# Patient Record
Sex: MALE | Race: WHITE | NOT HISPANIC OR LATINO | Employment: OTHER | URBAN - NONMETROPOLITAN AREA
[De-identification: names, ages, dates, MRNs, and addresses within clinical notes are randomized per-mention and may not be internally consistent; named-entity substitution may affect disease eponyms.]

---

## 2017-07-10 ENCOUNTER — APPOINTMENT (OUTPATIENT)
Dept: CT IMAGING | Facility: HOSPITAL | Age: 77
End: 2017-07-10

## 2017-07-10 ENCOUNTER — HOSPITAL ENCOUNTER (EMERGENCY)
Facility: HOSPITAL | Age: 77
Discharge: HOME | End: 2017-07-11
Attending: EMERGENCY MEDICINE | Admitting: EMERGENCY MEDICINE

## 2017-07-10 DIAGNOSIS — S20.20XA MULTIPLE CONTUSIONS OF TRUNK, INITIAL ENCOUNTER: ICD-10-CM

## 2017-07-10 DIAGNOSIS — T07.XXXA ABRASIONS OF MULTIPLE SITES: ICD-10-CM

## 2017-07-10 DIAGNOSIS — S30.1XXA ABDOMINAL WALL HEMATOMA, INITIAL ENCOUNTER: Primary | ICD-10-CM

## 2017-07-10 LAB
ALBUMIN SERPL-MCNC: 4.3 G/DL (ref 3.4–4.8)
ALBUMIN/GLOB SERPL: 1.3 G/DL (ref 1.5–2.5)
ALP SERPL-CCNC: 93 U/L (ref 40–129)
ALT SERPL W P-5'-P-CCNC: 23 U/L (ref 10–44)
ANION GAP SERPL CALCULATED.3IONS-SCNC: 1.9 MMOL/L (ref 3.6–11.2)
APTT PPP: 29.3 SECONDS (ref 23.8–36.1)
AST SERPL-CCNC: 21 U/L (ref 10–34)
BASOPHILS # BLD AUTO: 0.03 10*3/MM3 (ref 0–0.3)
BASOPHILS NFR BLD AUTO: 0.2 % (ref 0–2)
BILIRUB SERPL-MCNC: 0.3 MG/DL (ref 0.2–1.8)
BILIRUB UR QL STRIP: NEGATIVE
BUN BLD-MCNC: 13 MG/DL (ref 7–21)
BUN/CREAT SERPL: 11.9 (ref 7–25)
CALCIUM SPEC-SCNC: 9.4 MG/DL (ref 7.7–10)
CHLORIDE SERPL-SCNC: 108 MMOL/L (ref 99–112)
CLARITY UR: CLEAR
CO2 SERPL-SCNC: 31.1 MMOL/L (ref 24.3–31.9)
COLOR UR: YELLOW
CREAT BLD-MCNC: 1.09 MG/DL (ref 0.43–1.29)
DEPRECATED RDW RBC AUTO: 45.8 FL (ref 37–54)
EOSINOPHIL # BLD AUTO: 0.22 10*3/MM3 (ref 0–0.7)
EOSINOPHIL NFR BLD AUTO: 1.4 % (ref 0–7)
ERYTHROCYTE [DISTWIDTH] IN BLOOD BY AUTOMATED COUNT: 14.2 % (ref 11.5–14.5)
GFR SERPL CREATININE-BSD FRML MDRD: 65 ML/MIN/1.73
GLOBULIN UR ELPH-MCNC: 3.3 GM/DL
GLUCOSE BLD-MCNC: 152 MG/DL (ref 70–110)
GLUCOSE UR STRIP-MCNC: NEGATIVE MG/DL
HCT VFR BLD AUTO: 45.7 % (ref 42–52)
HGB BLD-MCNC: 15.7 G/DL (ref 14–18)
HGB UR QL STRIP.AUTO: NEGATIVE
IMM GRANULOCYTES # BLD: 0.04 10*3/MM3 (ref 0–0.03)
IMM GRANULOCYTES NFR BLD: 0.3 % (ref 0–0.5)
INR PPP: 0.98 (ref 0.9–1.1)
KETONES UR QL STRIP: NEGATIVE
LEUKOCYTE ESTERASE UR QL STRIP.AUTO: NEGATIVE
LYMPHOCYTES # BLD AUTO: 2.41 10*3/MM3 (ref 1–3)
LYMPHOCYTES NFR BLD AUTO: 15.6 % (ref 16–46)
MCH RBC QN AUTO: 31.2 PG (ref 27–33)
MCHC RBC AUTO-ENTMCNC: 34.4 G/DL (ref 33–37)
MCV RBC AUTO: 90.7 FL (ref 80–94)
MONOCYTES # BLD AUTO: 0.86 10*3/MM3 (ref 0.1–0.9)
MONOCYTES NFR BLD AUTO: 5.6 % (ref 0–12)
NEUTROPHILS # BLD AUTO: 11.92 10*3/MM3 (ref 1.4–6.5)
NEUTROPHILS NFR BLD AUTO: 76.9 % (ref 40–75)
NITRITE UR QL STRIP: NEGATIVE
OSMOLALITY SERPL CALC.SUM OF ELEC: 284.3 MOSM/KG (ref 273–305)
PH UR STRIP.AUTO: 7 [PH] (ref 5–8)
PLATELET # BLD AUTO: 239 10*3/MM3 (ref 130–400)
PMV BLD AUTO: 10.4 FL (ref 6–10)
POTASSIUM BLD-SCNC: 3.7 MMOL/L (ref 3.5–5.3)
PROT SERPL-MCNC: 7.6 G/DL (ref 6–8)
PROT UR QL STRIP: NEGATIVE
PROTHROMBIN TIME: 13.1 SECONDS (ref 11–15.4)
RBC # BLD AUTO: 5.04 10*6/MM3 (ref 4.7–6.1)
SODIUM BLD-SCNC: 141 MMOL/L (ref 135–153)
SP GR UR STRIP: 1.01 (ref 1–1.03)
UROBILINOGEN UR QL STRIP: NORMAL
WBC NRBC COR # BLD: 15.48 10*3/MM3 (ref 4.5–12.5)

## 2017-07-10 PROCEDURE — 99283 EMERGENCY DEPT VISIT LOW MDM: CPT

## 2017-07-10 PROCEDURE — 71275 CT ANGIOGRAPHY CHEST: CPT | Performed by: RADIOLOGY

## 2017-07-10 PROCEDURE — 72131 CT LUMBAR SPINE W/O DYE: CPT | Performed by: RADIOLOGY

## 2017-07-10 PROCEDURE — 85025 COMPLETE CBC W/AUTO DIFF WBC: CPT | Performed by: EMERGENCY MEDICINE

## 2017-07-10 PROCEDURE — 81003 URINALYSIS AUTO W/O SCOPE: CPT | Performed by: EMERGENCY MEDICINE

## 2017-07-10 PROCEDURE — 85730 THROMBOPLASTIN TIME PARTIAL: CPT | Performed by: EMERGENCY MEDICINE

## 2017-07-10 PROCEDURE — 85610 PROTHROMBIN TIME: CPT | Performed by: EMERGENCY MEDICINE

## 2017-07-10 PROCEDURE — 74177 CT ABD & PELVIS W/CONTRAST: CPT

## 2017-07-10 PROCEDURE — 72131 CT LUMBAR SPINE W/O DYE: CPT

## 2017-07-10 PROCEDURE — 71275 CT ANGIOGRAPHY CHEST: CPT

## 2017-07-10 PROCEDURE — 0 IOPAMIDOL PER 1 ML: Performed by: EMERGENCY MEDICINE

## 2017-07-10 PROCEDURE — 74177 CT ABD & PELVIS W/CONTRAST: CPT | Performed by: RADIOLOGY

## 2017-07-10 PROCEDURE — 80053 COMPREHEN METABOLIC PANEL: CPT | Performed by: EMERGENCY MEDICINE

## 2017-07-10 RX ORDER — ETODOLAC 600 MG/1
600 TABLET, EXTENDED RELEASE ORAL DAILY
Qty: 14 TABLET | Refills: 0 | Status: SHIPPED | OUTPATIENT
Start: 2017-07-10 | End: 2018-08-07

## 2017-07-10 RX ORDER — LEVOTHYROXINE SODIUM 0.12 MG/1
100 TABLET ORAL DAILY
COMMUNITY
End: 2019-12-11 | Stop reason: DRUGHIGH

## 2017-07-10 RX ORDER — ASPIRIN 81 MG/1
81 TABLET ORAL DAILY
Status: ON HOLD | COMMUNITY
End: 2021-08-25

## 2017-07-10 RX ORDER — RANITIDINE 150 MG/1
300 TABLET ORAL DAILY
COMMUNITY
End: 2018-08-07

## 2017-07-10 RX ORDER — BUDESONIDE AND FORMOTEROL FUMARATE DIHYDRATE 160; 4.5 UG/1; UG/1
2 AEROSOL RESPIRATORY (INHALATION)
COMMUNITY

## 2017-07-10 RX ORDER — SODIUM CHLORIDE 0.9 % (FLUSH) 0.9 %
10 SYRINGE (ML) INJECTION AS NEEDED
Status: DISCONTINUED | OUTPATIENT
Start: 2017-07-10 | End: 2017-07-11 | Stop reason: HOSPADM

## 2017-07-10 RX ORDER — VALSARTAN AND HYDROCHLOROTHIAZIDE 160; 12.5 MG/1; MG/1
1 TABLET, FILM COATED ORAL DAILY
COMMUNITY
End: 2018-08-07

## 2017-07-10 RX ORDER — TIZANIDINE HYDROCHLORIDE 4 MG/1
4 CAPSULE, GELATIN COATED ORAL 3 TIMES DAILY PRN
Qty: 15 CAPSULE | Refills: 0 | Status: SHIPPED | OUTPATIENT
Start: 2017-07-10 | End: 2018-08-07

## 2017-07-10 RX ORDER — SODIUM CHLORIDE 9 MG/ML
125 INJECTION, SOLUTION INTRAVENOUS CONTINUOUS
Status: DISCONTINUED | OUTPATIENT
Start: 2017-07-10 | End: 2017-07-11 | Stop reason: HOSPADM

## 2017-07-10 RX ORDER — FEXOFENADINE HCL 180 MG/1
180 TABLET ORAL DAILY PRN
Status: ON HOLD | COMMUNITY
End: 2018-08-07

## 2017-07-10 RX ORDER — AMLODIPINE BESYLATE 5 MG/1
10 TABLET ORAL DAILY
Status: ON HOLD | COMMUNITY
End: 2018-08-07

## 2017-07-10 RX ORDER — IBUPROFEN 200 MG
TABLET ORAL 3 TIMES DAILY
Qty: 28 G | Refills: 0 | Status: SHIPPED | OUTPATIENT
Start: 2017-07-10 | End: 2018-08-07

## 2017-07-10 RX ADMIN — IOPAMIDOL 100 ML: 755 INJECTION, SOLUTION INTRAVENOUS at 23:05

## 2017-07-10 RX ADMIN — SODIUM CHLORIDE 125 ML/HR: 9 INJECTION, SOLUTION INTRAVENOUS at 22:03

## 2017-07-11 VITALS
DIASTOLIC BLOOD PRESSURE: 95 MMHG | SYSTOLIC BLOOD PRESSURE: 170 MMHG | HEIGHT: 69 IN | TEMPERATURE: 98 F | WEIGHT: 225 LBS | RESPIRATION RATE: 18 BRPM | HEART RATE: 65 BPM | BODY MASS INDEX: 33.33 KG/M2 | OXYGEN SATURATION: 96 %
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
## 2017-12-04 ENCOUNTER — APPOINTMENT (OUTPATIENT)
Dept: GENERAL RADIOLOGY | Facility: HOSPITAL | Age: 77
End: 2017-12-04

## 2017-12-04 ENCOUNTER — OFFICE VISIT (OUTPATIENT)
Dept: CARDIOLOGY | Facility: HOSPITAL | Age: 77
End: 2017-12-04

## 2017-12-04 ENCOUNTER — HOSPITAL ENCOUNTER (OUTPATIENT)
Dept: CARDIOLOGY | Facility: HOSPITAL | Age: 77
Discharge: HOME | End: 2017-12-04
Admitting: NURSE PRACTITIONER

## 2017-12-04 ENCOUNTER — HOSPITAL ENCOUNTER (EMERGENCY)
Facility: HOSPITAL | Age: 77
Discharge: HOME | End: 2017-12-04
Attending: EMERGENCY MEDICINE | Admitting: EMERGENCY MEDICINE

## 2017-12-04 VITALS
HEIGHT: 69 IN | RESPIRATION RATE: 20 BRPM | HEART RATE: 73 BPM | OXYGEN SATURATION: 95 % | SYSTOLIC BLOOD PRESSURE: 146 MMHG | BODY MASS INDEX: 33.41 KG/M2 | WEIGHT: 225.6 LBS | TEMPERATURE: 98.8 F | DIASTOLIC BLOOD PRESSURE: 74 MMHG

## 2017-12-04 VITALS
RESPIRATION RATE: 16 BRPM | SYSTOLIC BLOOD PRESSURE: 125 MMHG | DIASTOLIC BLOOD PRESSURE: 72 MMHG | OXYGEN SATURATION: 96 % | TEMPERATURE: 98.7 F | BODY MASS INDEX: 33.92 KG/M2 | HEART RATE: 61 BPM | HEIGHT: 69 IN | WEIGHT: 229 LBS

## 2017-12-04 DIAGNOSIS — I10 HYPERTENSION, UNSPECIFIED TYPE: ICD-10-CM

## 2017-12-04 DIAGNOSIS — R00.2 PALPITATION: ICD-10-CM

## 2017-12-04 DIAGNOSIS — R00.2 PALPITATION: Primary | ICD-10-CM

## 2017-12-04 DIAGNOSIS — R00.2 INTERMITTENT PALPITATIONS: Primary | ICD-10-CM

## 2017-12-04 LAB
ALBUMIN SERPL-MCNC: 4.3 G/DL (ref 3.2–4.8)
ALBUMIN/GLOB SERPL: 1.4 G/DL (ref 1.5–2.5)
ALP SERPL-CCNC: 84 U/L (ref 25–100)
ALT SERPL W P-5'-P-CCNC: 26 U/L (ref 7–40)
ANION GAP SERPL CALCULATED.3IONS-SCNC: 7 MMOL/L (ref 3–11)
AST SERPL-CCNC: 20 U/L (ref 0–33)
BASOPHILS # BLD AUTO: 0.03 10*3/MM3 (ref 0–0.2)
BASOPHILS NFR BLD AUTO: 0.2 % (ref 0–1)
BILIRUB SERPL-MCNC: 0.4 MG/DL (ref 0.3–1.2)
BILIRUB UR QL STRIP: NEGATIVE
BUN BLD-MCNC: 16 MG/DL (ref 9–23)
BUN/CREAT SERPL: 13.3 (ref 7–25)
CALCIUM SPEC-SCNC: 9.2 MG/DL (ref 8.7–10.4)
CHLORIDE SERPL-SCNC: 108 MMOL/L (ref 99–109)
CLARITY UR: CLEAR
CO2 SERPL-SCNC: 25 MMOL/L (ref 20–31)
COLOR UR: YELLOW
CREAT BLD-MCNC: 1.2 MG/DL (ref 0.6–1.3)
DEPRECATED RDW RBC AUTO: 47.3 FL (ref 37–54)
EOSINOPHIL # BLD AUTO: 0.14 10*3/MM3 (ref 0–0.3)
EOSINOPHIL NFR BLD AUTO: 1.1 % (ref 0–3)
ERYTHROCYTE [DISTWIDTH] IN BLOOD BY AUTOMATED COUNT: 14.1 % (ref 11.3–14.5)
GFR SERPL CREATININE-BSD FRML MDRD: 58 ML/MIN/1.73
GLOBULIN UR ELPH-MCNC: 3 GM/DL
GLUCOSE BLD-MCNC: 105 MG/DL (ref 70–100)
GLUCOSE UR STRIP-MCNC: NEGATIVE MG/DL
HCT VFR BLD AUTO: 45.8 % (ref 38.9–50.9)
HGB BLD-MCNC: 15.6 G/DL (ref 13.1–17.5)
HGB UR QL STRIP.AUTO: NEGATIVE
HOLD SPECIMEN: NORMAL
HOLD SPECIMEN: NORMAL
IMM GRANULOCYTES # BLD: 0.04 10*3/MM3 (ref 0–0.03)
IMM GRANULOCYTES NFR BLD: 0.3 % (ref 0–0.6)
INR PPP: 0.99
KETONES UR QL STRIP: NEGATIVE
LEUKOCYTE ESTERASE UR QL STRIP.AUTO: NEGATIVE
LYMPHOCYTES # BLD AUTO: 2.44 10*3/MM3 (ref 0.6–4.8)
LYMPHOCYTES NFR BLD AUTO: 19.5 % (ref 24–44)
MAGNESIUM SERPL-MCNC: 2.4 MG/DL (ref 1.3–2.7)
MCH RBC QN AUTO: 31.3 PG (ref 27–31)
MCHC RBC AUTO-ENTMCNC: 34.1 G/DL (ref 32–36)
MCV RBC AUTO: 91.8 FL (ref 80–99)
MONOCYTES # BLD AUTO: 0.57 10*3/MM3 (ref 0–1)
MONOCYTES NFR BLD AUTO: 4.6 % (ref 0–12)
NEUTROPHILS # BLD AUTO: 9.29 10*3/MM3 (ref 1.5–8.3)
NEUTROPHILS NFR BLD AUTO: 74.3 % (ref 41–71)
NITRITE UR QL STRIP: NEGATIVE
PH UR STRIP.AUTO: 7 [PH] (ref 5–8)
PLATELET # BLD AUTO: 219 10*3/MM3 (ref 150–450)
PMV BLD AUTO: 10.3 FL (ref 6–12)
POTASSIUM BLD-SCNC: 3.8 MMOL/L (ref 3.5–5.5)
PROT SERPL-MCNC: 7.3 G/DL (ref 5.7–8.2)
PROT UR QL STRIP: ABNORMAL
PROTHROMBIN TIME: 10.8 SECONDS (ref 9.6–11.5)
RBC # BLD AUTO: 4.99 10*6/MM3 (ref 4.2–5.76)
SODIUM BLD-SCNC: 140 MMOL/L (ref 132–146)
SP GR UR STRIP: 1.01 (ref 1–1.03)
T4 SERPL-MCNC: 7.8 MCG/DL (ref 4.7–11.4)
TROPONIN I SERPL-MCNC: <0.006 NG/ML
TSH SERPL DL<=0.05 MIU/L-ACNC: 2.51 MIU/ML (ref 0.35–5.35)
UROBILINOGEN UR QL STRIP: ABNORMAL
WBC NRBC COR # BLD: 12.51 10*3/MM3 (ref 3.5–10.8)
WHOLE BLOOD HOLD SPECIMEN: NORMAL
WHOLE BLOOD HOLD SPECIMEN: NORMAL

## 2017-12-04 PROCEDURE — 0296T HC EXT ECG > 48HR TO 21 DAY RCRD W/CONECT INTL RCRD: CPT

## 2017-12-04 PROCEDURE — 84436 ASSAY OF TOTAL THYROXINE: CPT | Performed by: EMERGENCY MEDICINE

## 2017-12-04 PROCEDURE — 83735 ASSAY OF MAGNESIUM: CPT | Performed by: EMERGENCY MEDICINE

## 2017-12-04 PROCEDURE — 84443 ASSAY THYROID STIM HORMONE: CPT | Performed by: EMERGENCY MEDICINE

## 2017-12-04 PROCEDURE — 85025 COMPLETE CBC W/AUTO DIFF WBC: CPT | Performed by: EMERGENCY MEDICINE

## 2017-12-04 PROCEDURE — 99213 OFFICE O/P EST LOW 20 MIN: CPT | Performed by: NURSE PRACTITIONER

## 2017-12-04 PROCEDURE — 99283 EMERGENCY DEPT VISIT LOW MDM: CPT

## 2017-12-04 PROCEDURE — 80053 COMPREHEN METABOLIC PANEL: CPT | Performed by: EMERGENCY MEDICINE

## 2017-12-04 PROCEDURE — 81003 URINALYSIS AUTO W/O SCOPE: CPT | Performed by: EMERGENCY MEDICINE

## 2017-12-04 PROCEDURE — 85610 PROTHROMBIN TIME: CPT | Performed by: EMERGENCY MEDICINE

## 2017-12-04 PROCEDURE — 84484 ASSAY OF TROPONIN QUANT: CPT | Performed by: EMERGENCY MEDICINE

## 2017-12-04 PROCEDURE — 71020 HC CHEST PA AND LATERAL: CPT

## 2017-12-04 PROCEDURE — 93005 ELECTROCARDIOGRAM TRACING: CPT

## 2017-12-04 RX ORDER — CHLORAL HYDRATE 500 MG
1000 CAPSULE ORAL
Status: ON HOLD | COMMUNITY
End: 2022-11-24

## 2017-12-04 RX ORDER — SODIUM CHLORIDE 0.9 % (FLUSH) 0.9 %
10 SYRINGE (ML) INJECTION AS NEEDED
Status: DISCONTINUED | OUTPATIENT
Start: 2017-12-04 | End: 2017-12-04 | Stop reason: HOSPADM

## 2017-12-04 RX ORDER — ALBUTEROL SULFATE 2.5 MG/3ML
2.5 SOLUTION RESPIRATORY (INHALATION) EVERY 4 HOURS PRN
COMMUNITY
End: 2021-09-04 | Stop reason: HOSPADM

## 2017-12-04 RX ORDER — UBIDECARENONE 30 MG
550 CAPSULE ORAL
COMMUNITY
End: 2018-08-07

## 2017-12-04 NOTE — PROGRESS NOTES
"[facility]      Encounter Date:12/04/2017     Edward Rodas  [address] [address]  [phone]    1940    LORENZO Wade    Edward Rodas is a 77 y.o. male.      Subjective:     Chief Complaint:  Palpitations (12/4/17 [facility]: Palpitations)       HPI     Patient seen [facility] emergency department earlier today for palpitations.    Patient is a 77 y.o. male presenting with palpitations.   History provided by:  Patient  Palpitations   Palpitations quality:  Irregular  Onset quality:  Sudden  Duration:  10 minutes  Timing:  Intermittent  Progression:  Waxing and waning  Chronicity:  New  Context: bronchodilators, noted at rest   Relieved by:  None tried  Exacerbated by: ventolin inhaler.  Ineffective treatments:  None tried  Associated symptoms: cough (mild non-productive)    Associated symptoms: no back pain, no chest pain, no chest pressure, no dizziness, no malaise/fatigue, no nausea, no near-syncope, no shortness of breath and no vomiting    Associated symptoms comment:  Patient reports that he attended 2 funerals in the past 2 weeks and states that smelling pollen from the flowers have triggered an asthma flare.  Also reports that his seasonal allergies have been \"acting up\".  Has been using his bronchodilator more than he typically does and has noticed that he has been experiencing intermittent palpitations for approximately 2 weeks.  Denies any chest pain, shortness of breath.    Risk factors: hx of thyroid disease    Risk factors: no diabetes mellitus, no heart disease and no hx of atrial fibrillation      Hx of HTN.  Reports well controlled at home.  Keeps a home BP log.  Currently on norvasc, diovan/HCT.      Problem   Palpitations   Hypertension       Past Surgical History:   Procedure Laterality Date   • TONSILLECTOMY         Allergies   Allergen Reactions   • Shellfish-Derived Products Hives and Itching   • Statins Other (See Comments) "     Aches and confusion         Current Outpatient Prescriptions:   •  albuterol (PROVENTIL) (2.5 MG/3ML) 0.083% nebulizer solution, Take 2.5 mg by nebulization Every 4 (Four) Hours As Needed for Wheezing., Disp: , Rfl:   •  amLODIPine (NORVASC) 5 MG tablet, Take 10 mg by mouth Daily., Disp: , Rfl:   •  aspirin 81 MG EC tablet, Take 81 mg by mouth. Close to 3 times a week, Disp: , Rfl:   •  budesonide-formoterol (SYMBICORT) 160-4.5 MCG/ACT inhaler, Inhale 2 puffs 2 (Two) Times a Day., Disp: , Rfl:   •  etodolac XL (LODINE XL) 600 MG 24 hr tablet, Take 1 tablet by mouth Daily., Disp: 14 tablet, Rfl: 0  •  fexofenadine (ALLEGRA) 180 MG tablet, Take 180 mg by mouth Daily., Disp: , Rfl:   •  levothyroxine (SYNTHROID, LEVOTHROID) 125 MCG tablet, Take 125 mcg by mouth Daily., Disp: , Rfl:   •  neomycin-bacitracin-polymyxin (NEOSPORIN) 5-400-5000 ointment, Apply  topically 3 (Three) Times a Day., Disp: 28 g, Rfl: 0  •  Omega-3 Fatty Acids (FISH OIL) 1000 MG capsule capsule, Take 3,000 mg by mouth Daily With Breakfast., Disp: , Rfl:   •  Potassium Gluconate 550 MG tablet, Take 550 mg by mouth., Disp: , Rfl:   •  raNITIdine (ZANTAC) 150 MG tablet, Take 300 mg by mouth Daily., Disp: , Rfl:   •  TiZANidine (ZANAFLEX) 4 MG capsule, Take 1 capsule by mouth 3 (Three) Times a Day As Needed for Muscle Spasms., Disp: 15 capsule, Rfl: 0  •  valsartan-hydrochlorothiazide (DIOVAN-HCT) 160-12.5 MG per tablet, Take 1 tablet by mouth Daily., Disp: , Rfl:   No current facility-administered medications for this visit.     The following portions of the patient's history were reviewed and updated as appropriate: allergies, current medications, past family history, past medical history, past social history, past surgical history and problem list.    Review of Systems   Constitution: Positive for malaise/fatigue. Negative for chills, decreased appetite, diaphoresis, fever, weakness, night sweats, weight gain and weight loss.   HENT: Negative  "for congestion and nosebleeds.    Eyes: Negative for blurred vision, visual disturbance and visual halos.   Cardiovascular: Positive for irregular heartbeat and palpitations. Negative for chest pain, claudication, cyanosis, dyspnea on exertion, leg swelling, near-syncope, orthopnea, paroxysmal nocturnal dyspnea and syncope.   Respiratory: Positive for shortness of breath (asthma). Negative for cough, hemoptysis, sleep disturbances due to breathing, snoring, sputum production and wheezing.    Endocrine: Positive for polyuria. Negative for cold intolerance, heat intolerance, polydipsia and polyphagia.   Hematologic/Lymphatic: Does not bruise/bleed easily.   Skin: Negative for dry skin, itching and rash.   Musculoskeletal: Negative for falls, joint pain, joint swelling, muscle weakness and myalgias.   Gastrointestinal: Positive for constipation. Negative for bloating, abdominal pain, diarrhea, dysphagia, heartburn, melena, nausea and vomiting.   Genitourinary: Negative for dysuria, flank pain, hematuria and nocturia.   Neurological: Negative for difficulty with concentration, excessive daytime sleepiness, dizziness, headaches and loss of balance.   Psychiatric/Behavioral: Negative for altered mental status and depression. The patient is not nervous/anxious.    Allergic/Immunologic: Positive for environmental allergies.       Objective:     Vitals:    12/04/17 1421 12/04/17 1427 12/04/17 1428   BP: 153/82 145/76 146/74   BP Location: Right arm Left arm Left arm   Patient Position: Sitting Sitting Standing   Pulse: 71 71 73   Resp: 20     Temp: 98.8 °F (37.1 °C)     TempSrc: Temporal Artery      SpO2: 95%     Weight: 225 lb 9.6 oz (102 kg)     Height: 69\" (175.3 cm)           Physical Exam   Constitutional: He is oriented to person, place, and time. He appears well-developed and well-nourished. No distress.   HENT:   Head: Normocephalic and atraumatic.   Mouth/Throat: Oropharynx is clear and moist.   Eyes: Conjunctivae " are normal. Pupils are equal, round, and reactive to light. No scleral icterus.   Neck: No hepatojugular reflux and no JVD present. Carotid bruit is not present. No tracheal deviation present. No thyromegaly present.   Cardiovascular: Normal rate, regular rhythm, normal heart sounds and intact distal pulses.  Exam reveals no friction rub.    No murmur heard.  Pulmonary/Chest: Effort normal and breath sounds normal.   Abdominal: Soft. Bowel sounds are normal. He exhibits no distension. There is no tenderness.   Musculoskeletal: He exhibits no edema.   Lymphadenopathy:     He has no cervical adenopathy.   Neurological: He is alert and oriented to person, place, and time.   Skin: Skin is warm, dry and intact. No rash noted. No cyanosis or erythema. No pallor.   Psychiatric: He has a normal mood and affect. His behavior is normal. Thought content normal.   Vitals reviewed.      Lab and Diagnostic Review:  Admission on 12/04/2017, Discharged on 12/04/2017   Component Date Value Ref Range Status   • Glucose 12/04/2017 105* 70 - 100 mg/dL Final   • BUN 12/04/2017 16  9 - 23 mg/dL Final   • Creatinine 12/04/2017 1.20  0.60 - 1.30 mg/dL Final   • Sodium 12/04/2017 140  132 - 146 mmol/L Final   • Potassium 12/04/2017 3.8  3.5 - 5.5 mmol/L Final   • Chloride 12/04/2017 108  99 - 109 mmol/L Final   • CO2 12/04/2017 25.0  20.0 - 31.0 mmol/L Final   • Calcium 12/04/2017 9.2  8.7 - 10.4 mg/dL Final   • Total Protein 12/04/2017 7.3  5.7 - 8.2 g/dL Final   • Albumin 12/04/2017 4.30  3.20 - 4.80 g/dL Final   • ALT (SGPT) 12/04/2017 26  7 - 40 U/L Final   • AST (SGOT) 12/04/2017 20  0 - 33 U/L Final   • Alkaline Phosphatase 12/04/2017 84  25 - 100 U/L Final   • Total Bilirubin 12/04/2017 0.4  0.3 - 1.2 mg/dL Final   • eGFR Non African Amer 12/04/2017 58* >60 mL/min/1.73 Final   • Globulin 12/04/2017 3.0  gm/dL Final   • A/G Ratio 12/04/2017 1.4* 1.5 - 2.5 g/dL Final   • BUN/Creatinine Ratio 12/04/2017 13.3  7.0 - 25.0 Final   • Anion  Gap 12/04/2017 7.0  3.0 - 11.0 mmol/L Final   • Magnesium 12/04/2017 2.4  1.3 - 2.7 mg/dL Final   • Protime 12/04/2017 10.8  9.6 - 11.5 Seconds Final   • INR 12/04/2017 0.99   Final   • TSH 12/04/2017 2.509  0.350 - 5.350 mIU/mL Final   • T4, Total 12/04/2017 7.80  4.70 - 11.40 mcg/dL Final   • Extra Tube 12/04/2017 hold for add-on   Final    Auto resulted   • Extra Tube 12/04/2017 Hold for add-ons.   Final    Auto resulted.   • Extra Tube 12/04/2017 hold for add-on   Final    Auto resulted   • Extra Tube 12/04/2017 Hold for add-ons.   Final    Auto resulted.   • WBC 12/04/2017 12.51* 3.50 - 10.80 10*3/mm3 Final   • RBC 12/04/2017 4.99  4.20 - 5.76 10*6/mm3 Final   • Hemoglobin 12/04/2017 15.6  13.1 - 17.5 g/dL Final   • Hematocrit 12/04/2017 45.8  38.9 - 50.9 % Final   • MCV 12/04/2017 91.8  80.0 - 99.0 fL Final   • MCH 12/04/2017 31.3* 27.0 - 31.0 pg Final   • MCHC 12/04/2017 34.1  32.0 - 36.0 g/dL Final   • RDW 12/04/2017 14.1  11.3 - 14.5 % Final   • RDW-SD 12/04/2017 47.3  37.0 - 54.0 fl Final   • MPV 12/04/2017 10.3  6.0 - 12.0 fL Final   • Platelets 12/04/2017 219  150 - 450 10*3/mm3 Final   • Neutrophil % 12/04/2017 74.3* 41.0 - 71.0 % Final   • Lymphocyte % 12/04/2017 19.5* 24.0 - 44.0 % Final   • Monocyte % 12/04/2017 4.6  0.0 - 12.0 % Final   • Eosinophil % 12/04/2017 1.1  0.0 - 3.0 % Final   • Basophil % 12/04/2017 0.2  0.0 - 1.0 % Final   • Immature Grans % 12/04/2017 0.3  0.0 - 0.6 % Final   • Neutrophils, Absolute 12/04/2017 9.29* 1.50 - 8.30 10*3/mm3 Final   • Lymphocytes, Absolute 12/04/2017 2.44  0.60 - 4.80 10*3/mm3 Final   • Monocytes, Absolute 12/04/2017 0.57  0.00 - 1.00 10*3/mm3 Final   • Eosinophils, Absolute 12/04/2017 0.14  0.00 - 0.30 10*3/mm3 Final   • Basophils, Absolute 12/04/2017 0.03  0.00 - 0.20 10*3/mm3 Final   • Immature Grans, Absolute 12/04/2017 0.04* 0.00 - 0.03 10*3/mm3 Final   • Color, UA 12/04/2017 Yellow  Yellow, Straw Final   • Appearance, UA 12/04/2017 Clear  Clear Final    • pH, UA 12/04/2017 7.0  5.0 - 8.0 Final   • Specific Gravity, UA 12/04/2017 1.009  1.001 - 1.030 Final   • Glucose, UA 12/04/2017 Negative  Negative Final   • Ketones, UA 12/04/2017 Negative  Negative Final   • Bilirubin, UA 12/04/2017 Negative  Negative Final   • Blood, UA 12/04/2017 Negative  Negative Final   • Protein, UA 12/04/2017 Trace* Negative Final   • Leuk Esterase, UA 12/04/2017 Negative  Negative Final   • Nitrite, UA 12/04/2017 Negative  Negative Final   • Urobilinogen, UA 12/04/2017 0.2 E.U./dL  0.2 - 1.0 E.U./dL Final   • Troponin I 12/04/2017 <0.006  <=0.039 ng/mL Final       Assessment and Plan:         1. Palpitation    - Holter Monitor - 72 Hour Up To 21 Days; Zio 14 day monitor    2. Hypertension, unspecified type  Keep bP and HR log    F/u 6 weeks or sooner if needed.      *Please note that portions of this note were completed with a voice recognition program. Efforts were made to edit the dictations, but occasionally words are mistranscribed.

## 2018-01-02 ENCOUNTER — DOCUMENTATION (OUTPATIENT)
Dept: CARDIOLOGY | Facility: HOSPITAL | Age: 78
End: 2018-01-02

## 2018-01-02 ENCOUNTER — OFFICE VISIT (OUTPATIENT)
Dept: CARDIOLOGY | Facility: CLINIC | Age: 78
End: 2018-01-02

## 2018-01-02 DIAGNOSIS — R00.2 PALPITATIONS: ICD-10-CM

## 2018-01-02 PROCEDURE — 0298T PR EXT ECG > 48HR TO 21 DAY REVIEW AND INTERPRETATN: CPT | Performed by: INTERNAL MEDICINE

## 2018-01-02 NOTE — PROGRESS NOTES
Called pt to discuss Zio monitor results (12/04/17-12/18/17):  Average heart rate 66 bpm.  Primarily sinus rhythm.  Short bursts of nonsustained SVT (asymptomatic).  One run of VT lasting 22 beats (asymptomatic).  Rare PACs and PVCs. Pt activated s/s associated with SR.    Dr. Plunkett after reading cardiac heart monitor results recommended stress testing due to VT.  Discuss results and recommendations with patient to evaluate for underlying cardiovascular disease.  Patient declined having stress test at this time.  Patient has scheduled follow-up.  Encourage patient to keep follow-up as scheduled.  Please call if symptoms worsen.  Patient instructed to go to the emergency room with any chest pain.  Patient currently denies chest pain, pressure, palpitations have improved.  Patient does have dyspnea associated with asthma.  Discussed with patient, that dyspnea could also be r/t to underlying CAD as well.  Pt  states understanding, but does not want to schedule testing at this time.

## 2018-02-07 ENCOUNTER — HOSPITAL ENCOUNTER (EMERGENCY)
Facility: HOSPITAL | Age: 78
Discharge: LEFT BEFORE DISCHARGE | End: 2018-02-07

## 2018-02-07 VITALS
BODY MASS INDEX: 33.47 KG/M2 | TEMPERATURE: 98 F | SYSTOLIC BLOOD PRESSURE: 142 MMHG | RESPIRATION RATE: 18 BRPM | OXYGEN SATURATION: 96 % | HEART RATE: 95 BPM | DIASTOLIC BLOOD PRESSURE: 84 MMHG | HEIGHT: 69 IN | WEIGHT: 226 LBS

## 2018-02-07 PROCEDURE — 99211 OFF/OP EST MAY X REQ PHY/QHP: CPT

## 2018-08-07 ENCOUNTER — APPOINTMENT (OUTPATIENT)
Dept: GENERAL RADIOLOGY | Facility: HOSPITAL | Age: 78
End: 2018-08-07

## 2018-08-07 ENCOUNTER — HOSPITAL ENCOUNTER (OUTPATIENT)
Facility: HOSPITAL | Age: 78
Setting detail: OBSERVATION
Discharge: HOME | End: 2018-08-08
Attending: EMERGENCY MEDICINE | Admitting: EMERGENCY MEDICINE

## 2018-08-07 DIAGNOSIS — R55 NEAR SYNCOPE: Primary | ICD-10-CM

## 2018-08-07 DIAGNOSIS — Z86.79 HISTORY OF VENTRICULAR TACHYCARDIA: ICD-10-CM

## 2018-08-07 DIAGNOSIS — I47.29 NSVT (NONSUSTAINED VENTRICULAR TACHYCARDIA) (HCC): ICD-10-CM

## 2018-08-07 LAB
ALBUMIN SERPL-MCNC: 4.12 G/DL (ref 3.2–4.8)
ALBUMIN/GLOB SERPL: 1.3 G/DL (ref 1.5–2.5)
ALP SERPL-CCNC: 97 U/L (ref 25–100)
ALT SERPL W P-5'-P-CCNC: 23 U/L (ref 7–40)
ANION GAP SERPL CALCULATED.3IONS-SCNC: 5 MMOL/L (ref 3–11)
AST SERPL-CCNC: 16 U/L (ref 0–33)
BASOPHILS # BLD AUTO: 0.01 10*3/MM3 (ref 0–0.2)
BASOPHILS NFR BLD AUTO: 0.1 % (ref 0–1)
BILIRUB SERPL-MCNC: 0.5 MG/DL (ref 0.3–1.2)
BILIRUB UR QL STRIP: NEGATIVE
BUN BLD-MCNC: 17 MG/DL (ref 9–23)
BUN/CREAT SERPL: 14.4 (ref 7–25)
CALCIUM SPEC-SCNC: 9.5 MG/DL (ref 8.7–10.4)
CHLORIDE SERPL-SCNC: 106 MMOL/L (ref 99–109)
CLARITY UR: CLEAR
CO2 SERPL-SCNC: 28 MMOL/L (ref 20–31)
COLOR UR: YELLOW
CREAT BLD-MCNC: 1.18 MG/DL (ref 0.6–1.3)
DEPRECATED RDW RBC AUTO: 46.7 FL (ref 37–54)
EOSINOPHIL # BLD AUTO: 0.11 10*3/MM3 (ref 0–0.3)
EOSINOPHIL NFR BLD AUTO: 0.7 % (ref 0–3)
ERYTHROCYTE [DISTWIDTH] IN BLOOD BY AUTOMATED COUNT: 14.1 % (ref 11.3–14.5)
GFR SERPL CREATININE-BSD FRML MDRD: 59 ML/MIN/1.73
GLOBULIN UR ELPH-MCNC: 3.3 GM/DL
GLUCOSE BLD-MCNC: 157 MG/DL (ref 70–100)
GLUCOSE BLDC GLUCOMTR-MCNC: 98 MG/DL (ref 70–130)
GLUCOSE UR STRIP-MCNC: NEGATIVE MG/DL
HCT VFR BLD AUTO: 45.8 % (ref 38.9–50.9)
HGB BLD-MCNC: 15.4 G/DL (ref 13.1–17.5)
HGB UR QL STRIP.AUTO: NEGATIVE
HOLD SPECIMEN: NORMAL
HOLD SPECIMEN: NORMAL
IMM GRANULOCYTES # BLD: 0.05 10*3/MM3 (ref 0–0.03)
IMM GRANULOCYTES NFR BLD: 0.3 % (ref 0–0.6)
KETONES UR QL STRIP: NEGATIVE
LEUKOCYTE ESTERASE UR QL STRIP.AUTO: NEGATIVE
LYMPHOCYTES # BLD AUTO: 2.44 10*3/MM3 (ref 0.6–4.8)
LYMPHOCYTES NFR BLD AUTO: 15.7 % (ref 24–44)
MAGNESIUM SERPL-MCNC: 2.3 MG/DL (ref 1.3–2.7)
MCH RBC QN AUTO: 30.9 PG (ref 27–31)
MCHC RBC AUTO-ENTMCNC: 33.6 G/DL (ref 32–36)
MCV RBC AUTO: 91.8 FL (ref 80–99)
MONOCYTES # BLD AUTO: 0.8 10*3/MM3 (ref 0–1)
MONOCYTES NFR BLD AUTO: 5.2 % (ref 0–12)
NEUTROPHILS # BLD AUTO: 12.14 10*3/MM3 (ref 1.5–8.3)
NEUTROPHILS NFR BLD AUTO: 78.3 % (ref 41–71)
NITRITE UR QL STRIP: NEGATIVE
PH UR STRIP.AUTO: 7 [PH] (ref 5–8)
PLATELET # BLD AUTO: 217 10*3/MM3 (ref 150–450)
PMV BLD AUTO: 10.5 FL (ref 6–12)
POTASSIUM BLD-SCNC: 3.6 MMOL/L (ref 3.5–5.5)
PROT SERPL-MCNC: 7.4 G/DL (ref 5.7–8.2)
PROT UR QL STRIP: NEGATIVE
RBC # BLD AUTO: 4.99 10*6/MM3 (ref 4.2–5.76)
SODIUM BLD-SCNC: 139 MMOL/L (ref 132–146)
SP GR UR STRIP: 1.01 (ref 1–1.03)
TROPONIN I SERPL-MCNC: 0 NG/ML (ref 0–0.07)
UROBILINOGEN UR QL STRIP: NORMAL
WBC NRBC COR # BLD: 15.5 10*3/MM3 (ref 3.5–10.8)
WHOLE BLOOD HOLD SPECIMEN: NORMAL
WHOLE BLOOD HOLD SPECIMEN: NORMAL

## 2018-08-07 PROCEDURE — 93005 ELECTROCARDIOGRAM TRACING: CPT

## 2018-08-07 PROCEDURE — 94640 AIRWAY INHALATION TREATMENT: CPT

## 2018-08-07 PROCEDURE — 85025 COMPLETE CBC W/AUTO DIFF WBC: CPT

## 2018-08-07 PROCEDURE — 93005 ELECTROCARDIOGRAM TRACING: CPT | Performed by: EMERGENCY MEDICINE

## 2018-08-07 PROCEDURE — 99219 PR INITIAL OBSERVATION CARE/DAY 50 MINUTES: CPT | Performed by: INTERNAL MEDICINE

## 2018-08-07 PROCEDURE — 80053 COMPREHEN METABOLIC PANEL: CPT | Performed by: EMERGENCY MEDICINE

## 2018-08-07 PROCEDURE — 81003 URINALYSIS AUTO W/O SCOPE: CPT | Performed by: EMERGENCY MEDICINE

## 2018-08-07 PROCEDURE — 82962 GLUCOSE BLOOD TEST: CPT

## 2018-08-07 PROCEDURE — 83735 ASSAY OF MAGNESIUM: CPT | Performed by: EMERGENCY MEDICINE

## 2018-08-07 PROCEDURE — G0378 HOSPITAL OBSERVATION PER HR: HCPCS

## 2018-08-07 PROCEDURE — 84484 ASSAY OF TROPONIN QUANT: CPT

## 2018-08-07 PROCEDURE — 71045 X-RAY EXAM CHEST 1 VIEW: CPT

## 2018-08-07 PROCEDURE — 99285 EMERGENCY DEPT VISIT HI MDM: CPT

## 2018-08-07 RX ORDER — BUDESONIDE AND FORMOTEROL FUMARATE DIHYDRATE 160; 4.5 UG/1; UG/1
2 AEROSOL RESPIRATORY (INHALATION)
Status: DISCONTINUED | OUTPATIENT
Start: 2018-08-07 | End: 2018-08-08 | Stop reason: HOSPADM

## 2018-08-07 RX ORDER — AMLODIPINE BESYLATE 10 MG/1
10 TABLET ORAL DAILY
Status: DISCONTINUED | OUTPATIENT
Start: 2018-08-07 | End: 2018-08-08 | Stop reason: HOSPADM

## 2018-08-07 RX ORDER — LEVOTHYROXINE SODIUM 0.12 MG/1
125 TABLET ORAL DAILY
Status: DISCONTINUED | OUTPATIENT
Start: 2018-08-08 | End: 2018-08-08 | Stop reason: HOSPADM

## 2018-08-07 RX ORDER — LOSARTAN POTASSIUM AND HYDROCHLOROTHIAZIDE 25; 100 MG/1; MG/1
1 TABLET ORAL DAILY
COMMUNITY
End: 2018-11-01 | Stop reason: ALTCHOICE

## 2018-08-07 RX ORDER — AMLODIPINE BESYLATE 10 MG/1
10 TABLET ORAL DAILY
Status: DISCONTINUED | OUTPATIENT
Start: 2018-08-08 | End: 2018-08-07

## 2018-08-07 RX ORDER — SODIUM CHLORIDE 0.9 % (FLUSH) 0.9 %
1-10 SYRINGE (ML) INJECTION AS NEEDED
Status: DISCONTINUED | OUTPATIENT
Start: 2018-08-07 | End: 2018-08-08 | Stop reason: HOSPADM

## 2018-08-07 RX ORDER — AMLODIPINE BESYLATE 10 MG/1
10 TABLET ORAL DAILY
COMMUNITY
End: 2019-06-18 | Stop reason: SINTOL

## 2018-08-07 RX ORDER — SODIUM CHLORIDE 0.9 % (FLUSH) 0.9 %
10 SYRINGE (ML) INJECTION AS NEEDED
Status: DISCONTINUED | OUTPATIENT
Start: 2018-08-07 | End: 2018-08-08 | Stop reason: HOSPADM

## 2018-08-07 RX ORDER — ASPIRIN 81 MG/1
81 TABLET ORAL DAILY
Status: DISCONTINUED | OUTPATIENT
Start: 2018-08-08 | End: 2018-08-08 | Stop reason: HOSPADM

## 2018-08-07 RX ADMIN — BUDESONIDE AND FORMOTEROL FUMARATE DIHYDRATE 2 PUFF: 160; 4.5 AEROSOL RESPIRATORY (INHALATION) at 20:18

## 2018-08-07 RX ADMIN — AMLODIPINE BESYLATE 10 MG: 10 TABLET ORAL at 20:48

## 2018-08-07 NOTE — CONSULTS
[facility]  CARDIOLOGY CONSULTATION NOTE    Edward Rodas  : 1940  MRN:[id number]    Date of Consultation: 18    PCP: Ángela Bonilla, LORENZO    IDENTIFICATION: An 78 y.o. male resident of [place], [place]    Chief Complaint   Patient presents with   • Near-syncope     PROBLEM LIST:   1. Near-syncope  2. Palpitations  A. ZIO monitor 2017: SR as predominant rhythm, 1 run of NSVT (12 beats), 15 runs of SVT with longest lasting 10 beats  B. Stress test recommended for NSVT, patient refused  3. Hypothyroidism  4. Hypertension    ALLERGIES:   Allergies   Allergen Reactions   • Diprivan [Propofol] Angioedema   • Shellfish-Derived Products Hives and Itching   • Statins Other (See Comments)     Aches and confusion     HPI: Mr. Rodas is a pleasant 77 y/o WM with history of HTN and hypothyroidism and no prior cardiac history who is seen in consultation for 2 episodes of near-syncope. He reports the first episode occurred yesterday afternoon while he was standing in the kitchen and he had near-syncope. He attributes this episode to low blood sugar as he reports he ate some chocolate syrup and drank some milk and felt better. He reports feeling bad all day yesterday. He had another episode today while sitting at the kitchen table. This one occurred approximately 30 minutes after eating breakfast and lasted about 3 minutes. He denies any associated symptoms such as chest discomfort, palpitations, dyspnea. He denies gabriel syncope. He was evaluated last December in our [facility] for palpitations. At that time he wore a ZIO monitor which demonstrated some SVT as well as NSVT and stress test was recommended, however patient refused. He has not had recurrent tachypalpitations since that time. He denies angina, dyspnea, or heart failure symptoms. No history of MI, CVA, DVT/PE. No alcohol, tobacco or drug use. He also reports a productive cough that began yesterday, but  "denies fever, chills. He drinks 6 cups of coffee daily, however states he drinks \"plenty of water\" as well.       ROS: All systems have been reviewed and are negative with the exception of those mentioned in the HPI and problem list above.    Surgical History:   Past Surgical History:   Procedure Laterality Date   • TONSILLECTOMY       Social History:   Social History     Social History   • Marital status:      Spouse name: N/A   • Number of children: N/A   • Years of education: N/A     Occupational History   • Not on file.     Social History Main Topics   • Smoking status: Never Smoker   • Smokeless tobacco: Never Used   • Alcohol use No   • Drug use: No   • Sexual activity: Defer     Other Topics Concern   • Not on file     Social History Narrative    Caffeine use: 3-4 cups coffee daily.    Patient lives at home with his wife.          Family History: History reviewed. No pertinent family history.    Objective     /88   Pulse 67   Temp 98.4 °F (36.9 °C) (Oral)   Resp 16   Ht 175.3 cm (69\")   Wt 103 kg (227 lb)   SpO2 97%   BMI 33.52 kg/m²   No intake or output data in the 24 hours ending 08/07/18 1749    PHYSICAL EXAM:  CONSTITUTIONAL: Well nourished, cooperative, in no acute distress  HEENT: Normocephalic, atraumatic, PERRLA, no JVD, no carotid bruit  CARDIOVASCULAR:  Regular rhythm and normal rate, no murmur, gallop, rub. Peripheral pulses are present and equal bilaterally  RESPIRATORY: Clear to auscultation, normal respiratory effort, no wheezing, rales or ronchi  GI: Soft, nontender, normal bowel sounds  MUSCULOSKELETAL: No gross deformities, no edema  SKIN: Warm, dry. No bleeding, bruising or rash  NEUROLOGICAL: No focal deficits  PSYCHIATRIC: Normal mood and affect. Behavior is normal     Labs/Diagnostic Data    Results from last 7 days  Lab Units 08/07/18  1414   SODIUM mmol/L 139   POTASSIUM mmol/L 3.6   CHLORIDE mmol/L 106   CO2 mmol/L 28.0   BUN mg/dL 17   CREATININE mg/dL 1.18 "   GLUCOSE mg/dL 157*   CALCIUM mg/dL 9.5       Results from last 7 days  Lab Units 08/07/18  1414   WBC 10*3/mm3 15.50*   HEMOGLOBIN g/dL 15.4   HEMATOCRIT % 45.8   PLATELETS 10*3/mm3 217       Results from last 7 days  Lab Units 08/07/18  1414   MAGNESIUM mg/dL 2.3     Troponin: 0.00    I personally reviewed the patient's EKG/Telemetry data    Radiology Data:   CXR 8/7/2018:  FINDINGS: The cardiac silhouette is normal. There are chronic pulmonary  changes. There is no acute pulmonary process, mass or effusion.         IMPRESSION:  Chronic change; no acute disease.    Assessment and Plan:     1. Near-syncope  - will admit for observation given history of NSVT   - obtain orthostatic BP's  - obtain echocardiogram and nuclear stress test.    2. Hypothyroidism  - check TSH in AM    3. Productive cough and leukocytosis  - CXR interpreted as chronic changes  - obtain PA and lateral  CXR in AM    I, Leonardo Burrows MD, personally performed the services described as documented by the above named individual. I have made any necessary edits and it is both accurate and complete 8/7/2018  7:19 PM          Scribed for Leonardo Burrows MD by Annemarie Miguel PA-C. 8/7/2018  5:49 PM      Thank you for allowing me to participate in the care of Edward Rodas. Feel free to contact me directly with any further questions or concerns.

## 2018-08-07 NOTE — ED PROVIDER NOTES
Subjective   Edward Rodas is a 78 y.o.male who presents to the ED with complaints of a near syncopal episodes.The patient says that yesterday at supper he went to fix a drink and he had a near syncopal episode where he about loss consciousness. He denies getting sweaty at this time. He attributed this to low blood sugar and he drank some milk and put some chocolate syrup on his tongue and he got better in a few minutes. This morning he got up and fixed breakfast and ate friend, eggs, toast, coffee, and jelly. Thirty minutes later he felt like he was going to pass out, he says he also nearly loss consciousness again and he had to sit down to recover. His episode felt similar to his episodes he attributes to low blood sugar, but he says that he is more concerned because of the frequency these episodes have happened. He has had a cough that produces yellow sputum that began yesterday. He denies having any fevers, chest pain, shortness of breath, or diaphoresis. He also denies having any difficulty urinating and he says he drinks plenty of fluids. The patient says he knows he has low blood sugar, but this has not been confirmed in any way other than doctors telling him this. The patient did have his fluid pill increased about a month and a half ago and he just changed his blood pressure medication to Valsartan and Losartan. He has no history of surgeries. He did have a stress test sometime between 2000 and 2002. He wore a holter monitor for two weeks in December of 2017. They found that he had episodes of Ventricular Tachycardia and he was supposed to follow up, but he did not. There are no other acute complaints at this time.               History provided by:  Patient  Syncope   Episode history:  Multiple  Most recent episode:  Today  Timing:  Constant  Progression:  Resolved  Chronicity:  Recurrent  Witnessed: yes    Relieved by:  Eating  Worsened by:  Nothing  Associated symptoms: no chest pain, no diaphoresis, no  fever and no shortness of breath        Review of Systems   Constitutional: Negative for diaphoresis and fever.   Respiratory: Positive for cough (producing yellow sputum). Negative for shortness of breath.    Cardiovascular: Positive for syncope. Negative for chest pain.   Neurological:        Near-syncope     All other systems reviewed and are negative.      Past Medical History:   Diagnosis Date   • Disease of thyroid gland    • Hypertension        Allergies   Allergen Reactions   • Diprivan [Propofol] Angioedema   • Shellfish-Derived Products Hives and Itching   • Statins Other (See Comments)     Aches and confusion       Past Surgical History:   Procedure Laterality Date   • TONSILLECTOMY         History reviewed. No pertinent family history.    Social History     Social History   • Marital status:      Social History Main Topics   • Smoking status: Never Smoker   • Smokeless tobacco: Never Used   • Alcohol use No   • Drug use: No   • Sexual activity: Defer     Other Topics Concern   • Not on file     Social History Narrative    Caffeine use: 3-4 cups coffee daily.    Patient lives at home with his wife.              Objective   Physical Exam   Constitutional: He is oriented to person, place, and time. He appears well-developed and well-nourished. No distress.   HENT:   Head: Normocephalic and atraumatic.   Nose: Nose normal.   Eyes: Conjunctivae are normal. No scleral icterus.   Neck: Normal range of motion. Neck supple.   Cardiovascular: Normal rate, regular rhythm and normal heart sounds.    No murmur heard.  Pulmonary/Chest: Effort normal and breath sounds normal. No respiratory distress.   Abdominal: Soft. Bowel sounds are normal. There is no tenderness.   Musculoskeletal: Normal range of motion. He exhibits no edema.   Neurological: He is alert and oriented to person, place, and time.   Skin: Skin is warm and dry.   Psychiatric: He has a normal mood and affect. His behavior is normal.   Nursing  note and vitals reviewed.      Procedures         ED Course  ED Course as of Aug 07 2303   Tue Aug 07, 2018   1452 WBC: (!) 15.50 [TJ]   1813 Discussed the plan care of the patient and his son.  To document that the Holter monitor and December did show a run of V. tach and was asymptomatic is post to follow-up but did not.  Discussed patient has a son feel more comfortable being seen by cardiology here and they will determine disposition.  [MARIO]   1813 Cardiology came to emergency department as can admit the patient overnight for observation.  [MARIO]      ED Course User Index  [MARIO] Weston Colby PA  [TJ] Herman Ozuna     Recent Results (from the past 24 hour(s))   Comprehensive Metabolic Panel    Collection Time: 08/07/18  2:14 PM   Result Value Ref Range    Glucose 157 (H) 70 - 100 mg/dL    BUN 17 9 - 23 mg/dL    Creatinine 1.18 0.60 - 1.30 mg/dL    Sodium 139 132 - 146 mmol/L    Potassium 3.6 3.5 - 5.5 mmol/L    Chloride 106 99 - 109 mmol/L    CO2 28.0 20.0 - 31.0 mmol/L    Calcium 9.5 8.7 - 10.4 mg/dL    Total Protein 7.4 5.7 - 8.2 g/dL    Albumin 4.12 3.20 - 4.80 g/dL    ALT (SGPT) 23 7 - 40 U/L    AST (SGOT) 16 0 - 33 U/L    Alkaline Phosphatase 97 25 - 100 U/L    Total Bilirubin 0.5 0.3 - 1.2 mg/dL    eGFR Non African Amer 59 (L) >60 mL/min/1.73    Globulin 3.3 gm/dL    A/G Ratio 1.3 (L) 1.5 - 2.5 g/dL    BUN/Creatinine Ratio 14.4 7.0 - 25.0    Anion Gap 5.0 3.0 - 11.0 mmol/L   Magnesium    Collection Time: 08/07/18  2:14 PM   Result Value Ref Range    Magnesium 2.3 1.3 - 2.7 mg/dL   Light Blue Top    Collection Time: 08/07/18  2:14 PM   Result Value Ref Range    Extra Tube hold for add-on    Green Top (Gel)    Collection Time: 08/07/18  2:14 PM   Result Value Ref Range    Extra Tube Hold for add-ons.    Lavender Top    Collection Time: 08/07/18  2:14 PM   Result Value Ref Range    Extra Tube hold for add-on    Gold Top - SST    Collection Time: 08/07/18  2:14 PM   Result Value Ref Range    Extra Tube  Hold for add-ons.    CBC Auto Differential    Collection Time: 08/07/18  2:14 PM   Result Value Ref Range    WBC 15.50 (H) 3.50 - 10.80 10*3/mm3    RBC 4.99 4.20 - 5.76 10*6/mm3    Hemoglobin 15.4 13.1 - 17.5 g/dL    Hematocrit 45.8 38.9 - 50.9 %    MCV 91.8 80.0 - 99.0 fL    MCH 30.9 27.0 - 31.0 pg    MCHC 33.6 32.0 - 36.0 g/dL    RDW 14.1 11.3 - 14.5 %    RDW-SD 46.7 37.0 - 54.0 fl    MPV 10.5 6.0 - 12.0 fL    Platelets 217 150 - 450 10*3/mm3    Neutrophil % 78.3 (H) 41.0 - 71.0 %    Lymphocyte % 15.7 (L) 24.0 - 44.0 %    Monocyte % 5.2 0.0 - 12.0 %    Eosinophil % 0.7 0.0 - 3.0 %    Basophil % 0.1 0.0 - 1.0 %    Immature Grans % 0.3 0.0 - 0.6 %    Neutrophils, Absolute 12.14 (H) 1.50 - 8.30 10*3/mm3    Lymphocytes, Absolute 2.44 0.60 - 4.80 10*3/mm3    Monocytes, Absolute 0.80 0.00 - 1.00 10*3/mm3    Eosinophils, Absolute 0.11 0.00 - 0.30 10*3/mm3    Basophils, Absolute 0.01 0.00 - 0.20 10*3/mm3    Immature Grans, Absolute 0.05 (H) 0.00 - 0.03 10*3/mm3   POC Troponin, Rapid    Collection Time: 08/07/18  2:19 PM   Result Value Ref Range    Troponin I 0.00 0.00 - 0.07 ng/mL   Urinalysis With Microscopic If Indicated (No Culture) - Urine, Clean Catch    Collection Time: 08/07/18  3:09 PM   Result Value Ref Range    Color, UA Yellow Yellow, Straw    Appearance, UA Clear Clear    pH, UA 7.0 5.0 - 8.0    Specific Gravity, UA 1.010 1.001 - 1.030    Glucose, UA Negative Negative    Ketones, UA Negative Negative    Bilirubin, UA Negative Negative    Blood, UA Negative Negative    Protein, UA Negative Negative    Leuk Esterase, UA Negative Negative    Nitrite, UA Negative Negative    Urobilinogen, UA 0.2 E.U./dL 0.2 - 1.0 E.U./dL   POC Glucose Once    Collection Time: 08/07/18  4:36 PM   Result Value Ref Range    Glucose 98 70 - 130 mg/dL     Note: In addition to lab results from this visit, the labs listed above may include labs taken at another facility or during a different encounter within the last 24 hours. Please  correlate lab times with ED admission and discharge times for further clarification of the services performed during this visit.    XR Chest 1 View   Final Result   Chronic change; no acute disease.       D:  08/07/2018   E:  08/07/2018       This report was finalized on 8/7/2018 3:43 PM by Dr. Petey Restrepo MD.          XR Chest PA & Lateral    (Results Pending)     Vitals:    08/07/18 1844 08/07/18 2018 08/07/18 2048 08/07/18 2106   BP: 156/87  140/87    BP Location: Right arm  Right arm    Patient Position: Sitting  Lying    Pulse: 68 65 64    Resp:  16     Temp:       TempSrc:       SpO2: 96% 97%     Weight:    102 kg (225 lb 6.4 oz)   Height:         Medications   sodium chloride 0.9 % flush 10 mL (not administered)   aspirin EC tablet 81 mg (not administered)   levothyroxine (SYNTHROID, LEVOTHROID) tablet 125 mcg (not administered)   losartan (COZAAR) 100 mg, hydrochlorothiazide (HYDRODIURIL) 25 mg for HYZAAR 100-25 (not administered)   sodium chloride 0.9 % flush 1-10 mL (not administered)   budesonide-formoterol (SYMBICORT) 160-4.5 MCG/ACT inhaler 2 puff ( Inhalation Canceled Entry 8/7/18 2130)   amLODIPine (NORVASC) tablet 10 mg (10 mg Oral Given 8/7/18 2048)     ECG/EMG Results (last 24 hours)     Procedure Component Value Units Date/Time    ECG 12 Lead [[id number]] Collected:  08/07/18 1407     Updated:  08/07/18 1518    Narrative:       Test Reason : Weak/Dizzy/AMS protocol  Blood Pressure : **/** mmHG  Vent. Rate : 080 BPM     Atrial Rate : 080 BPM     P-R Int : 134 ms          QRS Dur : 092 ms      QT Int : 366 ms       P-R-T Axes : 069 011 079 degrees     QTc Int : 422 ms    Normal sinus rhythm  Normal ECG  When compared with ECG of 04-DEC-2017 08:41,  No significant change was found  Confirmed by ELYSSA MOYA MD ([id number]) on 8/7/2018 3:18:26 PM    Referred By:  EDMD           Confirmed By:ELYSSA MOYA MD                          [facility]  Number of Diagnoses or Management Options  History of ventricular  tachycardia: new and requires workup  Near syncope: new and requires workup     Amount and/or Complexity of Data Reviewed  Clinical lab tests: reviewed and ordered  Tests in the radiology section of CPT®: reviewed and ordered  Tests in the medicine section of CPT®: ordered and reviewed  Decide to obtain previous medical records or to obtain history from someone other than the patient: yes  Discuss the patient with other providers: yes    Patient Progress  Patient progress: stable      Final diagnoses:   Near syncope   History of ventricular tachycardia       Documentation assistance provided by carlo Ozuna.  Information recorded by the carlo was done at my direction and has been verified and validated by me.     Herman Ozuna  08/07/18 1556       Herman Ozuna  08/07/18 1557       Weston Colby PA  08/07/18 3054

## 2018-08-08 ENCOUNTER — APPOINTMENT (OUTPATIENT)
Dept: GENERAL RADIOLOGY | Facility: HOSPITAL | Age: 78
End: 2018-08-08

## 2018-08-08 ENCOUNTER — APPOINTMENT (OUTPATIENT)
Dept: CARDIOLOGY | Facility: HOSPITAL | Age: 78
End: 2018-08-08
Attending: INTERNAL MEDICINE

## 2018-08-08 VITALS
HEIGHT: 69 IN | WEIGHT: 225.4 LBS | HEART RATE: 70 BPM | OXYGEN SATURATION: 96 % | BODY MASS INDEX: 33.38 KG/M2 | DIASTOLIC BLOOD PRESSURE: 85 MMHG | RESPIRATION RATE: 16 BRPM | SYSTOLIC BLOOD PRESSURE: 127 MMHG | TEMPERATURE: 98.5 F

## 2018-08-08 DIAGNOSIS — I47.1 SUPRAVENTRICULAR TACHYCARDIA (HCC): Primary | ICD-10-CM

## 2018-08-08 DIAGNOSIS — R94.31 ABNORMAL ELECTROCARDIOGRAM: ICD-10-CM

## 2018-08-08 LAB
ANION GAP SERPL CALCULATED.3IONS-SCNC: 8 MMOL/L (ref 3–11)
ARTICHOKE IGE QN: 156 MG/DL (ref 0–130)
BH CV ECHO MEAS - AO ROOT AREA (BSA CORRECTED): 1.4
BH CV ECHO MEAS - AO ROOT AREA: 7.5 CM^2
BH CV ECHO MEAS - AO ROOT DIAM: 3.1 CM
BH CV ECHO MEAS - BSA(HAYCOCK): 2.3 M^2
BH CV ECHO MEAS - BSA: 2.2 M^2
BH CV ECHO MEAS - BZI_BMI: 33.2 KILOGRAMS/M^2
BH CV ECHO MEAS - BZI_METRIC_HEIGHT: 175.3 CM
BH CV ECHO MEAS - BZI_METRIC_WEIGHT: 102.1 KG
BH CV ECHO MEAS - EDV(CUBED): 105.4 ML
BH CV ECHO MEAS - EDV(TEICH): 103.6 ML
BH CV ECHO MEAS - EF(CUBED): 66.1 %
BH CV ECHO MEAS - EF(TEICH): 57.6 %
BH CV ECHO MEAS - ESV(CUBED): 35.7 ML
BH CV ECHO MEAS - ESV(TEICH): 43.9 ML
BH CV ECHO MEAS - FS: 30.3 %
BH CV ECHO MEAS - IVS/LVPW: 0.97
BH CV ECHO MEAS - IVSD: 1.2 CM
BH CV ECHO MEAS - LA DIMENSION: 3.2 CM
BH CV ECHO MEAS - LA/AO: 1
BH CV ECHO MEAS - LV MASS(C)D: 219 GRAMS
BH CV ECHO MEAS - LV MASS(C)DI: 100.9 GRAMS/M^2
BH CV ECHO MEAS - LVIDD: 4.7 CM
BH CV ECHO MEAS - LVIDS: 3.3 CM
BH CV ECHO MEAS - LVPWD: 1.2 CM
BH CV ECHO MEAS - MV A MAX VEL: 116.5 CM/SEC
BH CV ECHO MEAS - MV DEC SLOPE: 373.8 CM/SEC^2
BH CV ECHO MEAS - MV DEC TIME: 0.24 SEC
BH CV ECHO MEAS - MV E MAX VEL: 92.8 CM/SEC
BH CV ECHO MEAS - MV E/A: 0.8
BH CV ECHO MEAS - MV P1/2T MAX VEL: 99.5 CM/SEC
BH CV ECHO MEAS - MV P1/2T: 78 MSEC
BH CV ECHO MEAS - MVA P1/2T LCG: 2.2 CM^2
BH CV ECHO MEAS - MVA(P1/2T): 2.8 CM^2
BH CV ECHO MEAS - RAP SYSTOLE: 3 MMHG
BH CV ECHO MEAS - RVSP: 19 MMHG
BH CV ECHO MEAS - SI(CUBED): 32.1 ML/M^2
BH CV ECHO MEAS - SI(TEICH): 27.5 ML/M^2
BH CV ECHO MEAS - SV(CUBED): 69.7 ML
BH CV ECHO MEAS - SV(TEICH): 59.6 ML
BH CV ECHO MEAS - TAPSE (>1.6): 2.9 CM2
BH CV ECHO MEAS - TR MAX VEL: 201.5 CM/SEC
BH CV XLRA - RV BASE: 2.6 CM
BH CV XLRA - RV LENGTH: 6.2 CM
BH CV XLRA - RV MID: 2.1 CM
BH CV XLRA - TDI S': 8.47 CM/SEC
BUN BLD-MCNC: 16 MG/DL (ref 9–23)
BUN/CREAT SERPL: 13.8 (ref 7–25)
CALCIUM SPEC-SCNC: 9 MG/DL (ref 8.7–10.4)
CHLORIDE SERPL-SCNC: 105 MMOL/L (ref 99–109)
CHOLEST SERPL-MCNC: 186 MG/DL (ref 0–200)
CO2 SERPL-SCNC: 27 MMOL/L (ref 20–31)
CREAT BLD-MCNC: 1.16 MG/DL (ref 0.6–1.3)
DEPRECATED RDW RBC AUTO: 46.9 FL (ref 37–54)
ERYTHROCYTE [DISTWIDTH] IN BLOOD BY AUTOMATED COUNT: 14.1 % (ref 11.3–14.5)
GFR SERPL CREATININE-BSD FRML MDRD: 61 ML/MIN/1.73
GLUCOSE BLD-MCNC: 104 MG/DL (ref 70–100)
GLUCOSE BLDC GLUCOMTR-MCNC: 102 MG/DL (ref 70–130)
GLUCOSE BLDC GLUCOMTR-MCNC: 112 MG/DL (ref 70–130)
GLUCOSE BLDC GLUCOMTR-MCNC: 152 MG/DL (ref 70–130)
GLUCOSE BLDC GLUCOMTR-MCNC: 171 MG/DL (ref 70–130)
HBA1C MFR BLD: 5.9 % (ref 4.8–5.6)
HCT VFR BLD AUTO: 43.6 % (ref 38.9–50.9)
HDLC SERPL-MCNC: 34 MG/DL (ref 40–60)
HGB BLD-MCNC: 14.5 G/DL (ref 13.1–17.5)
LEFT ATRIUM VOLUME INDEX: 26.3 ML/M2
LEFT ATRIUM VOLUME: 57 CM3
MAGNESIUM SERPL-MCNC: 2.2 MG/DL (ref 1.3–2.7)
MCH RBC QN AUTO: 30.5 PG (ref 27–31)
MCHC RBC AUTO-ENTMCNC: 33.3 G/DL (ref 32–36)
MCV RBC AUTO: 91.8 FL (ref 80–99)
PLATELET # BLD AUTO: 211 10*3/MM3 (ref 150–450)
PMV BLD AUTO: 10.6 FL (ref 6–12)
POTASSIUM BLD-SCNC: 3.5 MMOL/L (ref 3.5–5.5)
RBC # BLD AUTO: 4.75 10*6/MM3 (ref 4.2–5.76)
SODIUM BLD-SCNC: 140 MMOL/L (ref 132–146)
TRIGL SERPL-MCNC: 112 MG/DL (ref 0–150)
TSH SERPL DL<=0.05 MIU/L-ACNC: 2.76 MIU/ML (ref 0.35–5.35)
WBC NRBC COR # BLD: 15.74 10*3/MM3 (ref 3.5–10.8)

## 2018-08-08 PROCEDURE — 82962 GLUCOSE BLOOD TEST: CPT

## 2018-08-08 PROCEDURE — 84443 ASSAY THYROID STIM HORMONE: CPT | Performed by: PHYSICIAN ASSISTANT

## 2018-08-08 PROCEDURE — 71046 X-RAY EXAM CHEST 2 VIEWS: CPT

## 2018-08-08 PROCEDURE — 83036 HEMOGLOBIN GLYCOSYLATED A1C: CPT | Performed by: PHYSICIAN ASSISTANT

## 2018-08-08 PROCEDURE — 94640 AIRWAY INHALATION TREATMENT: CPT

## 2018-08-08 PROCEDURE — 80061 LIPID PANEL: CPT | Performed by: PHYSICIAN ASSISTANT

## 2018-08-08 PROCEDURE — G0378 HOSPITAL OBSERVATION PER HR: HCPCS

## 2018-08-08 PROCEDURE — 85027 COMPLETE CBC AUTOMATED: CPT | Performed by: PHYSICIAN ASSISTANT

## 2018-08-08 PROCEDURE — 94799 UNLISTED PULMONARY SVC/PX: CPT

## 2018-08-08 PROCEDURE — 93306 TTE W/DOPPLER COMPLETE: CPT | Performed by: INTERNAL MEDICINE

## 2018-08-08 PROCEDURE — 83735 ASSAY OF MAGNESIUM: CPT | Performed by: PHYSICIAN ASSISTANT

## 2018-08-08 PROCEDURE — 93306 TTE W/DOPPLER COMPLETE: CPT

## 2018-08-08 PROCEDURE — 80048 BASIC METABOLIC PNL TOTAL CA: CPT | Performed by: PHYSICIAN ASSISTANT

## 2018-08-08 RX ADMIN — BUDESONIDE AND FORMOTEROL FUMARATE DIHYDRATE 2 PUFF: 160; 4.5 AEROSOL RESPIRATORY (INHALATION) at 11:07

## 2018-08-08 RX ADMIN — HYDROCHLOROTHIAZIDE: 25 TABLET ORAL at 10:49

## 2018-08-08 RX ADMIN — ASPIRIN 81 MG: 81 TABLET, COATED ORAL at 10:49

## 2018-08-08 RX ADMIN — LEVOTHYROXINE SODIUM 125 MCG: 125 TABLET ORAL at 10:50

## 2018-08-08 NOTE — PROGRESS NOTES
"  [facility]  PROGRESS NOTE    Date of Admission: 8/7/2018  Length of Stay: 0  Primary Care Physician: Ángela Bonilla, APRN    Chief Complaint: f/u near-syncope, history of NSVT  Problem List:   1. Near-syncope  2. Palpitations  A. ZIO monitor December 2017: SR as predominant rhythm, 1 run of NSVT (12 beats), 15 runs of SVT with longest lasting 10 beats    i.Stress test recommended for NSVT, patient refused  3. Hypothyroidism  4. Hypertension  5. Asthma        Subjective      Patient reports significant productive cough overnight, denies fevers, chills, sweats. No further episodes of near-syncope. Refused stress test this AM as he thought he could not lay flat for 30 minutes secondary to his cough       Objective   Vitals: BP (P) 147/82 (BP Location: Right arm, Patient Position: Standing)   Pulse 71   Temp 98.7 °F (37.1 °C) (Oral)   Resp 16   Ht 175.3 cm (69\")   Wt 102 kg (225 lb 6.4 oz)   SpO2 93%   BMI 33.29 kg/m²     Physical Exam:  GENERAL: Alert, cooperative, in no acute distress.   HEENT: Normocephalic, no jugular venous distention  HEART: No discrete PMI is noted. Regular rhythm, normal rate, and no murmurs, gallops, or rubs.   LUNGS: Clear to auscultation bilaterally. No wheezing, rales or ronchi.  ABDOMEN: Soft, bowel sounds present, non-tender   NEUROLOGIC: No focal abnormalities involving strength or sensation are noted.   EXTREMITIES: No clubbing, cyanosis, or edema noted.     Results:    Results from last 7 days  Lab Units 08/08/18  0542 08/07/18  1414   WBC 10*3/mm3 15.74* 15.50*   HEMOGLOBIN g/dL 14.5 15.4   HEMATOCRIT % 43.6 45.8   PLATELETS 10*3/mm3 211 217       Results from last 7 days  Lab Units 08/08/18  0542 08/07/18  1414   SODIUM mmol/L 140 139   POTASSIUM mmol/L 3.5 3.6   CHLORIDE mmol/L 105 106   CO2 mmol/L 27.0 28.0   BUN mg/dL 16 17   CREATININE mg/dL 1.16 1.18   GLUCOSE mg/dL 104* 157*      Lab Results   Component Value Date    CHOL 186 " "08/08/2018    TRIG 112 08/08/2018    HDL 34 (L) 08/08/2018     (H) 08/08/2018    AST 16 08/07/2018    ALT 23 08/07/2018       Results from last 7 days  Lab Units 08/08/18  0542   HEMOGLOBIN A1C % 5.90*       Results from last 7 days  Lab Units 08/08/18  0542   CHOLESTEROL mg/dL 186   TRIGLYCERIDES mg/dL 112   HDL CHOL mg/dL 34*   LDL CHOL mg/dL 156*       Results from last 7 days  Lab Units 08/08/18  0542   TSH mIU/mL 2.760     Intake/Output Summary (Last 24 hours) at 08/08/18 1034  Last data filed at 08/08/18 0709   Gross per 24 hour   Intake              480 ml   Output             1300 ml   Net             -820 ml     I personally reviewed the patient's EKG/Telemetry data    Current Medications:    amLODIPine 10 mg Oral Daily   aspirin 81 mg Oral Daily   budesonide-formoterol 2 puff Inhalation BID - RT   levothyroxine 125 mcg Oral Daily   losartan-HCTZ (HYZAAR) 100-25 combo dose  Oral Daily          Assessment and Plan:     1. Near-syncope with history of NSVT  - orthostatic BP's negative  - echo pending  - patient refused stress MPS this AM      2. Hypothyroidism  - TSH within normal limits      3. Productive cough and leukocytosis  - obtain PA and lateral  CXR  - PA and lateral CXR reviewed and no acute process - instructed patient to follow up with PCP in this regard    4. Hyperlipidemia  -   - history of statin intolerance, patient refuses a statin at this time   - follow up with PCP in this regard    Addendum: Echo reviewed by Dr. Burrows and essentially normal, with normal LVSF. We suspect his near-syncopal episodes are neurocardiogenic in nature. We have recommended stress test to rule out an ischemic etiology however patient refuses at this time. We have provided him instructions in staying well hydrated, cutting back on caffeine, and \"getting his head down\" to prevent syncope. We have also recommended a tilt table test as OP. He wishes to follow up with Dr. Burrows and will provide an " appointment for 1 month post discharge.     I, Leonardo Burrows MD, personally performed the services described as documented by the above named individual. I have made any necessary edits and it is both accurate and complete 8/17/2018  3:16 PM    He is stable, asymptomatic and ready for discharge to home on above medical regimen. He will follow up with Dr. Burrows in about 1 month.       Scribed for Leonardo Burrows MD by Annemarie Miguel PA-C.

## 2018-08-08 NOTE — PLAN OF CARE
Problem: Fall Risk (Adult)  Goal: Identify Related Risk Factors and Signs and Symptoms  Outcome: Ongoing (interventions implemented as appropriate)    Goal: Absence of Fall  Outcome: Ongoing (interventions implemented as appropriate)      Problem: Patient Care Overview  Goal: Plan of Care Review  Outcome: Ongoing (interventions implemented as appropriate)   08/08/18 0401   Coping/Psychosocial   Plan of Care Reviewed With patient   Plan of Care Review   Progress no change   OTHER   Outcome Summary Patient denies diziness/weakness this shift. VSS. Up with stand-by assist. Voiding adequately. NPO since midnight for stress test today.

## 2018-08-08 NOTE — PROGRESS NOTES
Discharge Planning Assessment  [facility]     Patient Name: Edward Rodas  MRN: [id number]  Today's Date: 8/8/2018    Admit Date: 8/7/2018          Discharge Needs Assessment     Row Name 08/08/18 1133       Living Environment    Lives With spouse    Name(s) of Who Lives With Patient Eleonora Rodas    Current Living Arrangements home/apartment/condo    Primary Care Provided by self    Provides Primary Care For no one    Family Caregiver if Needed spouse    Family Caregiver Names Eleonora Burrows    Quality of Family Relationships helpful;involved    Able to Return to Prior Arrangements yes       Resource/Environmental Concerns    Resource/Environmental Concerns none       Transition Planning    Patient/Family Anticipates Transition to home    Patient/Family Anticipated Services at Transition none    Transportation Anticipated family or friend will provide       Discharge Needs Assessment    Readmission Within the Last 30 Days no previous admission in last 30 days    Concerns to be Addressed financial/insurance    Equipment Currently Used at Home none    Anticipated Changes Related to Illness none    Equipment Needed After Discharge none            Discharge Plan     Row Name 08/08/18 1134       Plan    Plan home    Patient/Family in Agreement with Plan yes    Plan Comments Pt lives in [facility] with his wife. He is independent with all ADLs, denies use of any DME/HH. Pt is followed by his PCP and reports he does not have medication coverage and pays out of pocket. He reports his plan for discharge is to return home. CM to follow.        Destination     No service coordination in this encounter.      Durable Medical Equipment     No service coordination in this encounter.      Dialysis/Infusion     No service coordination in this encounter.      Home Medical Care     No service coordination in this encounter.      Social Care     No service coordination in this encounter.                Demographic Summary     Row  Name 08/08/18 1132       General Information    Admission Type observation    Referral Source physician    Reason for Consult discharge planning    Preferred Language English    General Information Comments PCP Ángela Bonilla       Contact Information    Permission Granted to Share Info With ;family/designee    Contact Information Comments Eleonora Rodas [phone]            Functional Status     Row Name 08/08/18 1133       Functional Status, IADL    Medications independent    Meal Preparation independent    Housekeeping independent    Laundry independent    Shopping independent       Mental Status    General Appearance WDL WDL       Mental Status Summary    Recent Changes in Mental Status/Cognitive Functioning no changes            Psychosocial    No documentation.           Abuse/Neglect    No documentation.           Legal    No documentation.           Substance Abuse    No documentation.           Patient Forms    No documentation.         Ángela Mae RN

## 2018-08-09 NOTE — PROGRESS NOTES
Case Management Discharge Note    Final Note: home; no needs    Destination     No service has been selected for the patient.      Durable Medical Equipment     No service has been selected for the patient.      Dialysis/Infusion     No service has been selected for the patient.      Home Medical Care     No service has been selected for the patient.      Social Care     No service has been selected for the patient.             Final Discharge Disposition Code: 01 - home or self-care

## 2018-08-20 LAB — GLUCOSE BLDC GLUCOMTR-MCNC: 112 MG/DL (ref 70–130)

## 2018-08-28 ENCOUNTER — APPOINTMENT (OUTPATIENT)
Dept: CARDIOLOGY | Facility: HOSPITAL | Age: 78
End: 2018-08-28
Attending: INTERNAL MEDICINE

## 2018-08-28 ENCOUNTER — APPOINTMENT (OUTPATIENT)
Dept: CARDIOLOGY | Facility: HOSPITAL | Age: 78
End: 2018-08-28

## 2018-11-01 ENCOUNTER — OFFICE VISIT (OUTPATIENT)
Dept: CARDIOLOGY | Facility: CLINIC | Age: 78
End: 2018-11-01

## 2018-11-01 VITALS
RESPIRATION RATE: 16 BRPM | SYSTOLIC BLOOD PRESSURE: 148 MMHG | HEIGHT: 69 IN | HEART RATE: 82 BPM | WEIGHT: 226 LBS | BODY MASS INDEX: 33.47 KG/M2 | DIASTOLIC BLOOD PRESSURE: 85 MMHG

## 2018-11-01 DIAGNOSIS — I10 ESSENTIAL HYPERTENSION: ICD-10-CM

## 2018-11-01 DIAGNOSIS — R55 NEAR SYNCOPE: Primary | ICD-10-CM

## 2018-11-01 DIAGNOSIS — I47.1 NONSUSTAINED SUPRAVENTRICULAR TACHYCARDIA (HCC): ICD-10-CM

## 2018-11-01 DIAGNOSIS — I47.29 NONSUSTAINED VENTRICULAR TACHYCARDIA (HCC): ICD-10-CM

## 2018-11-01 PROBLEM — I47.10 NONSUSTAINED SUPRAVENTRICULAR TACHYCARDIA: Status: ACTIVE | Noted: 2018-11-01

## 2018-11-01 PROCEDURE — 93000 ELECTROCARDIOGRAM COMPLETE: CPT | Performed by: INTERNAL MEDICINE

## 2018-11-01 PROCEDURE — 99214 OFFICE O/P EST MOD 30 MIN: CPT | Performed by: INTERNAL MEDICINE

## 2018-11-01 RX ORDER — RANITIDINE 150 MG/1
150 TABLET ORAL AS NEEDED
COMMUNITY
End: 2019-12-11

## 2018-11-01 RX ORDER — VALSARTAN AND HYDROCHLOROTHIAZIDE 160; 12.5 MG/1; MG/1
1 TABLET, FILM COATED ORAL DAILY
COMMUNITY
End: 2021-03-17 | Stop reason: SDUPTHER

## 2018-11-01 RX ORDER — HYDROXYZINE HYDROCHLORIDE 10 MG/1
10 TABLET, FILM COATED ORAL AS NEEDED
Status: ON HOLD | COMMUNITY
End: 2021-08-25

## 2018-11-01 NOTE — PROGRESS NOTES
Ángela Bonilla, APRN  Edward Rodas  1940 11/01/2018    Patient Active Problem List   Diagnosis   • Essential hypertension   • Palpitations   • Near syncope   • Nonsustained supraventricular tachycardia (CMS/HCC)   • Nonsustained ventricular tachycardia (CMS/HCC)       Ángela Santana, APRN:    Subjective     Edward Rodas is a 78 y.o. male with the problems as listed above, presents    Chief complaint: Recent history of near syncopal episodes and documented episodes of nonsustained supraventricular ventricular tachycardia on the event monitor    History of Present Illness: Mr. Rodas is a pleasant 78-year-old  male with recent episodes of near syncope in August of 2018.  His first episode occurred while he was standing in the kitchen when he suddenly got dizzy and thought his sugar was low and he and drank some chocolate syrup and milk and felt better.  Then he had another episode next day again while he was sitting at the kitchen table.  This occurred apparently after a few minutes after eating the breakfast that he felt really dizzy but not actually passed out.  They like grayish dark lines across his eyes which lasted 2-3 minutes and resolve spontaneously.  He had no associated symptoms of palpitations, chest discomfort or shortness of breath.  He was subsequently evaluated at Dr. Burrows office in [place] and had event monitor which apparently revealed short runs of SVT and nonsustained ventricular tachycardia.  He had an echo Doppler study that apparently revealed no significant structural heart disease with only mild aortic incompetence noted.  Patient was recommended to have a nuclear stress test which he has not had done so far.      On further questioning he denies any complaints of chest pains or shortness of breath.  He had another episode reportedly of dizziness couple of days ago when he was busy all day celebrating a birthday party and then came home and then had an episode where  he noted to see a gray line across his lower part of his eyes with some dizziness which was transient and lasted for a few seconds and then resolved spontaneously.  He did not pass out.  He did not have any associated palpitations this time either.      Allergies   Allergen Reactions   • Diprivan [Propofol] Angioedema   • Shellfish-Derived Products Hives and Itching   • Statins Other (See Comments)     Aches and confusion   :      Current Outpatient Prescriptions:   •  albuterol (PROVENTIL) (2.5 MG/3ML) 0.083% nebulizer solution, Take 2.5 mg by nebulization Every 4 (Four) Hours As Needed for Wheezing., Disp: , Rfl:   •  amLODIPine (NORVASC) 10 MG tablet, Take 10 mg by mouth Daily., Disp: , Rfl:   •  aspirin 81 MG EC tablet, Take 81 mg by mouth 3 (Three) Times a Week., Disp: , Rfl:   •  budesonide-formoterol (SYMBICORT) 160-4.5 MCG/ACT inhaler, Inhale 2 puffs 2 (Two) Times a Day., Disp: , Rfl:   •  hydrOXYzine (ATARAX) 10 MG tablet, Take 10 mg by mouth 3 (Three) Times a Day As Needed for Itching., Disp: , Rfl:   •  levothyroxine (SYNTHROID, LEVOTHROID) 125 MCG tablet, Take 125 mcg by mouth Daily., Disp: , Rfl:   •  Multiple Vitamin (MULTI VITAMIN DAILY PO), Take  by mouth., Disp: , Rfl:   •  Omega-3 Fatty Acids (FISH OIL) 1000 MG capsule capsule, Take 3,000 mg by mouth Daily With Breakfast., Disp: , Rfl:   •  raNITIdine (ZANTAC) 150 MG tablet, Take 150 mg by mouth As Needed for Heartburn., Disp: , Rfl:   •  valsartan-hydrochlorothiazide (DIOVAN-HCT) 160-12.5 MG per tablet, Take 1 tablet by mouth Daily., Disp: , Rfl:       The following portions of the patient's history were reviewed and updated as appropriate: allergies, current medications, past family history, past medical history, past social history, past surgical history and problem list.    Social History   Substance Use Topics   • Smoking status: Former Smoker     Packs/day: 1.00     Types: Cigarettes     Quit date: 1958   • Smokeless tobacco: Never Used   •  "Alcohol use No       Review of Systems   Constitution: Positive for malaise/fatigue. Negative for chills and fever.   HENT: Negative for nosebleeds and sore throat.    Cardiovascular: Positive for near-syncope. Negative for chest pain and leg swelling.   Respiratory: Positive for shortness of breath, sputum production and wheezing. Negative for cough and hemoptysis.    Gastrointestinal: Negative for abdominal pain, hematemesis, hematochezia, melena, nausea and vomiting.   Genitourinary: Negative for dysuria and hematuria.   Neurological: Positive for dizziness. Negative for focal weakness and headaches.       Objective   Vitals:    11/01/18 0942 11/01/18 0950   BP: 155/81 148/85   BP Location: Right arm Left arm   Pulse: 82 82   Resp: 16    Weight: 103 kg (226 lb)    Height: 175.3 cm (69\")      Body mass index is 33.37 kg/m².        Physical Exam   Constitutional: He is oriented to person, place, and time. He appears well-developed and well-nourished.   HENT:   Mouth/Throat: Oropharynx is clear and moist.   Eyes: Pupils are equal, round, and reactive to light. EOM are normal.   Neck: Neck supple. No JVD present. No tracheal deviation present. No thyromegaly present.   Cardiovascular: Normal rate, regular rhythm, S1 normal and S2 normal.  Exam reveals no gallop, no S3, no S4 and no friction rub.    No murmur heard.  Pulmonary/Chest: Effort normal and breath sounds normal.   Abdominal: Soft. Bowel sounds are normal. He exhibits no mass. There is no tenderness.   Musculoskeletal: Normal range of motion. He exhibits no edema.   Lymphadenopathy:     He has no cervical adenopathy.   Neurological: He is alert and oriented to person, place, and time.   Skin: Skin is warm and dry. No rash noted.   Psychiatric: He has a normal mood and affect.       Lab Results   Component Value Date     08/08/2018    K 3.5 08/08/2018     08/08/2018    CO2 27.0 08/08/2018    BUN 16 08/08/2018    CREATININE 1.16 08/08/2018    " GLUCOSE 104 (H) 08/08/2018    CALCIUM 9.0 08/08/2018    AST 16 08/07/2018    ALT 23 08/07/2018    ALKPHOS 97 08/07/2018     No results found for: CKTOTAL  Lab Results   Component Value Date    WBC 15.74 (H) 08/08/2018    HGB 14.5 08/08/2018    HCT 43.6 08/08/2018     08/08/2018     Lab Results   Component Value Date    INR 0.99 12/04/2017    INR 0.98 07/10/2017     Lab Results   Component Value Date    MG 2.2 08/08/2018     Lab Results   Component Value Date    TSH 2.760 08/08/2018    TRIG 112 08/08/2018    HDL 34 (L) 08/08/2018     (H) 08/08/2018          ECG 12 Lead  Date/Time: 11/1/2018 10:02 AM  Performed by: JAE WALTERS  Authorized by: JAE WALTERS   Rhythm: sinus rhythm  BPM: 74  Conduction: conduction normal  ST Segments: ST segments normal  Clinical impression: normal ECG              Assessment/Plan    Diagnosis Plan   1. Near syncope     2. Nonsustained supraventricular tachycardia.    3. Nonsustained ventricular tachycardia  Stress Test With Myocardial Perfusion.   4. Essential hypertension         Recommendations:  1.  I've discussed with him about the option of adding a beta blocker such as metoprolol to hopefully help with his tachyarrhythmias but patient is totally reluctant to take any new medications and hence will not start him on any beta blocker for now.  2. We will evaluate further for any occult myocardial ischemia with a stress sestamibi study.  3. Follow up in 4 weeks.    Return in about 4 weeks (around 11/29/2018).    As always, Ángela,  I appreciate very much the opportunity to participate in the cardiovascular care of your patients. Please do not hesitate to call me with any questions with regards to Edward Rodas evaluation and management.     With Best Regards,        Jae Walters MD, [facility]    Dragon disclaimer:  Much of this encounter note is an electronic transcription/translation of spoken language to printed text. The electronic translation of spoken language  may permit erroneous, or at times, nonsensical words or phrases to be inadvertently transcribed; Although I have reviewed the note for such errors, some may still exist.

## 2018-11-09 ENCOUNTER — APPOINTMENT (OUTPATIENT)
Dept: CARDIOLOGY | Facility: HOSPITAL | Age: 78
End: 2018-11-09
Attending: INTERNAL MEDICINE

## 2018-11-09 ENCOUNTER — APPOINTMENT (OUTPATIENT)
Dept: CARDIOLOGY | Facility: HOSPITAL | Age: 78
End: 2018-11-09

## 2018-11-09 ENCOUNTER — HOSPITAL ENCOUNTER (OUTPATIENT)
Dept: CARDIOLOGY | Facility: HOSPITAL | Age: 78
End: 2018-11-09
Attending: INTERNAL MEDICINE

## 2018-11-29 ENCOUNTER — HOSPITAL ENCOUNTER (OUTPATIENT)
Dept: CARDIOLOGY | Facility: HOSPITAL | Age: 78
End: 2018-11-29
Attending: INTERNAL MEDICINE

## 2018-11-29 ENCOUNTER — HOSPITAL ENCOUNTER (OUTPATIENT)
Dept: NUCLEAR MEDICINE | Facility: HOSPITAL | Age: 78
Discharge: HOME | End: 2018-11-29
Attending: INTERNAL MEDICINE

## 2018-11-29 ENCOUNTER — HOSPITAL ENCOUNTER (OUTPATIENT)
Dept: NUCLEAR MEDICINE | Facility: HOSPITAL | Age: 78
End: 2018-11-29
Attending: INTERNAL MEDICINE

## 2018-11-29 DIAGNOSIS — R06.02 SOB (SHORTNESS OF BREATH): ICD-10-CM

## 2018-11-29 DIAGNOSIS — R06.02 SOB (SHORTNESS OF BREATH): Primary | ICD-10-CM

## 2018-11-29 DIAGNOSIS — I47.29 NONSUSTAINED VENTRICULAR TACHYCARDIA (HCC): ICD-10-CM

## 2018-11-29 DIAGNOSIS — I47.1 NONSUSTAINED SUPRAVENTRICULAR TACHYCARDIA (HCC): ICD-10-CM

## 2019-01-07 ENCOUNTER — HOSPITAL ENCOUNTER (OUTPATIENT)
Dept: NUCLEAR MEDICINE | Facility: HOSPITAL | Age: 79
Discharge: HOME | End: 2019-01-07
Attending: INTERNAL MEDICINE

## 2019-01-07 ENCOUNTER — HOSPITAL ENCOUNTER (OUTPATIENT)
Dept: CARDIOLOGY | Facility: HOSPITAL | Age: 79
End: 2019-01-07
Attending: INTERNAL MEDICINE

## 2019-01-07 ENCOUNTER — HOSPITAL ENCOUNTER (OUTPATIENT)
Dept: CARDIOLOGY | Facility: HOSPITAL | Age: 79
Discharge: HOME | End: 2019-01-07
Attending: INTERNAL MEDICINE

## 2019-01-07 ENCOUNTER — HOSPITAL ENCOUNTER (OUTPATIENT)
Dept: NUCLEAR MEDICINE | Facility: HOSPITAL | Age: 79
End: 2019-01-07
Attending: INTERNAL MEDICINE

## 2019-01-07 DIAGNOSIS — R06.02 SHORTNESS OF BREATH: ICD-10-CM

## 2019-01-07 PROCEDURE — 0 TECHNETIUM SESTAMIBI: Performed by: INTERNAL MEDICINE

## 2019-01-07 PROCEDURE — 93018 CV STRESS TEST I&R ONLY: CPT | Performed by: INTERNAL MEDICINE

## 2019-01-07 PROCEDURE — A9500 TC99M SESTAMIBI: HCPCS | Performed by: INTERNAL MEDICINE

## 2019-01-07 PROCEDURE — 78452 HT MUSCLE IMAGE SPECT MULT: CPT | Performed by: INTERNAL MEDICINE

## 2019-01-07 PROCEDURE — 93017 CV STRESS TEST TRACING ONLY: CPT

## 2019-01-07 PROCEDURE — 25010000002 REGADENOSON 0.4 MG/5ML SOLUTION: Performed by: INTERNAL MEDICINE

## 2019-01-07 PROCEDURE — 78452 HT MUSCLE IMAGE SPECT MULT: CPT

## 2019-01-07 RX ADMIN — REGADENOSON 0.4 MG: 0.08 INJECTION, SOLUTION INTRAVENOUS at 09:34

## 2019-01-07 RX ADMIN — TECHNETIUM TC 99M SESTAMIBI 1 DOSE: 1 INJECTION INTRAVENOUS at 09:34

## 2019-01-07 RX ADMIN — TECHNETIUM TC 99M SESTAMIBI 1 DOSE: 1 INJECTION INTRAVENOUS at 07:45

## 2019-01-09 LAB
BH CV NUCLEAR PRIOR STUDY: 3
BH CV STRESS BP STAGE 1: NORMAL
BH CV STRESS BP STAGE 2: NORMAL
BH CV STRESS COMMENTS STAGE 1: NORMAL
BH CV STRESS COMMENTS STAGE 2: NORMAL
BH CV STRESS DOSE REGADENOSON STAGE 1: 0.4
BH CV STRESS DURATION MIN STAGE 1: 0
BH CV STRESS DURATION MIN STAGE 2: 4
BH CV STRESS DURATION SEC STAGE 1: 10
BH CV STRESS DURATION SEC STAGE 2: 0
BH CV STRESS HR STAGE 1: 96
BH CV STRESS HR STAGE 2: 85
BH CV STRESS PROTOCOL 1: NORMAL
BH CV STRESS RECOVERY BP: NORMAL MMHG
BH CV STRESS RECOVERY HR: 85 BPM
BH CV STRESS STAGE 1: 1
BH CV STRESS STAGE 2: 2
LV EF NUC BP: 58 %
MAXIMAL PREDICTED HEART RATE: 140 BPM
PERCENT MAX PREDICTED HR: 68.57 %
STRESS BASELINE BP: NORMAL MMHG
STRESS BASELINE HR: 72 BPM
STRESS PERCENT HR: 81 %
STRESS POST PEAK BP: NORMAL MMHG
STRESS POST PEAK HR: 96 BPM
STRESS TARGET HR: 119 BPM

## 2019-01-11 ENCOUNTER — TELEPHONE (OUTPATIENT)
Dept: CARDIOLOGY | Facility: CLINIC | Age: 79
End: 2019-01-11

## 2019-01-11 NOTE — TELEPHONE ENCOUNTER
Called pt and he stated that he has had these symptoms before and that they feel the same as before. I advised him to keep his apt on 1/24/19 and to go to the ER if they get worse.  He stated that as long as he his up moving around he heart feels fine but when he sits down that when he feels his palpations more.           Notes recorded by Virginia Angelo MA on 1/10/2019 at 8:54 AM EST  Called pt and he stated that he does plan on being here for his apt on 24th.  ------    Notes recorded by Suleiman Man PA-C on 1/10/2019 at 8:27 AM EST  Will discuss in detail at follow on 1/24 please make sure patient is aware and is still panning on making that apointment.

## 2019-01-11 NOTE — TELEPHONE ENCOUNTER
Patient had a stress test on Monday. He is having some new issues that he would like to talk to somebody about. He says his heart feels like is skipping beats. Can someone call him back asap?     Thanks!

## 2019-01-14 ENCOUNTER — TELEPHONE (OUTPATIENT)
Dept: CARDIOLOGY | Facility: CLINIC | Age: 79
End: 2019-01-14

## 2019-01-14 NOTE — TELEPHONE ENCOUNTER
Called pt and he stated that when he is up moving around his HR is fine but after he has been sitting for 10-15 minutes he can feel his heart beat for like 3 beats and then skip a beat. This started on Thursday (1/10/19). He stated that in December he had these symptoms start right before Santana and they stopped after Santana and just started back on Thursday. He also stated that December made a year since the last episode of these symptoms. He has an apt on 1/24/19 with Louann. Does he need to come in sooner?

## 2019-01-15 NOTE — TELEPHONE ENCOUNTER
Called pt and he stated that he would like to come in sooner. I advised him that the soonest would be 1/22/19 with Louann at 1:20 pm. He stated that he would take the 22nd apt. I CA his 24th apt and moved him to the 22nd. He stated that he would be here.

## 2019-01-15 NOTE — TELEPHONE ENCOUNTER
I guess if we can get him in sooner that would be good, otherwise will address at apointment on 1/24

## 2019-01-22 ENCOUNTER — OFFICE VISIT (OUTPATIENT)
Dept: CARDIOLOGY | Facility: CLINIC | Age: 79
End: 2019-01-22

## 2019-01-22 VITALS
RESPIRATION RATE: 16 BRPM | BODY MASS INDEX: 34.07 KG/M2 | DIASTOLIC BLOOD PRESSURE: 71 MMHG | HEIGHT: 69 IN | SYSTOLIC BLOOD PRESSURE: 136 MMHG | WEIGHT: 230 LBS | HEART RATE: 86 BPM

## 2019-01-22 DIAGNOSIS — R00.2 PALPITATIONS: ICD-10-CM

## 2019-01-22 DIAGNOSIS — R94.39 ABNORMAL NUCLEAR STRESS TEST: ICD-10-CM

## 2019-01-22 DIAGNOSIS — I10 ESSENTIAL HYPERTENSION: Primary | ICD-10-CM

## 2019-01-22 DIAGNOSIS — I47.1 NONSUSTAINED SUPRAVENTRICULAR TACHYCARDIA (HCC): ICD-10-CM

## 2019-01-22 PROCEDURE — 99214 OFFICE O/P EST MOD 30 MIN: CPT | Performed by: NURSE PRACTITIONER

## 2019-01-22 PROCEDURE — 93000 ELECTROCARDIOGRAM COMPLETE: CPT | Performed by: NURSE PRACTITIONER

## 2019-01-22 RX ORDER — UBIDECARENONE 100 MG
100 CAPSULE ORAL DAILY
COMMUNITY
End: 2019-12-11

## 2019-01-22 NOTE — PROGRESS NOTES
Ángela Bonilla, APRN  Edward Rodas  1940 01/22/2019    Patient Active Problem List   Diagnosis   • Essential hypertension   • Palpitations   • Near syncope   • Nonsustained supraventricular tachycardia (CMS/HCC)   • Nonsustained ventricular tachycardia (CMS/HCC)   • Abnormal nuclear stress test       Ángela Santana, APRN:    Subjective     Chief Complaint   Patient presents with   • Follow-up     stress findings   • Palpitations     skipping   • Shortness of Breath     mod exertion   • Edema     feet   • Med Management     list provided           History of Present Illness:    Edward Rodas is a 78 y.o. male with a history of hypertension, documented episodes of nonsustained supraventricular tachycardia and nonsustained ventricular tachycardia in the past.  He initially presented with dizziness and near syncopal episodes.  He been evaluated previously by Dr. Burrows in [place].  His event monitor revealed short runs of SVT and nonsustained ventricular tachycardia.  An echocardiogram revealed normal EF with no evidence of significant valvular abnormality.  At that time potassium and magnesium were normal as well.  He recently underwent further evaluation with Lexiscan sestamibi study.  This revealed a small size, mild degree of ischemia in the inferior wall.  The patient denies any chest pains. He does have some dyspnea with moderate exertion. He has occasional edema in his feet which resolves in the morning hours. Recently he describes having an irregular heart beat. He states he does not feel any palpitations during this time. However, he checks his pulse and feels an irregular beat. He has had no further near syncopal episodes. He has had some intermittent dizziness. He denies syncope.    He has no dye allergy    Allergies   Allergen Reactions   • Diprivan [Propofol] Angioedema   • Shellfish-Derived Products Hives and Itching   • Statins Other (See Comments)     Aches and confusion   :      Current  Outpatient Medications:   •  albuterol (PROVENTIL) (2.5 MG/3ML) 0.083% nebulizer solution, Take 2.5 mg by nebulization Every 4 (Four) Hours As Needed for Wheezing., Disp: , Rfl:   •  amLODIPine (NORVASC) 10 MG tablet, Take 10 mg by mouth Daily., Disp: , Rfl:   •  aspirin 81 MG EC tablet, Take 81 mg by mouth 3 (Three) Times a Week., Disp: , Rfl:   •  budesonide-formoterol (SYMBICORT) 160-4.5 MCG/ACT inhaler, Inhale 2 puffs 2 (Two) Times a Day., Disp: , Rfl:   •  coenzyme Q10 100 MG capsule, Take 100 mg by mouth Daily., Disp: , Rfl:   •  hydrOXYzine (ATARAX) 10 MG tablet, Take 10 mg by mouth 3 (Three) Times a Day As Needed for Itching., Disp: , Rfl:   •  levothyroxine (SYNTHROID, LEVOTHROID) 125 MCG tablet, Take 125 mcg by mouth Daily., Disp: , Rfl:   •  MAGNESIUM CITRATE PO, Take 250 mg by mouth Daily., Disp: , Rfl:   •  Multiple Vitamin (MULTI VITAMIN DAILY PO), Take  by mouth., Disp: , Rfl:   •  Omega-3 Fatty Acids (FISH OIL) 1000 MG capsule capsule, Take 3,000 mg by mouth Daily With Breakfast., Disp: , Rfl:   •  raNITIdine (ZANTAC) 150 MG tablet, Take 150 mg by mouth As Needed for Heartburn., Disp: , Rfl:   •  valsartan-hydrochlorothiazide (DIOVAN-HCT) 160-12.5 MG per tablet, Take 1 tablet by mouth Daily., Disp: , Rfl:   •  metoprolol tartrate (LOPRESSOR) 25 MG tablet, Take 1 tablet by mouth 2 (Two) Times a Day., Disp: 60 tablet, Rfl: 11      The following portions of the patient's history were reviewed and updated as appropriate: allergies, current medications, past family history, past medical history, past social history, past surgical history and problem list.    Social History     Tobacco Use   • Smoking status: Former Smoker     Packs/day: 1.00     Types: Cigarettes     Last attempt to quit: 1958     Years since quittin.0   • Smokeless tobacco: Never Used   Substance Use Topics   • Alcohol use: No   • Drug use: No       Review of Systems   Constitution: Negative for weakness and malaise/fatigue.  "  Cardiovascular: Positive for dyspnea on exertion, near-syncope and palpitations. Negative for chest pain and syncope.   Respiratory: Negative for cough, shortness of breath and wheezing.    Neurological: Negative for dizziness and light-headedness.       Objective   Vitals:    01/22/19 1340   BP: 136/71   Pulse: 86   Resp: 16   Weight: 104 kg (230 lb)   Height: 175.3 cm (69\")     Body mass index is 33.97 kg/m².        Physical Exam   Constitutional: He is oriented to person, place, and time. He appears well-developed and well-nourished.   HENT:   Head: Normocephalic and atraumatic.   Cardiovascular: Normal rate, regular rhythm and normal heart sounds. Exam reveals no S3 and no S4.   No murmur heard.  Pulmonary/Chest: Effort normal and breath sounds normal. He has no wheezes. He has no rales.   Abdominal: Soft. Bowel sounds are normal.   Musculoskeletal: He exhibits no edema.   Neurological: He is alert and oriented to person, place, and time.   Skin: Skin is warm and dry.   Psychiatric: He has a normal mood and affect. His behavior is normal.       Lab Results   Component Value Date     08/08/2018    K 3.5 08/08/2018     08/08/2018    CO2 27.0 08/08/2018    BUN 16 08/08/2018    CREATININE 1.16 08/08/2018    GLUCOSE 104 (H) 08/08/2018    CALCIUM 9.0 08/08/2018    AST 16 08/07/2018    ALT 23 08/07/2018    ALKPHOS 97 08/07/2018     No results found for: CKTOTAL  Lab Results   Component Value Date    WBC 15.74 (H) 08/08/2018    HGB 14.5 08/08/2018    HCT 43.6 08/08/2018     08/08/2018     Lab Results   Component Value Date    INR 0.99 12/04/2017    INR 0.98 07/10/2017     Lab Results   Component Value Date    MG 2.2 08/08/2018     Lab Results   Component Value Date    TSH 2.760 08/08/2018    TRIG 112 08/08/2018    HDL 34 (L) 08/08/2018     (H) 08/08/2018              ECG 12 Lead  Date/Time: 1/22/2019 3:12 PM  Performed by: Louann Pardo APRN  Authorized by: Louann Pardo APRN "   Comparison: compared with previous ECG   Similar to previous ECG  Rhythm: sinus rhythm  BPM: 72                Assessment/Plan    Diagnosis Plan   1. Essential hypertension  metoprolol tartrate (LOPRESSOR) 25 MG tablet   2. Palpitations  ECG 12 Lead    metoprolol tartrate (LOPRESSOR) 25 MG tablet   3. Nonsustained supraventricular tachycardia (CMS/HCC)  ECG 12 Lead    metoprolol tartrate (LOPRESSOR) 25 MG tablet   4. Abnormal nuclear stress test  metoprolol tartrate (LOPRESSOR) 25 MG tablet                Recommendations:    1. I have discussed the stress test results at length with the patient.    2. For his palpitations and history of ventricular tachycardia, will start metoprolol tartrate 25 mg PO BID.    3.  I have discussed with Dr. Payan. Since the patient has documented ventricular tachycardia with recent abnormal stress test, he recommends cardiac catheterization to rule out any underlying coronary artery disease. I have discussed with the patient about the procedure of cardiac catheterization, potential risks, benefits and alternative methods of management.  Patient expressed understanding of the same and is wanting to proceed. However, he wants to discuss this with his son prior to scheduling the procedure. I have asked him to contact us in 1-2 days so we can schedule this. He voices understanding.          Return in about 4 weeks (around 2/19/2019) for Recheck.    As always, I appreciate very much the opportunity to participate in the cardiovascular care of your patients.      With Best Regards,    LORENZO Moses

## 2019-01-25 ENCOUNTER — TELEPHONE (OUTPATIENT)
Dept: CARDIOLOGY | Facility: CLINIC | Age: 79
End: 2019-01-25

## 2019-02-26 ENCOUNTER — OFFICE VISIT (OUTPATIENT)
Dept: CARDIOLOGY | Facility: CLINIC | Age: 79
End: 2019-02-26

## 2019-02-26 VITALS
SYSTOLIC BLOOD PRESSURE: 146 MMHG | WEIGHT: 231 LBS | HEIGHT: 69 IN | DIASTOLIC BLOOD PRESSURE: 73 MMHG | HEART RATE: 74 BPM | BODY MASS INDEX: 34.21 KG/M2 | RESPIRATION RATE: 18 BRPM | OXYGEN SATURATION: 98 %

## 2019-02-26 DIAGNOSIS — R94.39 ABNORMAL NUCLEAR STRESS TEST: ICD-10-CM

## 2019-02-26 DIAGNOSIS — I10 ESSENTIAL HYPERTENSION: Primary | ICD-10-CM

## 2019-02-26 DIAGNOSIS — I47.29 NONSUSTAINED VENTRICULAR TACHYCARDIA (HCC): ICD-10-CM

## 2019-02-26 DIAGNOSIS — R55 NEAR SYNCOPE: ICD-10-CM

## 2019-02-26 DIAGNOSIS — I47.1 NONSUSTAINED SUPRAVENTRICULAR TACHYCARDIA (HCC): ICD-10-CM

## 2019-02-26 PROCEDURE — 99213 OFFICE O/P EST LOW 20 MIN: CPT | Performed by: NURSE PRACTITIONER

## 2019-02-26 NOTE — PROGRESS NOTES
Ángela Bonilla, APRN  Edward Rodas  1940 02/26/2019    Patient Active Problem List   Diagnosis   • Essential hypertension   • Palpitations   • Near syncope   • Nonsustained supraventricular tachycardia (CMS/HCC)   • Nonsustained ventricular tachycardia (CMS/HCC)   • Abnormal nuclear stress test       Ángela Santana, APRN:    Subjective     Chief Complaint   Patient presents with   • Essential hypertension       History of Present Illness:    Edward Rodas is a 79 y.o. male with a history of hypertension, documented episodes of nonsustained supraventricular tachycardia, and nonsustained ventricular tachycardia in the past.  Previously, electrolytes were normal.  An echocardiogram revealed a normal EF with no valvular abnormality.  The patient underwent further evaluation with Lexiscan sestamibi study.  This revealed a small size, mild degree of ischemia in the inferior wall.  Cardiac catheterization was recommended to rule out underlying ischemia as the cause of ventricular arrhythmias.  However the patient refused.  Metoprolol was initiated.  He presents today for routine cardiology follow-up.  Reports he has had intermittent palpitations.  These became severe 3-4 days ago.  However over the last few days he has had no more palpitations.  Reports has been feeling well.  Denies chest pain, shortness of breath, dizziness, lightheadedness, near syncope, and syncope.          Allergies   Allergen Reactions   • Diprivan [Propofol] Angioedema   • Shellfish-Derived Products Hives and Itching   • Statins Other (See Comments)     Aches and confusion   :      Current Outpatient Medications:   •  albuterol (PROVENTIL) (2.5 MG/3ML) 0.083% nebulizer solution, Take 2.5 mg by nebulization Every 4 (Four) Hours As Needed for Wheezing., Disp: , Rfl:   •  amLODIPine (NORVASC) 10 MG tablet, Take 10 mg by mouth Daily., Disp: , Rfl:   •  aspirin 81 MG EC tablet, Take 81 mg by mouth 3 (Three) Times a Week., Disp: , Rfl:    •  budesonide-formoterol (SYMBICORT) 160-4.5 MCG/ACT inhaler, Inhale 2 puffs 2 (Two) Times a Day., Disp: , Rfl:   •  coenzyme Q10 100 MG capsule, Take 100 mg by mouth Daily., Disp: , Rfl:   •  hydrOXYzine (ATARAX) 10 MG tablet, Take 10 mg by mouth 3 (Three) Times a Day As Needed for Itching., Disp: , Rfl:   •  levothyroxine (SYNTHROID, LEVOTHROID) 125 MCG tablet, Take 125 mcg by mouth Daily., Disp: , Rfl:   •  MAGNESIUM CITRATE PO, Take 250 mg by mouth Daily., Disp: , Rfl:   •  metoprolol tartrate (LOPRESSOR) 25 MG tablet, Take 1 tablet by mouth 2 (Two) Times a Day., Disp: 60 tablet, Rfl: 11  •  Multiple Vitamin (MULTI VITAMIN DAILY PO), Take  by mouth., Disp: , Rfl:   •  Omega-3 Fatty Acids (FISH OIL) 1000 MG capsule capsule, Take 3,000 mg by mouth Daily With Breakfast., Disp: , Rfl:   •  raNITIdine (ZANTAC) 150 MG tablet, Take 150 mg by mouth As Needed for Heartburn., Disp: , Rfl:   •  valsartan-hydrochlorothiazide (DIOVAN-HCT) 160-12.5 MG per tablet, Take 1 tablet by mouth Daily., Disp: , Rfl:       The following portions of the patient's history were reviewed and updated as appropriate: allergies, current medications, past family history, past medical history, past social history, past surgical history and problem list.    Social History     Tobacco Use   • Smoking status: Former Smoker     Packs/day: 1.00     Types: Cigarettes     Last attempt to quit: 1958     Years since quittin.1   • Smokeless tobacco: Never Used   Substance Use Topics   • Alcohol use: No   • Drug use: No       Review of Systems   Constitution: Negative for weakness and malaise/fatigue.   Cardiovascular: Positive for palpitations. Negative for chest pain, dyspnea on exertion, near-syncope and syncope.   Respiratory: Negative for cough, shortness of breath and wheezing.    Neurological: Negative for dizziness and light-headedness.       Objective   Vitals:    19 1453   BP: 146/73   BP Location: Right arm   Patient Position:  "Sitting   Cuff Size: Adult   Pulse: 74   Resp: 18   SpO2: 98%   Weight: 105 kg (231 lb)   Height: 175.3 cm (69\")     Body mass index is 34.11 kg/m².        Physical Exam   Constitutional: He is oriented to person, place, and time. He appears well-developed and well-nourished.   HENT:   Head: Normocephalic and atraumatic.   Cardiovascular: Normal rate, regular rhythm and normal heart sounds. Exam reveals no S3 and no S4.   No murmur heard.  Pulmonary/Chest: Effort normal and breath sounds normal. He has no wheezes. He has no rales.   Abdominal: Soft. Bowel sounds are normal.   Musculoskeletal: He exhibits no edema.   Neurological: He is alert and oriented to person, place, and time.   Skin: Skin is warm and dry.   Psychiatric: He has a normal mood and affect. His behavior is normal.       Lab Results   Component Value Date     08/08/2018    K 3.5 08/08/2018     08/08/2018    CO2 27.0 08/08/2018    BUN 16 08/08/2018    CREATININE 1.16 08/08/2018    GLUCOSE 104 (H) 08/08/2018    CALCIUM 9.0 08/08/2018    AST 16 08/07/2018    ALT 23 08/07/2018    ALKPHOS 97 08/07/2018       Lab Results   Component Value Date    WBC 15.74 (H) 08/08/2018    HGB 14.5 08/08/2018    HCT 43.6 08/08/2018     08/08/2018       Lab Results   Component Value Date    MG 2.2 08/08/2018     Lab Results   Component Value Date    TSH 2.760 08/08/2018    TRIG 112 08/08/2018    HDL 34 (L) 08/08/2018     (H) 08/08/2018       Procedures      Assessment/Plan    Diagnosis Plan   1. Essential hypertension     2. Near syncope     3. Nonsustained supraventricular tachycardia (CMS/HCC)     4. Nonsustained ventricular tachycardia (CMS/HCC)     5. Abnormal nuclear stress test                  Recommendations:    1. Since he has had persistent palpitations, I have recommended increasing metoprolol dosage, but he has declined.    2. Since he does not want to proceed with cardiac catheterization, I have recommended CT coronary angiogram to " rule out any obstructive coronary artery disease which could be responsible for the ventricular arrhythmias since his nuclear stress test was abnormal. He has declined.    3. I have explained the ventricular arrhythmias and risks associated with lack of treatment and evaluation. However, he states at this time he wants to think about this and contact us if he wants to proceed.    4. Follow up in 2 months and PRN.        Return in about 2 months (around 4/26/2019) for Recheck.  POC discussed with Dr. Payan    As always, I appreciate very much the opportunity to participate in the cardiovascular care of your patients.      With Best Regards,    LORENZO Moses

## 2019-03-06 ENCOUNTER — TELEPHONE (OUTPATIENT)
Dept: CARDIOLOGY | Facility: CLINIC | Age: 79
End: 2019-03-06

## 2019-03-06 NOTE — TELEPHONE ENCOUNTER
Ok, we will proceed. Please see if he has ever had a CT with IV contrast. If so, did he tolerate this well? I am asking about this because he has a shellfish allergy. Thanks.

## 2019-03-07 DIAGNOSIS — I42.9 CARDIOMYOPATHY, UNSPECIFIED TYPE (HCC): ICD-10-CM

## 2019-03-07 DIAGNOSIS — R94.39 ABNORMAL NUCLEAR STRESS TEST: Primary | ICD-10-CM

## 2019-03-07 NOTE — TELEPHONE ENCOUNTER
Spoke with patient he stated he had a CT with IV contrast in June or July of 2017 with no issue stated he tolerated it very well.     Patient wanted to know if that CT was abnormal if he would then have to proceed with a cath I spoke with Suleiman since I had the patient on the phone and he was in the office he stated that yes that was the - part of a CT. I did advise that patient of this. He is still wanting to proceed with the CT scan.

## 2019-03-19 ENCOUNTER — HOSPITAL ENCOUNTER (OUTPATIENT)
Dept: CT IMAGING | Facility: HOSPITAL | Age: 79
Discharge: HOME | End: 2019-03-19
Admitting: RADIOLOGY

## 2019-03-19 VITALS
HEART RATE: 78 BPM | TEMPERATURE: 98.7 F | SYSTOLIC BLOOD PRESSURE: 125 MMHG | RESPIRATION RATE: 16 BRPM | HEIGHT: 69 IN | DIASTOLIC BLOOD PRESSURE: 70 MMHG | OXYGEN SATURATION: 98 % | BODY MASS INDEX: 34.36 KG/M2 | WEIGHT: 232 LBS

## 2019-03-19 DIAGNOSIS — R94.39 ABNORMAL NUCLEAR STRESS TEST: ICD-10-CM

## 2019-03-19 DIAGNOSIS — I42.9 CARDIOMYOPATHY, UNSPECIFIED TYPE (HCC): ICD-10-CM

## 2019-03-19 LAB — CREAT BLDA-MCNC: 1.2 MG/DL (ref 0.6–1.3)

## 2019-03-19 PROCEDURE — 82565 ASSAY OF CREATININE: CPT

## 2019-03-19 PROCEDURE — 63710000001 METOPROLOL TARTRATE 25 MG TABLET: Performed by: RADIOLOGY

## 2019-03-19 PROCEDURE — A9270 NON-COVERED ITEM OR SERVICE: HCPCS | Performed by: RADIOLOGY

## 2019-03-19 RX ORDER — METOPROLOL TARTRATE 5 MG/5ML
5 INJECTION INTRAVENOUS
Status: DISCONTINUED | OUTPATIENT
Start: 2019-03-19 | End: 2019-03-21 | Stop reason: HOSPADM

## 2019-03-19 RX ORDER — LIDOCAINE HYDROCHLORIDE 10 MG/ML
5 INJECTION, SOLUTION EPIDURAL; INFILTRATION; INTRACAUDAL; PERINEURAL AS NEEDED
Status: DISCONTINUED | OUTPATIENT
Start: 2019-03-19 | End: 2019-03-21 | Stop reason: HOSPADM

## 2019-03-19 RX ORDER — METOPROLOL TARTRATE 100 MG/1
100 TABLET ORAL ONCE AS NEEDED
Status: DISCONTINUED | OUTPATIENT
Start: 2019-03-19 | End: 2019-03-21 | Stop reason: HOSPADM

## 2019-03-19 RX ORDER — SODIUM CHLORIDE 0.9 % (FLUSH) 0.9 %
3-10 SYRINGE (ML) INJECTION AS NEEDED
Status: DISCONTINUED | OUTPATIENT
Start: 2019-03-19 | End: 2019-03-21 | Stop reason: HOSPADM

## 2019-03-19 RX ORDER — SODIUM CHLORIDE 0.9 % (FLUSH) 0.9 %
3 SYRINGE (ML) INJECTION EVERY 12 HOURS SCHEDULED
Status: DISCONTINUED | OUTPATIENT
Start: 2019-03-19 | End: 2019-03-21 | Stop reason: HOSPADM

## 2019-03-19 RX ORDER — METOPROLOL TARTRATE 50 MG/1
50 TABLET, FILM COATED ORAL ONCE AS NEEDED
Status: DISCONTINUED | OUTPATIENT
Start: 2019-03-19 | End: 2019-03-21 | Stop reason: HOSPADM

## 2019-03-19 RX ORDER — NITROGLYCERIN 0.4 MG/1
0.4 TABLET SUBLINGUAL
Status: DISCONTINUED | OUTPATIENT
Start: 2019-03-19 | End: 2019-03-21 | Stop reason: HOSPADM

## 2019-03-19 RX ADMIN — METOPROLOL TARTRATE 5 MG: 5 INJECTION, SOLUTION INTRAVENOUS at 10:34

## 2019-03-19 RX ADMIN — METOPROLOL TARTRATE 5 MG: 5 INJECTION, SOLUTION INTRAVENOUS at 10:27

## 2019-03-19 RX ADMIN — METOPROLOL TARTRATE 5 MG: 5 INJECTION, SOLUTION INTRAVENOUS at 10:41

## 2019-03-19 RX ADMIN — METOPROLOL TARTRATE 25 MG: 25 TABLET, FILM COATED ORAL at 08:57

## 2019-03-19 NOTE — NURSING NOTE
Lights dimmed in pt room and pt given warm blankets. Will continue to monitor patient while awaiting heart rate to change to acceptable level

## 2019-03-19 NOTE — NURSING NOTE
Pt heart rate ranging by second from 53-79. Pt just received third dose Lopressor and Dr Stanton notified of heart rate irregularity of pt and administration of IV Lopressor

## 2019-03-19 NOTE — NURSING NOTE
"Pt states arms hurting and states he has to \"be pulled out of here for a minute\" and to sit up. Pt given cold rag for head and a emesis basin.   "

## 2019-03-19 NOTE — NURSING NOTE
Dr Stanton states unable to perform exam due to vast irregularity of heart rate. Pt informed and taken to radiology nurses station for post medication monitoring. Pt ordered meal tray, orange juice and Coke per request. Will continue to monitor patient from bedside

## 2019-03-19 NOTE — NURSING NOTE
Pt continues to be monitored in nurses station at this time. Pt denies any needs and reports he is comfortable

## 2019-04-22 ENCOUNTER — OFFICE VISIT (OUTPATIENT)
Dept: CARDIOLOGY | Facility: CLINIC | Age: 79
End: 2019-04-22

## 2019-04-22 VITALS
DIASTOLIC BLOOD PRESSURE: 74 MMHG | SYSTOLIC BLOOD PRESSURE: 133 MMHG | HEIGHT: 69 IN | WEIGHT: 230 LBS | BODY MASS INDEX: 34.07 KG/M2 | OXYGEN SATURATION: 98 % | HEART RATE: 70 BPM

## 2019-04-22 DIAGNOSIS — R94.39 ABNORMAL NUCLEAR STRESS TEST: ICD-10-CM

## 2019-04-22 DIAGNOSIS — I10 ESSENTIAL HYPERTENSION: Primary | ICD-10-CM

## 2019-04-22 DIAGNOSIS — I47.1 NONSUSTAINED SUPRAVENTRICULAR TACHYCARDIA (HCC): ICD-10-CM

## 2019-04-22 DIAGNOSIS — R00.2 PALPITATIONS: ICD-10-CM

## 2019-04-22 PROCEDURE — 99213 OFFICE O/P EST LOW 20 MIN: CPT | Performed by: NURSE PRACTITIONER

## 2019-04-22 NOTE — PROGRESS NOTES
Ángela Bonilla, APRN  Edward Rodas  1940 04/22/2019    Patient Active Problem List   Diagnosis   • Essential hypertension   • Palpitations   • Near syncope   • Nonsustained supraventricular tachycardia (CMS/HCC)   • Nonsustained ventricular tachycardia (CMS/HCC)   • Abnormal nuclear stress test       DeaÁngela Petersen, APRN:    Subjective     Chief Complaint   Patient presents with   • Palpitations     Follow up   • Med Management           History of Present Illness:    Edward Rodas is a 79 y.o. male with past medical history significant for hypertension, documented episodes of nonsustained supraventricular tachycardia, and nonsustained ventricular tachycardia in the past.  Previously, electrolytes were normal, echo revealed normal EF with no valvular abnormality.  A Lexiscan stress test revealed a small size, mild degree of ischemia in the inferior wall.  Patient was to be evaluated further with a CT coronary angiogram.  However, on the day of testing apparently his heart rate was variable from the 50s-70s and the test could not be completed.  He presents today for routine cardiology follow-up.  He reports over the last 4-6 weeks he has been doing extremely well.  He denies any chest pains or shortness of breath.  He has been very physically active and is feeling better.  He does still occasionally have palpitations.  He has noticed his heart rate is variable at home from the 40s-70s.  He denies any near syncopal or syncopal episodes.        Allergies   Allergen Reactions   • Diprivan [Propofol] Angioedema   • Shellfish-Derived Products Hives and Itching   • Statins Other (See Comments)     Aches and confusion   • Sulfa Antibiotics Unknown (See Comments)     States lowers blood sugar   :      Current Outpatient Medications:   •  albuterol (PROVENTIL) (2.5 MG/3ML) 0.083% nebulizer solution, Take 2.5 mg by nebulization Every 4 (Four) Hours As Needed for Wheezing., Disp: , Rfl:   •  amLODIPine (NORVASC)  10 MG tablet, Take 10 mg by mouth Daily., Disp: , Rfl:   •  aspirin 81 MG EC tablet, Take 81 mg by mouth 3 (Three) Times a Week., Disp: , Rfl:   •  budesonide-formoterol (SYMBICORT) 160-4.5 MCG/ACT inhaler, Inhale 2 puffs 2 (Two) Times a Day., Disp: , Rfl:   •  coenzyme Q10 100 MG capsule, Take 100 mg by mouth Daily., Disp: , Rfl:   •  hydrOXYzine (ATARAX) 10 MG tablet, Take 10 mg by mouth 2 (Two) Times a Day., Disp: , Rfl:   •  levothyroxine (SYNTHROID, LEVOTHROID) 125 MCG tablet, Take 125 mcg by mouth Daily., Disp: , Rfl:   •  MAGNESIUM CITRATE PO, Take 250 mg by mouth Daily., Disp: , Rfl:   •  metoprolol tartrate (LOPRESSOR) 25 MG tablet, Take 1 tablet by mouth 2 (Two) Times a Day., Disp: 60 tablet, Rfl: 11  •  Omega-3 Fatty Acids (FISH OIL) 1000 MG capsule capsule, Take 2,400 mg by mouth Daily With Breakfast., Disp: , Rfl:   •  valsartan-hydrochlorothiazide (DIOVAN-HCT) 160-12.5 MG per tablet, Take 1 tablet by mouth Daily., Disp: , Rfl:   •  Multiple Vitamin (MULTI VITAMIN DAILY PO), Take  by mouth., Disp: , Rfl:   •  raNITIdine (ZANTAC) 150 MG tablet, Take 150 mg by mouth As Needed for Heartburn., Disp: , Rfl:       The following portions of the patient's history were reviewed and updated as appropriate: allergies, current medications, past family history, past medical history, past social history, past surgical history and problem list.    Social History     Tobacco Use   • Smoking status: Former Smoker     Packs/day: 1.00     Types: Cigarettes     Last attempt to quit: 1958     Years since quittin.3   • Smokeless tobacco: Never Used   Substance Use Topics   • Alcohol use: No   • Drug use: No       Review of Systems   Constitution: Negative for weakness and malaise/fatigue.   Cardiovascular: Negative for chest pain, dyspnea on exertion, irregular heartbeat, leg swelling, near-syncope, orthopnea, palpitations, paroxysmal nocturnal dyspnea and syncope.   Respiratory: Negative for cough, shortness of breath  "and wheezing.    Neurological: Negative for dizziness and light-headedness.       Objective   Vitals:    04/22/19 1413   BP: 133/74   BP Location: Right arm   Patient Position: Sitting   Cuff Size: Adult   Pulse: 70   SpO2: 98%   Weight: 104 kg (230 lb)   Height: 175.3 cm (69\")     Body mass index is 33.97 kg/m².        Physical Exam   Constitutional: He is oriented to person, place, and time. He appears well-developed and well-nourished.   HENT:   Head: Normocephalic and atraumatic.   Cardiovascular: Normal rate, regular rhythm and normal heart sounds. Exam reveals no S3 and no S4.   No murmur heard.  Pulmonary/Chest: Effort normal and breath sounds normal. He has no wheezes. He has no rales.   Abdominal: Soft. Bowel sounds are normal.   Musculoskeletal: He exhibits no edema.   Neurological: He is alert and oriented to person, place, and time.   Skin: Skin is warm and dry.   Psychiatric: He has a normal mood and affect. His behavior is normal.             Procedures      Assessment/Plan    Diagnosis Plan   1. Essential hypertension     2. Palpitations     3. Nonsustained supraventricular tachycardia (CMS/HCC)     4. Abnormal nuclear stress test                  Recommendations:    1.  He has again declined cardiac catheterization to further evaluate his abnormal stress test and ventricular tachycardia.    2.  I have recommended a Holter monitor to evaluate his heart rate considering recent variability.  However, he has declined.    3. Continue metoprolol    4. Follow up with Dr. Payan in 2 months and PRN.      Return in about 2 months (around 6/22/2019) for Recheck.    As always, I appreciate very much the opportunity to participate in the cardiovascular care of your patients.      With Best Regards,    LORENZO Moses            "

## 2019-06-18 ENCOUNTER — OFFICE VISIT (OUTPATIENT)
Dept: CARDIOLOGY | Facility: CLINIC | Age: 79
End: 2019-06-18

## 2019-06-18 VITALS
DIASTOLIC BLOOD PRESSURE: 74 MMHG | HEART RATE: 65 BPM | BODY MASS INDEX: 34.66 KG/M2 | SYSTOLIC BLOOD PRESSURE: 145 MMHG | WEIGHT: 234 LBS | HEIGHT: 69 IN | RESPIRATION RATE: 16 BRPM

## 2019-06-18 DIAGNOSIS — R00.2 PALPITATIONS: ICD-10-CM

## 2019-06-18 DIAGNOSIS — I47.29 NONSUSTAINED VENTRICULAR TACHYCARDIA (HCC): Primary | ICD-10-CM

## 2019-06-18 DIAGNOSIS — R94.39 ABNORMAL NUCLEAR STRESS TEST: ICD-10-CM

## 2019-06-18 DIAGNOSIS — I47.1 NONSUSTAINED SUPRAVENTRICULAR TACHYCARDIA (HCC): ICD-10-CM

## 2019-06-18 PROCEDURE — 99214 OFFICE O/P EST MOD 30 MIN: CPT | Performed by: INTERNAL MEDICINE

## 2019-06-18 RX ORDER — CARVEDILOL 12.5 MG/1
12.5 TABLET ORAL 2 TIMES DAILY
Qty: 60 TABLET | Refills: 5 | Status: SHIPPED | OUTPATIENT
Start: 2019-06-18 | End: 2019-07-10 | Stop reason: ALTCHOICE

## 2019-06-19 ENCOUNTER — TELEPHONE (OUTPATIENT)
Dept: CARDIOLOGY | Facility: CLINIC | Age: 79
End: 2019-06-19

## 2019-06-20 ENCOUNTER — HOSPITAL ENCOUNTER (OUTPATIENT)
Dept: RESPIRATORY THERAPY | Facility: HOSPITAL | Age: 79
Discharge: HOME | End: 2019-06-20
Admitting: INTERNAL MEDICINE

## 2019-06-20 DIAGNOSIS — R00.2 PALPITATIONS: ICD-10-CM

## 2019-06-20 PROCEDURE — 93226 XTRNL ECG REC<48 HR SCAN A/R: CPT

## 2019-06-20 PROCEDURE — 93225 XTRNL ECG REC<48 HRS REC: CPT

## 2019-06-25 PROCEDURE — 93227 XTRNL ECG REC<48 HR R&I: CPT | Performed by: INTERNAL MEDICINE

## 2019-07-01 ENCOUNTER — TELEPHONE (OUTPATIENT)
Dept: CARDIOLOGY | Facility: CLINIC | Age: 79
End: 2019-07-01

## 2019-07-01 NOTE — TELEPHONE ENCOUNTER
Can we confirm what medications he is taking? It appears he was here last month and there are some changes.

## 2019-07-01 NOTE — TELEPHONE ENCOUNTER
Pt called and said his swelling is a lot better ,but he is worried about his bp 160/87 154/61 141/85

## 2019-07-01 NOTE — TELEPHONE ENCOUNTER
Patient is taking medication that are listed in chart only new thing is Ativan 0.5. After speaking with him he explained that he taking his BP at least 10-15 times a day which gives his very bad anxiety.   I spoke with Louann she advised me to have him take his BP BId and call us back in a week with the reading. Patient expressed verbal undestanding and was agreeable to do so I also expressed to him no do to this when he was upset.

## 2019-07-08 ENCOUNTER — TELEPHONE (OUTPATIENT)
Dept: CARDIOLOGY | Facility: CLINIC | Age: 79
End: 2019-07-08

## 2019-07-10 ENCOUNTER — OFFICE VISIT (OUTPATIENT)
Dept: CARDIOLOGY | Facility: CLINIC | Age: 79
End: 2019-07-10

## 2019-07-10 VITALS
HEART RATE: 61 BPM | BODY MASS INDEX: 34.21 KG/M2 | SYSTOLIC BLOOD PRESSURE: 164 MMHG | OXYGEN SATURATION: 98 % | DIASTOLIC BLOOD PRESSURE: 78 MMHG | HEIGHT: 69 IN | WEIGHT: 231 LBS

## 2019-07-10 DIAGNOSIS — I47.29 NONSUSTAINED VENTRICULAR TACHYCARDIA (HCC): ICD-10-CM

## 2019-07-10 DIAGNOSIS — I47.1 NONSUSTAINED SUPRAVENTRICULAR TACHYCARDIA (HCC): Primary | ICD-10-CM

## 2019-07-10 DIAGNOSIS — I10 ESSENTIAL HYPERTENSION: ICD-10-CM

## 2019-07-10 PROCEDURE — 99213 OFFICE O/P EST LOW 20 MIN: CPT | Performed by: INTERNAL MEDICINE

## 2019-07-10 PROCEDURE — 93000 ELECTROCARDIOGRAM COMPLETE: CPT | Performed by: INTERNAL MEDICINE

## 2019-07-10 RX ORDER — METOPROLOL SUCCINATE 50 MG/1
50 TABLET, EXTENDED RELEASE ORAL DAILY
Qty: 30 TABLET | Refills: 11 | Status: SHIPPED | OUTPATIENT
Start: 2019-07-10 | End: 2019-09-10

## 2019-07-10 RX ORDER — HYDROCHLOROTHIAZIDE 12.5 MG/1
12.5 TABLET ORAL AS NEEDED
COMMUNITY
End: 2019-12-11

## 2019-07-10 NOTE — PROGRESS NOTES
Ángela Bonilla, APRN  Edward Rodas  1940  07/10/2019    Patient Active Problem List   Diagnosis   • Essential hypertension   • Palpitations   • Near syncope   • Nonsustained supraventricular tachycardia (CMS/HCC)   • Nonsustained ventricular tachycardia (CMS/HCC)   • Abnormal nuclear stress test       Ángela Santana, APRN:    Subjective     Edward Rodas is a 79 y.o. male with the problems as listed above, presents    Chief Complaint   Patient presents with   • Hypertension   • Nonsustained ventricular tachycardia       History of Present Illness: Mr. Rodas is a pleasant 79-year-old  male with history of nonsustained ventricle tachycardia and hypertension, is here for regular cardiology follow-up.  He states that his blood pressure has been running high at home around 160 on the top.  He also states that he is some increased stress lately and has been following with his PCP and is being treated with Atarax.  He is currently on carvedilol 12.5 mg p.o. twice daily and valsartan HCTZ 160/12.5 mg p.o. daily for his hypertension.  He was previously on amlodipine 10 mg daily which caused swelling of his legs and had to be discontinued with improvement in his leg swelling.  He says he has noticed some increasing shortness of breath since he has started taking carvedilol.      Allergies   Allergen Reactions   • Diprivan [Propofol] Angioedema   • Shellfish-Derived Products Hives and Itching   • Statins Other (See Comments)     Aches and confusion   • Sulfa Antibiotics Unknown (See Comments)     States lowers blood sugar   :      Current Outpatient Medications:   •  albuterol (PROVENTIL) (2.5 MG/3ML) 0.083% nebulizer solution, Take 2.5 mg by nebulization Every 4 (Four) Hours As Needed for Wheezing., Disp: , Rfl:   •  aspirin 81 MG EC tablet, Take 81 mg by mouth 3 (Three) Times a Week., Disp: , Rfl:   •  budesonide-formoterol (SYMBICORT) 160-4.5 MCG/ACT inhaler, Inhale 2 puffs 2 (Two) Times a Day., Disp:  , Rfl:   •  coenzyme Q10 100 MG capsule, Take 100 mg by mouth Daily., Disp: , Rfl:   •  hydrochlorothiazide (HYDRODIURIL) 12.5 MG tablet, Take 12.5 mg by mouth Every Other Day., Disp: , Rfl:   •  levothyroxine (SYNTHROID, LEVOTHROID) 125 MCG tablet, Take 125 mcg by mouth Daily., Disp: , Rfl:   •  MAGNESIUM CITRATE PO, Take 250 mg by mouth Daily., Disp: , Rfl:   •  Omega-3 Fatty Acids (FISH OIL) 1000 MG capsule capsule, Take 2,400 mg by mouth Daily With Breakfast., Disp: , Rfl:   •  valsartan-hydrochlorothiazide (DIOVAN-HCT) 160-12.5 MG per tablet, Take 1 tablet by mouth Daily., Disp: , Rfl:   •  hydrOXYzine (ATARAX) 10 MG tablet, Take 10 mg by mouth 2 (Two) Times a Day., Disp: , Rfl:   •  metoprolol succinate XL (TOPROL-XL) 50 MG 24 hr tablet, Take 1 tablet by mouth Daily., Disp: 30 tablet, Rfl: 11  •  Multiple Vitamin (MULTI VITAMIN DAILY PO), Take  by mouth., Disp: , Rfl:   •  raNITIdine (ZANTAC) 150 MG tablet, Take 150 mg by mouth As Needed for Heartburn., Disp: , Rfl:       The following portions of the patient's history were reviewed and updated as appropriate: allergies, current medications, past family history, past medical history, past social history, past surgical history and problem list.    Social History     Tobacco Use   • Smoking status: Former Smoker     Packs/day: 1.00     Types: Cigarettes     Last attempt to quit: 1958     Years since quittin.5   • Smokeless tobacco: Never Used   Substance Use Topics   • Alcohol use: No   • Drug use: No       Review of Systems   Constitution: Negative for chills and fever.   HENT: Negative for nosebleeds and sore throat.    Cardiovascular: Positive for leg swelling (Improved.). Negative for chest pain, near-syncope, palpitations and syncope.   Respiratory: Positive for shortness of breath. Negative for cough, hemoptysis and wheezing.    Gastrointestinal: Negative for abdominal pain, hematemesis, hematochezia, melena, nausea and vomiting.   Genitourinary:  "Negative for dysuria and hematuria.   Neurological: Positive for dizziness. Negative for headaches.       Objective   Vitals:    07/10/19 1355   BP: 164/78   BP Location: Right arm   Pulse: 61   SpO2: 98%   Weight: 105 kg (231 lb)   Height: 175.3 cm (69.02\")     Body mass index is 34.1 kg/m².      Physical Exam   Constitutional: He is oriented to person, place, and time. He appears well-developed and well-nourished.   HENT:   Mouth/Throat: Oropharynx is clear and moist.   Eyes: EOM are normal. Pupils are equal, round, and reactive to light.   Neck: Neck supple. No JVD present. No tracheal deviation present. No thyromegaly present.   Cardiovascular: Normal rate, regular rhythm, S1 normal and S2 normal. Exam reveals no gallop and no friction rub.   No murmur heard.  Pulmonary/Chest: Effort normal and breath sounds normal.   Abdominal: Soft. Bowel sounds are normal. He exhibits no mass. There is no tenderness.   Musculoskeletal: Normal range of motion. He exhibits no edema.   Lymphadenopathy:     He has no cervical adenopathy.   Neurological: He is alert and oriented to person, place, and time.   Skin: Skin is warm and dry. No rash noted.   Psychiatric: He has a normal mood and affect.       Lab Results   Component Value Date     08/08/2018    K 3.5 08/08/2018     08/08/2018    CO2 27.0 08/08/2018    BUN 16 08/08/2018    CREATININE 1.20 03/19/2019    GLUCOSE 104 (H) 08/08/2018    CALCIUM 9.0 08/08/2018    AST 16 08/07/2018    ALT 23 08/07/2018    ALKPHOS 97 08/07/2018     No results found for: CKTOTAL  Lab Results   Component Value Date    WBC 15.74 (H) 08/08/2018    HGB 14.5 08/08/2018    HCT 43.6 08/08/2018     08/08/2018     Lab Results   Component Value Date    INR 0.99 12/04/2017    INR 0.98 07/10/2017     Lab Results   Component Value Date    MG 2.2 08/08/2018     Lab Results   Component Value Date    TSH 2.760 08/08/2018    TRIG 112 08/08/2018    HDL 34 (L) 08/08/2018     (H) 08/08/2018 "          ECG 12 Lead  Date/Time: 7/10/2019 2:00 PM  Performed by: Jae Payan MD  Authorized by: Jae Payan MD   Comparison: compared with previous ECG from 1/22/2019  Similar to previous ECG  Rhythm: sinus rhythm  BPM: 60  Conduction: conduction normal  ST Segments: ST segments normal  T Waves: T waves normal                Assessment/Plan :   Diagnosis Plan   1. Nonsustained supraventricular tachycardia.    2. Nonsustained ventricular tachycardia.    3. Essential hypertension          Recommendations:  1. Since he is having some shortness of breath with carvedilol, will change to Toprol-XL 50 mg daily.  2. Add back amlodipine at a lower dose of 5 mg daily.  3. Continue with valsartan HCTZ.    Return in about 5 months (around 12/10/2019).    As always, Nágela   I appreciate very much the opportunity to participate in the cardiovascular care of your patients. Please do not hesitate to call me with any questions with regards to Edward Rodas evaluation and management.         With Best Regards,        Jae Payan MD, [facility]    Dragon disclaimer:  Much of this encounter note is an electronic transcription/translation of spoken language to printed text. The electronic translation of spoken language may permit erroneous, or at times, nonsensical words or phrases to be inadvertently transcribed; Although I have reviewed the note for such errors, some may still exist.

## 2019-07-20 ENCOUNTER — HOSPITAL ENCOUNTER (EMERGENCY)
Facility: HOSPITAL | Age: 79
Discharge: HOME | End: 2019-07-20
Attending: EMERGENCY MEDICINE | Admitting: EMERGENCY MEDICINE

## 2019-07-20 VITALS
OXYGEN SATURATION: 94 % | SYSTOLIC BLOOD PRESSURE: 130 MMHG | RESPIRATION RATE: 20 BRPM | DIASTOLIC BLOOD PRESSURE: 90 MMHG | WEIGHT: 231 LBS | HEIGHT: 69 IN | HEART RATE: 64 BPM | BODY MASS INDEX: 34.21 KG/M2 | TEMPERATURE: 97.7 F

## 2019-07-20 DIAGNOSIS — R09.89 LABILE BLOOD PRESSURE: ICD-10-CM

## 2019-07-20 DIAGNOSIS — F41.9 ANXIETY: Primary | ICD-10-CM

## 2019-07-20 LAB
APTT PPP: 28.4 SECONDS (ref 23.8–36.1)
BASOPHILS # BLD AUTO: 0.02 10*3/MM3 (ref 0–0.2)
BASOPHILS NFR BLD AUTO: 0.2 % (ref 0–1.5)
BILIRUB UR QL STRIP: NEGATIVE
CLARITY UR: CLEAR
COLOR UR: YELLOW
DEPRECATED RDW RBC AUTO: 48 FL (ref 37–54)
EOSINOPHIL # BLD AUTO: 0.28 10*3/MM3 (ref 0–0.4)
EOSINOPHIL NFR BLD AUTO: 2.1 % (ref 0.3–6.2)
ERYTHROCYTE [DISTWIDTH] IN BLOOD BY AUTOMATED COUNT: 14.7 % (ref 12.3–15.4)
GLUCOSE UR STRIP-MCNC: NEGATIVE MG/DL
HCT VFR BLD AUTO: 48.4 % (ref 37.5–51)
HGB BLD-MCNC: 16.2 G/DL (ref 13–17.7)
HGB UR QL STRIP.AUTO: NEGATIVE
HOLD SPECIMEN: NORMAL
HOLD SPECIMEN: NORMAL
IMM GRANULOCYTES # BLD AUTO: 0.03 10*3/MM3 (ref 0–0.05)
IMM GRANULOCYTES NFR BLD AUTO: 0.2 % (ref 0–0.5)
INR PPP: 0.94 (ref 0.9–1.1)
KETONES UR QL STRIP: NEGATIVE
LEUKOCYTE ESTERASE UR QL STRIP.AUTO: NEGATIVE
LYMPHOCYTES # BLD AUTO: 3.85 10*3/MM3 (ref 0.7–3.1)
LYMPHOCYTES NFR BLD AUTO: 28.9 % (ref 19.6–45.3)
MAGNESIUM SERPL-MCNC: 2.2 MG/DL (ref 1.6–2.4)
MCH RBC QN AUTO: 30.6 PG (ref 26.6–33)
MCHC RBC AUTO-ENTMCNC: 33.5 G/DL (ref 31.5–35.7)
MCV RBC AUTO: 91.5 FL (ref 79–97)
MONOCYTES # BLD AUTO: 0.82 10*3/MM3 (ref 0.1–0.9)
MONOCYTES NFR BLD AUTO: 6.2 % (ref 5–12)
NEUTROPHILS # BLD AUTO: 8.3 10*3/MM3 (ref 1.7–7)
NEUTROPHILS NFR BLD AUTO: 62.4 % (ref 42.7–76)
NITRITE UR QL STRIP: NEGATIVE
PH UR STRIP.AUTO: 7 [PH] (ref 5–8)
PLATELET # BLD AUTO: 245 10*3/MM3 (ref 140–450)
PMV BLD AUTO: 10.3 FL (ref 6–12)
PROT UR QL STRIP: NEGATIVE
PROTHROMBIN TIME: 13.1 SECONDS (ref 11–15.4)
RBC # BLD AUTO: 5.29 10*6/MM3 (ref 4.14–5.8)
SP GR UR STRIP: 1.01 (ref 1–1.03)
T4 FREE SERPL-MCNC: 1.25 NG/DL (ref 0.93–1.7)
TROPONIN T SERPL-MCNC: <0.01 NG/ML (ref 0–0.03)
TSH SERPL DL<=0.05 MIU/L-ACNC: 0.4 MIU/ML (ref 0.27–4.2)
UROBILINOGEN UR QL STRIP: NORMAL
WBC NRBC COR # BLD: 13.3 10*3/MM3 (ref 3.4–10.8)
WHOLE BLOOD HOLD SPECIMEN: NORMAL
WHOLE BLOOD HOLD SPECIMEN: NORMAL

## 2019-07-20 PROCEDURE — 93005 ELECTROCARDIOGRAM TRACING: CPT | Performed by: EMERGENCY MEDICINE

## 2019-07-20 PROCEDURE — 84443 ASSAY THYROID STIM HORMONE: CPT | Performed by: EMERGENCY MEDICINE

## 2019-07-20 PROCEDURE — 84439 ASSAY OF FREE THYROXINE: CPT | Performed by: EMERGENCY MEDICINE

## 2019-07-20 PROCEDURE — 99284 EMERGENCY DEPT VISIT MOD MDM: CPT

## 2019-07-20 PROCEDURE — 85730 THROMBOPLASTIN TIME PARTIAL: CPT | Performed by: EMERGENCY MEDICINE

## 2019-07-20 PROCEDURE — 81003 URINALYSIS AUTO W/O SCOPE: CPT | Performed by: EMERGENCY MEDICINE

## 2019-07-20 PROCEDURE — 83735 ASSAY OF MAGNESIUM: CPT | Performed by: EMERGENCY MEDICINE

## 2019-07-20 PROCEDURE — 93010 ELECTROCARDIOGRAM REPORT: CPT | Performed by: INTERNAL MEDICINE

## 2019-07-20 PROCEDURE — 84484 ASSAY OF TROPONIN QUANT: CPT | Performed by: EMERGENCY MEDICINE

## 2019-07-20 PROCEDURE — 85025 COMPLETE CBC W/AUTO DIFF WBC: CPT | Performed by: EMERGENCY MEDICINE

## 2019-07-20 PROCEDURE — 85610 PROTHROMBIN TIME: CPT | Performed by: EMERGENCY MEDICINE

## 2019-07-20 RX ORDER — DIAZEPAM 5 MG/1
5 TABLET ORAL ONCE
Status: COMPLETED | OUTPATIENT
Start: 2019-07-20 | End: 2019-07-20

## 2019-07-20 RX ORDER — HYDROXYZINE PAMOATE 50 MG/1
50 CAPSULE ORAL 3 TIMES DAILY PRN
Qty: 21 CAPSULE | Refills: 0 | Status: SHIPPED | OUTPATIENT
Start: 2019-07-20 | End: 2020-07-17 | Stop reason: DRUGHIGH

## 2019-07-20 RX ORDER — SODIUM CHLORIDE 0.9 % (FLUSH) 0.9 %
10 SYRINGE (ML) INJECTION AS NEEDED
Status: DISCONTINUED | OUTPATIENT
Start: 2019-07-20 | End: 2019-07-21 | Stop reason: HOSPADM

## 2019-07-20 RX ADMIN — DIAZEPAM 5 MG: 5 TABLET ORAL at 20:52

## 2019-09-10 ENCOUNTER — OFFICE VISIT (OUTPATIENT)
Dept: CARDIOLOGY | Facility: CLINIC | Age: 79
End: 2019-09-10

## 2019-09-10 VITALS
HEART RATE: 67 BPM | WEIGHT: 228 LBS | SYSTOLIC BLOOD PRESSURE: 143 MMHG | BODY MASS INDEX: 33.77 KG/M2 | RESPIRATION RATE: 16 BRPM | DIASTOLIC BLOOD PRESSURE: 81 MMHG | HEIGHT: 69 IN

## 2019-09-10 DIAGNOSIS — I10 ESSENTIAL HYPERTENSION: ICD-10-CM

## 2019-09-10 DIAGNOSIS — R94.39 ABNORMAL NUCLEAR STRESS TEST: ICD-10-CM

## 2019-09-10 DIAGNOSIS — R00.2 PALPITATIONS: Primary | ICD-10-CM

## 2019-09-10 DIAGNOSIS — I47.1 NONSUSTAINED SUPRAVENTRICULAR TACHYCARDIA (HCC): ICD-10-CM

## 2019-09-10 PROCEDURE — 99213 OFFICE O/P EST LOW 20 MIN: CPT | Performed by: INTERNAL MEDICINE

## 2019-09-10 RX ORDER — AMLODIPINE BESYLATE 5 MG/1
10 TABLET ORAL DAILY
COMMUNITY
End: 2021-03-17 | Stop reason: SDUPTHER

## 2019-09-10 RX ORDER — TRIAMCINOLONE ACETONIDE 0.25 MG/G
CREAM TOPICAL 2 TIMES DAILY
COMMUNITY
End: 2020-07-17 | Stop reason: DRUGHIGH

## 2019-09-10 RX ORDER — METOPROLOL SUCCINATE 50 MG/1
75 TABLET, EXTENDED RELEASE ORAL DAILY
Qty: 45 TABLET | Refills: 5 | Status: SHIPPED | OUTPATIENT
Start: 2019-09-10 | End: 2019-12-11 | Stop reason: SDUPTHER

## 2019-09-10 RX ORDER — NYSTATIN 100000 U/G
CREAM TOPICAL 2 TIMES DAILY
COMMUNITY
End: 2020-07-17 | Stop reason: ALTCHOICE

## 2019-09-10 NOTE — PROGRESS NOTES
Ángela Bonilla, APRN  Edward Rodas  1940  09/10/2019    Patient Active Problem List   Diagnosis   • Essential hypertension   • Palpitations   • Near syncope   • Nonsustained supraventricular tachycardia (CMS/HCC)   • Nonsustained ventricular tachycardia (CMS/HCC)   • Abnormal nuclear stress test       DeaÁngela Petersen, APRN:    Subjective     Edward Rodas is a 79 y.o. male with the problems as listed above, presents    Chief complaint: Follow-up of history of ventricular arrhythmias and hypertension    History of Present Illness: Mr. Rodas is a pleasant 79-year-old gentleman with history of ventricular arrhythmias in the form of nonsustained ventricle tachycardia and history of hypertension and a mildly abnormal nuclear stress test.  On today's visit he says he has some dizziness especially when he gets up from sitting position.  He also has some intermittent palpitations.  His recent Holter monitor revealed no significant cardiac arrhythmias even when he mentioned that he felt like his heart was racing.  He was recently placed on Toprol-XL 50 mg daily which seems to be helping.He seems to be tolerating this well.      Allergies   Allergen Reactions   • Diprivan [Propofol] Angioedema   • Shellfish-Derived Products Hives and Itching   • Statins Other (See Comments)     Aches and confusion   • Sulfa Antibiotics Unknown (See Comments)     States lowers blood sugar   :      Current Outpatient Medications:   •  albuterol (PROVENTIL) (2.5 MG/3ML) 0.083% nebulizer solution, Take 2.5 mg by nebulization Every 4 (Four) Hours As Needed for Wheezing., Disp: , Rfl:   •  amLODIPine (NORVASC) 5 MG tablet, Take 5 mg by mouth Daily., Disp: , Rfl:   •  aspirin 81 MG EC tablet, Take 81 mg by mouth 3 (Three) Times a Week., Disp: , Rfl:   •  budesonide-formoterol (SYMBICORT) 160-4.5 MCG/ACT inhaler, Inhale 2 puffs 2 (Two) Times a Day., Disp: , Rfl:   •  coenzyme Q10 100 MG capsule, Take 100 mg by mouth Daily., Disp: , Rfl:    •  hydrochlorothiazide (HYDRODIURIL) 12.5 MG tablet, Take 12.5 mg by mouth As Needed., Disp: , Rfl:   •  hydrOXYzine (ATARAX) 10 MG tablet, Take 10 mg by mouth 2 (Two) Times a Day., Disp: , Rfl:   •  hydrOXYzine pamoate (VISTARIL) 50 MG capsule, Take 1 capsule by mouth 3 (Three) Times a Day As Needed for Anxiety., Disp: 21 capsule, Rfl: 0  •  levothyroxine (SYNTHROID, LEVOTHROID) 125 MCG tablet, Take 125 mcg by mouth Daily., Disp: , Rfl:   •  MAGNESIUM CITRATE PO, Take 250 mg by mouth Daily., Disp: , Rfl:   •  metoprolol succinate XL (TOPROL-XL) 50 MG 24 hr tablet, Take 1.5 tablets by mouth Daily., Disp: 45 tablet, Rfl: 5  •  nystatin (MYCOSTATIN) 938629 UNIT/GM cream, Apply  topically to the appropriate area as directed 2 (Two) Times a Day., Disp: , Rfl:   •  Omega-3 Fatty Acids (FISH OIL) 1000 MG capsule capsule, Take 2,400 mg by mouth Daily With Breakfast., Disp: , Rfl:   •  triamcinolone (KENALOG) 0.025 % cream, Apply  topically to the appropriate area as directed 2 (Two) Times a Day., Disp: , Rfl:   •  valsartan-hydrochlorothiazide (DIOVAN-HCT) 160-12.5 MG per tablet, Take 1 tablet by mouth Daily., Disp: , Rfl:   •  Multiple Vitamin (MULTI VITAMIN DAILY PO), Take  by mouth., Disp: , Rfl:   •  raNITIdine (ZANTAC) 150 MG tablet, Take 150 mg by mouth As Needed for Heartburn., Disp: , Rfl:       The following portions of the patient's history were reviewed and updated as appropriate: allergies, current medications, past family history, past medical history, past social history, past surgical history and problem list.    Social History     Tobacco Use   • Smoking status: Former Smoker     Packs/day: 1.00     Types: Cigarettes     Last attempt to quit:      Years since quittin.7   • Smokeless tobacco: Never Used   Substance Use Topics   • Alcohol use: No   • Drug use: No       Review of Systems   Cardiovascular: Positive for leg swelling and palpitations. Negative for chest pain and syncope.   Respiratory:  "Positive for shortness of breath.    Neurological: Positive for dizziness.       Objective   Vitals:    09/10/19 1427   BP: 143/81   BP Location: Right arm   Pulse: 67   Resp: 16   Weight: 103 kg (228 lb)   Height: 175.3 cm (69.02\")     Body mass index is 33.65 kg/m².        Physical Exam   Constitutional: He is oriented to person, place, and time. He appears well-developed and well-nourished.   HENT:   Mouth/Throat: Oropharynx is clear and moist.   Eyes: EOM are normal. Pupils are equal, round, and reactive to light.   Neck: Neck supple. No JVD present. No tracheal deviation present. No thyromegaly present.   Cardiovascular: Normal rate, regular rhythm, S1 normal and S2 normal. Exam reveals no gallop and no friction rub.   No murmur heard.  Pulmonary/Chest: Effort normal and breath sounds normal.   Abdominal: Soft. Bowel sounds are normal. He exhibits no mass. There is no tenderness.   Musculoskeletal: Normal range of motion. He exhibits no edema.   Lymphadenopathy:     He has no cervical adenopathy.   Neurological: He is alert and oriented to person, place, and time.   Skin: Skin is warm and dry. No rash noted.   Psychiatric: He has a normal mood and affect.       Lab Results   Component Value Date     08/08/2018    K 3.5 08/08/2018     08/08/2018    CO2 27.0 08/08/2018    BUN 16 08/08/2018    CREATININE 1.20 03/19/2019    GLUCOSE 104 (H) 08/08/2018    CALCIUM 9.0 08/08/2018    AST 16 08/07/2018    ALT 23 08/07/2018    ALKPHOS 97 08/07/2018     No results found for: CKTOTAL  Lab Results   Component Value Date    WBC 13.30 (H) 07/20/2019    HGB 16.2 07/20/2019    HCT 48.4 07/20/2019     07/20/2019     Lab Results   Component Value Date    INR 0.94 07/20/2019    INR 0.99 12/04/2017    INR 0.98 07/10/2017     Lab Results   Component Value Date    MG 2.2 07/20/2019     Lab Results   Component Value Date    TSH 0.401 07/20/2019    TRIG 112 08/08/2018    HDL 34 (L) 08/08/2018     (H) 08/08/2018 "      Procedures    Assessment/Plan       Diagnosis Plan   1. Palpitations  Comprehensive Metabolic Panel,magnesium   2. Nonsustained supraventricular tachycardia.    3. Abnormal nuclear stress test     4. Essential hypertension          Recommendations:  1. Discontinue the HCTZ to tendency for hypokalemia that could potentiate the arrhythmias.  2. Continue with Toprol-XL 50 mg daily.  Increase it to 75 mg (1-1/2 tablets daily)  3. Check BMP and a magnesium level    Return in about 3 months (around 12/10/2019).    As always, I appreciate very much the opportunity to participate in the cardiovascular care of your patients.      With Best Regards,    Jae Payan MD, FACC    Dragon disclaimer:  Much of this encounter note is an electronic transcription/translation of spoken language to printed text. The electronic translation of spoken language may permit erroneous, or at times, nonsensical words or phrases to be inadvertently transcribed; Although I have reviewed the note for such errors, some may still exist.

## 2019-09-11 ENCOUNTER — LAB (OUTPATIENT)
Dept: LAB | Facility: HOSPITAL | Age: 79
End: 2019-09-11

## 2019-09-11 DIAGNOSIS — R00.2 PALPITATIONS: ICD-10-CM

## 2019-09-11 LAB
ALBUMIN SERPL-MCNC: 3.9 G/DL (ref 3.5–5.2)
ALBUMIN/GLOB SERPL: 1.3 G/DL
ALP SERPL-CCNC: 83 U/L (ref 39–117)
ALT SERPL W P-5'-P-CCNC: 14 U/L (ref 1–41)
ANION GAP SERPL CALCULATED.3IONS-SCNC: 12.7 MMOL/L (ref 5–15)
AST SERPL-CCNC: 13 U/L (ref 1–40)
BILIRUB SERPL-MCNC: 0.5 MG/DL (ref 0.2–1.2)
BUN BLD-MCNC: 13 MG/DL (ref 8–23)
BUN/CREAT SERPL: 11.2 (ref 7–25)
CALCIUM SPEC-SCNC: 9.1 MG/DL (ref 8.6–10.5)
CHLORIDE SERPL-SCNC: 103 MMOL/L (ref 98–107)
CO2 SERPL-SCNC: 24.3 MMOL/L (ref 22–29)
CREAT BLD-MCNC: 1.16 MG/DL (ref 0.76–1.27)
GFR SERPL CREATININE-BSD FRML MDRD: 60 ML/MIN/1.73
GLOBULIN UR ELPH-MCNC: 3.1 GM/DL
GLUCOSE BLD-MCNC: 113 MG/DL (ref 65–99)
MAGNESIUM SERPL-MCNC: 2.3 MG/DL (ref 1.6–2.4)
POTASSIUM BLD-SCNC: 3.5 MMOL/L (ref 3.5–5.2)
PROT SERPL-MCNC: 7 G/DL (ref 6–8.5)
SODIUM BLD-SCNC: 140 MMOL/L (ref 136–145)

## 2019-09-11 PROCEDURE — 36415 COLL VENOUS BLD VENIPUNCTURE: CPT

## 2019-09-11 PROCEDURE — 80053 COMPREHEN METABOLIC PANEL: CPT

## 2019-09-11 PROCEDURE — 83735 ASSAY OF MAGNESIUM: CPT

## 2019-12-11 ENCOUNTER — OFFICE VISIT (OUTPATIENT)
Dept: CARDIOLOGY | Facility: CLINIC | Age: 79
End: 2019-12-11

## 2019-12-11 VITALS
WEIGHT: 232.6 LBS | HEART RATE: 63 BPM | OXYGEN SATURATION: 96 % | RESPIRATION RATE: 12 BRPM | SYSTOLIC BLOOD PRESSURE: 152 MMHG | HEIGHT: 69 IN | DIASTOLIC BLOOD PRESSURE: 79 MMHG | BODY MASS INDEX: 34.45 KG/M2

## 2019-12-11 DIAGNOSIS — R94.39 ABNORMAL NUCLEAR STRESS TEST: ICD-10-CM

## 2019-12-11 DIAGNOSIS — R00.2 PALPITATIONS: Primary | ICD-10-CM

## 2019-12-11 DIAGNOSIS — I47.29 NONSUSTAINED VENTRICULAR TACHYCARDIA (HCC): ICD-10-CM

## 2019-12-11 PROCEDURE — 99213 OFFICE O/P EST LOW 20 MIN: CPT | Performed by: INTERNAL MEDICINE

## 2019-12-11 RX ORDER — METOPROLOL SUCCINATE 50 MG/1
75 TABLET, EXTENDED RELEASE ORAL DAILY
Qty: 45 TABLET | Refills: 5 | Status: SHIPPED | OUTPATIENT
Start: 2019-12-11 | End: 2020-06-09 | Stop reason: SDUPTHER

## 2019-12-11 RX ORDER — LEVOTHYROXINE SODIUM 0.1 MG/1
100 TABLET ORAL DAILY
Refills: 5 | COMMUNITY
Start: 2019-10-16 | End: 2020-07-17 | Stop reason: DRUGHIGH

## 2019-12-11 NOTE — PROGRESS NOTES
Ágnela Bonilla, APRN  Edward Rodas  1940 12/11/2019    Patient Active Problem List   Diagnosis   • Essential hypertension   • Palpitations   • Near syncope   • Nonsustained supraventricular tachycardia (CMS/HCC)   • Nonsustained ventricular tachycardia (CMS/HCC)   • Abnormal nuclear stress test       Ángela Santana, APRN:    Subjective     Edward Rodas is a 79 y.o. male with the problems as listed above, presents    Chief Complaint   Patient presents with   • Palpitations       History of Present Illness: Mr. Rodas is a 79-year-old  male with history of ventricular arrhythmias in the form of nonsustained medical tachycardia and mildly abnormal nuclear stress test.  He is here for regular cardiology follow-up.  On today's visit he says his palpitations are better.  He denies any dizziness or syncope.  He has been on Toprol-XL which he says is helping.    Allergies   Allergen Reactions   • Sulfa Antibiotics Unknown (See Comments)     States lowers blood sugar   • Diprivan [Propofol] Angioedema   • Statins Other (See Comments)     Aches and confusion   • Shellfish-Derived Products Hives and Itching   :      Current Outpatient Medications:   •  albuterol (PROVENTIL) (2.5 MG/3ML) 0.083% nebulizer solution, Take 2.5 mg by nebulization Every 4 (Four) Hours As Needed for Wheezing., Disp: , Rfl:   •  amLODIPine (NORVASC) 5 MG tablet, Take 5 mg by mouth Daily., Disp: , Rfl:   •  aspirin 81 MG EC tablet, Take 81 mg by mouth Daily., Disp: , Rfl:   •  budesonide-formoterol (SYMBICORT) 160-4.5 MCG/ACT inhaler, Inhale 2 puffs 2 (Two) Times a Day., Disp: , Rfl:   •  hydrOXYzine (ATARAX) 10 MG tablet, Take 10 mg by mouth As Needed for Itching., Disp: , Rfl:   •  hydrOXYzine pamoate (VISTARIL) 50 MG capsule, Take 1 capsule by mouth 3 (Three) Times a Day As Needed for Anxiety. (Patient taking differently: Take 25 mg by mouth As Needed for Anxiety.), Disp: 21 capsule, Rfl: 0  •  levothyroxine (SYNTHROID,  LEVOTHROID) 100 MCG tablet, Take 100 mcg by mouth Daily., Disp: , Rfl: 5  •  MAGNESIUM CITRATE PO, Take 250 mg by mouth Daily., Disp: , Rfl:   •  metoprolol succinate XL (TOPROL-XL) 50 MG 24 hr tablet, Take 1.5 tablets by mouth Daily., Disp: 45 tablet, Rfl: 5  •  nystatin (MYCOSTATIN) 404423 UNIT/GM cream, Apply  topically to the appropriate area as directed 2 (Two) Times a Day., Disp: , Rfl:   •  Omega-3 Fatty Acids (FISH OIL) 1000 MG capsule capsule, Take 2,400 mg by mouth Daily With Breakfast., Disp: , Rfl:   •  triamcinolone (KENALOG) 0.025 % cream, Apply  topically to the appropriate area as directed 2 (Two) Times a Day., Disp: , Rfl:   •  valsartan-hydrochlorothiazide (DIOVAN-HCT) 160-12.5 MG per tablet, Take 1 tablet by mouth Daily., Disp: , Rfl:       The following portions of the patient's history were reviewed and updated as appropriate: allergies, current medications, past family history, past medical history, past social history, past surgical history and problem list.    Social History     Tobacco Use   • Smoking status: Former Smoker     Packs/day: 1.00     Types: Cigarettes     Last attempt to quit: 1958     Years since quittin.9   • Smokeless tobacco: Never Used   Substance Use Topics   • Alcohol use: No   • Drug use: No       Review of Systems   Constitution: Positive for malaise/fatigue.        Reports fatigue has improved   HENT: Negative.  Negative for congestion.    Eyes: Negative.  Negative for blurred vision and double vision.   Cardiovascular: Positive for irregular heartbeat. Negative for chest pain, cyanosis, near-syncope and orthopnea.        No changes since last office visit   Respiratory: Negative.  Negative for cough, shortness of breath, sleep disturbances due to breathing and wheezing.    Endocrine: Negative.  Negative for cold intolerance and heat intolerance.   Hematologic/Lymphatic: Negative.  Does not bruise/bleed easily.   Skin: Negative.  Negative for color change, dry  "skin and poor wound healing.   Musculoskeletal: Positive for joint pain. Negative for falls, joint swelling and muscle weakness.   Gastrointestinal: Negative.  Negative for abdominal pain, nausea and vomiting.   Genitourinary: Negative.    Neurological: Negative.  Negative for dizziness, headaches, light-headedness, numbness and tremors.   Psychiatric/Behavioral: The patient is nervous/anxious.         Reports due to wife being ill   Allergic/Immunologic: Positive for environmental allergies.       Objective   Vitals:    12/11/19 1417   BP: 152/79   BP Location: Right arm   Patient Position: Sitting   Cuff Size: Large Adult   Pulse: 63   Resp: 12   SpO2: 96%   Weight: 106 kg (232 lb 9.6 oz)   Height: 175.3 cm (69.02\")     Body mass index is 34.33 kg/m².        Physical Exam   Constitutional: He is oriented to person, place, and time. He appears well-developed and well-nourished.   HENT:   Mouth/Throat: Oropharynx is clear and moist.   Eyes: Pupils are equal, round, and reactive to light. EOM are normal.   Neck: Neck supple. No JVD present. No tracheal deviation present. No thyromegaly present.   Cardiovascular: Normal rate, regular rhythm, S1 normal and S2 normal. Exam reveals no gallop and no friction rub.   No murmur heard.  Pulmonary/Chest: Effort normal and breath sounds normal.   Abdominal: Soft. Bowel sounds are normal. He exhibits no mass. There is no tenderness.   Musculoskeletal: Normal range of motion. He exhibits no edema.   Lymphadenopathy:     He has no cervical adenopathy.   Neurological: He is alert and oriented to person, place, and time.   Skin: Skin is warm and dry. No rash noted.   Psychiatric: He has a normal mood and affect.       Lab Results   Component Value Date     09/11/2019    K 3.5 09/11/2019     09/11/2019    CO2 24.3 09/11/2019    BUN 13 09/11/2019    CREATININE 1.16 09/11/2019    GLUCOSE 113 (H) 09/11/2019    CALCIUM 9.1 09/11/2019    AST 13 09/11/2019    ALT 14 09/11/2019 "    ALKPHOS 83 2019     No results found for: CKTOTAL  Lab Results   Component Value Date    WBC 13.30 (H) 2019    HGB 16.2 2019    HCT 48.4 2019     2019     Lab Results   Component Value Date    INR 0.94 2019    INR 0.99 2017    INR 0.98 07/10/2017     Lab Results   Component Value Date    MG 2.3 2019     Lab Results   Component Value Date    TSH 0.401 2019    TRIG 112 2018    HDL 34 (L) 2018     (H) 2018      Edward Rodas   Regadenoson Stress Test With Myocardial Perfusion SPECT (Multi Study)   Order# [id number]   Reading physician: Jae Payan MD Ordering physician: Jae Payan MD Study date: 19   Patient Information     Patient Name  Edward Rodas MRN  [id number] Sex  Male  (Age)  1940 (79 y.o.)   Interpretation Summary     · Findings consistent with a normal ECG stress test.  · Myocardial perfusion imaging indicates a small-sized, mild degree of ischemia located in the inferior wall.  · Normal LV cavity size. Normal LV wall motion noted.  · Left ventricular ejection fraction is normal (Calculated EF = 58%).  · Impressions are consistent with a low risk study.          Assessment/Plan :   Diagnosis Plan   1. Palpitations, improved with Toprol-XL.     2. Nonsustained ventricular tachycardia.     3. Abnormal nuclear stress test with mild degree of small size inferior wall myocardial ischemia on 2019.         Recommendations:  1. Since he is doing better with the current dose of Toprol-XL, we will leave him on the same dose.  2. I have asked him to cut back on the caffeine some more and see how he does.  3. We will up in 3 months    Return in about 3 months (around 3/11/2020).    As always, ShannonI appreciate very much the opportunity to participate in the cardiovascular care of your patients. Please do not hesitate to call me with any questions with regards to Edward Rodas evaluation and management.      With Best Regards,        Jae Payan MD, [facility]    Please note that portions of this note were completed with a voice recognition program.

## 2020-02-23 ENCOUNTER — HOSPITAL ENCOUNTER (EMERGENCY)
Facility: HOSPITAL | Age: 80
Discharge: HOME | End: 2020-02-23
Attending: EMERGENCY MEDICINE | Admitting: EMERGENCY MEDICINE

## 2020-02-23 VITALS
HEIGHT: 69 IN | DIASTOLIC BLOOD PRESSURE: 93 MMHG | TEMPERATURE: 97.8 F | SYSTOLIC BLOOD PRESSURE: 127 MMHG | OXYGEN SATURATION: 96 % | HEART RATE: 75 BPM | RESPIRATION RATE: 20 BRPM | BODY MASS INDEX: 34.36 KG/M2 | WEIGHT: 232 LBS

## 2020-02-23 DIAGNOSIS — R04.0 EPISTAXIS: Primary | ICD-10-CM

## 2020-02-23 LAB
ALBUMIN SERPL-MCNC: 3.79 G/DL (ref 3.5–5.2)
ALBUMIN/GLOB SERPL: 1.1 G/DL
ALP SERPL-CCNC: 97 U/L (ref 39–117)
ALT SERPL W P-5'-P-CCNC: 18 U/L (ref 1–41)
ANION GAP SERPL CALCULATED.3IONS-SCNC: 10.6 MMOL/L (ref 5–15)
APTT PPP: 28.3 SECONDS (ref 23.8–36.1)
AST SERPL-CCNC: 21 U/L (ref 1–40)
BASOPHILS # BLD AUTO: 0.04 10*3/MM3 (ref 0–0.2)
BASOPHILS NFR BLD AUTO: 0.3 % (ref 0–1.5)
BILIRUB SERPL-MCNC: 0.2 MG/DL (ref 0.2–1.2)
BUN BLD-MCNC: 15 MG/DL (ref 8–23)
BUN/CREAT SERPL: 12.4 (ref 7–25)
CALCIUM SPEC-SCNC: 8.8 MG/DL (ref 8.6–10.5)
CHLORIDE SERPL-SCNC: 101 MMOL/L (ref 98–107)
CO2 SERPL-SCNC: 25.4 MMOL/L (ref 22–29)
CREAT BLD-MCNC: 1.21 MG/DL (ref 0.76–1.27)
DEPRECATED RDW RBC AUTO: 47.6 FL (ref 37–54)
EOSINOPHIL # BLD AUTO: 0.26 10*3/MM3 (ref 0–0.4)
EOSINOPHIL NFR BLD AUTO: 2.1 % (ref 0.3–6.2)
ERYTHROCYTE [DISTWIDTH] IN BLOOD BY AUTOMATED COUNT: 13.9 % (ref 12.3–15.4)
GFR SERPL CREATININE-BSD FRML MDRD: 57 ML/MIN/1.73
GLOBULIN UR ELPH-MCNC: 3.3 GM/DL
GLUCOSE BLD-MCNC: 178 MG/DL (ref 65–99)
HCT VFR BLD AUTO: 47.3 % (ref 37.5–51)
HGB BLD-MCNC: 15.5 G/DL (ref 13–17.7)
IMM GRANULOCYTES # BLD AUTO: 0.05 10*3/MM3 (ref 0–0.05)
IMM GRANULOCYTES NFR BLD AUTO: 0.4 % (ref 0–0.5)
INR PPP: 0.9 (ref 0.9–1.1)
LYMPHOCYTES # BLD AUTO: 3.45 10*3/MM3 (ref 0.7–3.1)
LYMPHOCYTES NFR BLD AUTO: 28 % (ref 19.6–45.3)
MCH RBC QN AUTO: 30.9 PG (ref 26.6–33)
MCHC RBC AUTO-ENTMCNC: 32.8 G/DL (ref 31.5–35.7)
MCV RBC AUTO: 94.2 FL (ref 79–97)
MONOCYTES # BLD AUTO: 0.53 10*3/MM3 (ref 0.1–0.9)
MONOCYTES NFR BLD AUTO: 4.3 % (ref 5–12)
NEUTROPHILS # BLD AUTO: 7.97 10*3/MM3 (ref 1.7–7)
NEUTROPHILS NFR BLD AUTO: 64.9 % (ref 42.7–76)
NRBC BLD AUTO-RTO: 0 /100 WBC (ref 0–0.2)
PLATELET # BLD AUTO: 210 10*3/MM3 (ref 140–450)
PMV BLD AUTO: 10.3 FL (ref 6–12)
POTASSIUM BLD-SCNC: 3.7 MMOL/L (ref 3.5–5.2)
PROT SERPL-MCNC: 7.1 G/DL (ref 6–8.5)
PROTHROMBIN TIME: 12.6 SECONDS (ref 11–15.4)
RBC # BLD AUTO: 5.02 10*6/MM3 (ref 4.14–5.8)
SODIUM BLD-SCNC: 137 MMOL/L (ref 136–145)
WBC NRBC COR # BLD: 12.3 10*3/MM3 (ref 3.4–10.8)

## 2020-02-23 PROCEDURE — 36415 COLL VENOUS BLD VENIPUNCTURE: CPT

## 2020-02-23 PROCEDURE — 80053 COMPREHEN METABOLIC PANEL: CPT | Performed by: EMERGENCY MEDICINE

## 2020-02-23 PROCEDURE — 85610 PROTHROMBIN TIME: CPT | Performed by: EMERGENCY MEDICINE

## 2020-02-23 PROCEDURE — 85730 THROMBOPLASTIN TIME PARTIAL: CPT | Performed by: EMERGENCY MEDICINE

## 2020-02-23 PROCEDURE — 99282 EMERGENCY DEPT VISIT SF MDM: CPT

## 2020-02-23 PROCEDURE — 85025 COMPLETE CBC W/AUTO DIFF WBC: CPT | Performed by: EMERGENCY MEDICINE

## 2020-02-23 RX ORDER — OXYMETAZOLINE HYDROCHLORIDE 0.05 G/100ML
1 SPRAY NASAL ONCE
Status: DISCONTINUED | OUTPATIENT
Start: 2020-02-23 | End: 2020-02-23 | Stop reason: HOSPADM

## 2020-02-23 NOTE — ED NOTES
Went to get patients blood work. Patient is gone to the restroom at this time.      Pia Henderson  02/23/20 0844

## 2020-02-24 NOTE — DISCHARGE INSTRUCTIONS
Home in care of family.  Cannot sneeze or blow your nose.  Please use a humidifier in your home.  Use the Afrin nasal spray and nasal clamp as we discussed as needed for any further bleeding; return to the emergency department immediately if any heavy bleeding.  Follow-up with Ángela Bonilla in the office in 1 to 2 days for recheck.  Return to the emergency department right away if symptoms worsen/any problems.

## 2020-02-24 NOTE — ED PROVIDER NOTES
Subjective   Patient is an 80-year-old male who presents with a chief complaint of a nosebleed.  He reports that he has had intermittent epistaxis for the last week, has had 3 different episodes.  The bleeding is mainly been from his left naris, has had some from his right, has also ran down the back of his throat into his mouth.  He reports no history of nosebleeds, he is not on chronic anticoagulation other than a baby aspirin a day.  He has not taken an aspirin since Tuesday.  He reports that the area in his home is very dry, has been running the heat a lot recently.  States that his wife recently had a nosebleed as well.  He denies any nasal trauma.  He denies any other symptoms or complaints.          Review of Systems   Constitutional: Negative for chills, diaphoresis and fever.   HENT: Positive for nosebleeds. Negative for ear pain, sore throat and trouble swallowing.    Eyes: Negative for photophobia and pain.   Respiratory: Negative for shortness of breath and wheezing.    Cardiovascular: Negative for chest pain and palpitations.   Gastrointestinal: Negative for abdominal distention, abdominal pain, blood in stool, diarrhea, nausea and vomiting.   Endocrine: Negative for polydipsia and polyphagia.   Genitourinary: Negative for difficulty urinating and flank pain.   Musculoskeletal: Negative for back pain, neck pain and neck stiffness.   Skin: Negative for color change and pallor.   Neurological: Negative for seizures, syncope and speech difficulty.   Psychiatric/Behavioral: Negative for confusion.   All other systems reviewed and are negative.      Past Medical History:   Diagnosis Date   • Allergic rhinitis    • Asthma    • Cancer (CMS/HCC)     basal cell skin cancer   • Disease of thyroid gland    • Dysrhythmia    • GERD (gastroesophageal reflux disease)    • Heart murmur     as infant   • Hyperlipidemia    • Hypertension    • Near syncope        Allergies   Allergen Reactions   • Sulfa Antibiotics  Unknown (See Comments)     States lowers blood sugar   • Diprivan [Propofol] Angioedema   • Statins Other (See Comments)     Aches and confusion   • Shellfish-Derived Products Hives and Itching       Past Surgical History:   Procedure Laterality Date   • TONSILLECTOMY         Family History   Problem Relation Age of Onset   • Heart disease Brother        Social History     Socioeconomic History   • Marital status:      Spouse name: Not on file   • Number of children: Not on file   • Years of education: Not on file   • Highest education level: Not on file   Tobacco Use   • Smoking status: Former Smoker     Packs/day: 1.00     Types: Cigarettes     Last attempt to quit:      Years since quittin.1   • Smokeless tobacco: Never Used   Substance and Sexual Activity   • Alcohol use: No   • Drug use: No   • Sexual activity: Defer   Social History Narrative    Caffeine use: 3-4 cups coffee daily.    Patient lives at home with his wife.            Objective   Physical Exam   Constitutional: He is oriented to person, place, and time. He appears well-developed and well-nourished. No distress.   Patient appears anxious not otherwise in distress.  When I walked in the room he was blowing his nose, there was only a small amount of clear rhinorrhea with only a very mild red tinge expressed.   HENT:   Head: Normocephalic and atraumatic.   Mouth/Throat: Oropharynx is clear and moist.   Inspection of the nasal cavities reveals no signs of active bleeding.  No bleeders are visualized, nothing to cauterize.   Eyes: Pupils are equal, round, and reactive to light. EOM are normal. No scleral icterus.   Neck: Normal range of motion. Neck supple. No neck rigidity. No tracheal deviation present.   Cardiovascular: Normal rate, regular rhythm and intact distal pulses.   Pulmonary/Chest: Effort normal and breath sounds normal. No respiratory distress. He exhibits no tenderness.   Abdominal: Soft. Bowel sounds are normal. There is  no tenderness. There is no rebound and no guarding.   Musculoskeletal: Normal range of motion. He exhibits no tenderness.   Neurological: He is alert and oriented to person, place, and time. He has normal strength. No sensory deficit. He exhibits normal muscle tone. Coordination normal. GCS eye subscore is 4. GCS verbal subscore is 5. GCS motor subscore is 6.   Skin: Skin is warm and dry. Capillary refill takes less than 2 seconds. He is not diaphoretic. No cyanosis. No pallor.   Psychiatric: He has a normal mood and affect. His behavior is normal.   Nursing note and vitals reviewed.      Procedures  Results for orders placed or performed during the hospital encounter of 02/23/20   Comprehensive Metabolic Panel   Result Value Ref Range    Glucose 178 (H) 65 - 99 mg/dL    BUN 15 8 - 23 mg/dL    Creatinine 1.21 0.76 - 1.27 mg/dL    Sodium 137 136 - 145 mmol/L    Potassium 3.7 3.5 - 5.2 mmol/L    Chloride 101 98 - 107 mmol/L    CO2 25.4 22.0 - 29.0 mmol/L    Calcium 8.8 8.6 - 10.5 mg/dL    Total Protein 7.1 6.0 - 8.5 g/dL    Albumin 3.79 3.50 - 5.20 g/dL    ALT (SGPT) 18 1 - 41 U/L    AST (SGOT) 21 1 - 40 U/L    Alkaline Phosphatase 97 39 - 117 U/L    Total Bilirubin 0.2 0.2 - 1.2 mg/dL    eGFR Non African Amer 57 (L) >60 mL/min/1.73    Globulin 3.3 gm/dL    A/G Ratio 1.1 g/dL    BUN/Creatinine Ratio 12.4 7.0 - 25.0    Anion Gap 10.6 5.0 - 15.0 mmol/L   Protime-INR   Result Value Ref Range    Protime 12.6 11.0 - 15.4 Seconds    INR 0.90 0.90 - 1.10   aPTT   Result Value Ref Range    PTT 28.3 23.8 - 36.1 seconds   CBC Auto Differential   Result Value Ref Range    WBC 12.30 (H) 3.40 - 10.80 10*3/mm3    RBC 5.02 4.14 - 5.80 10*6/mm3    Hemoglobin 15.5 13.0 - 17.7 g/dL    Hematocrit 47.3 37.5 - 51.0 %    MCV 94.2 79.0 - 97.0 fL    MCH 30.9 26.6 - 33.0 pg    MCHC 32.8 31.5 - 35.7 g/dL    RDW 13.9 12.3 - 15.4 %    RDW-SD 47.6 37.0 - 54.0 fl    MPV 10.3 6.0 - 12.0 fL    Platelets 210 140 - 450 10*3/mm3    Neutrophil % 64.9  42.7 - 76.0 %    Lymphocyte % 28.0 19.6 - 45.3 %    Monocyte % 4.3 (L) 5.0 - 12.0 %    Eosinophil % 2.1 0.3 - 6.2 %    Basophil % 0.3 0.0 - 1.5 %    Immature Grans % 0.4 0.0 - 0.5 %    Neutrophils, Absolute 7.97 (H) 1.70 - 7.00 10*3/mm3    Lymphocytes, Absolute 3.45 (H) 0.70 - 3.10 10*3/mm3    Monocytes, Absolute 0.53 0.10 - 0.90 10*3/mm3    Eosinophils, Absolute 0.26 0.00 - 0.40 10*3/mm3    Basophils, Absolute 0.04 0.00 - 0.20 10*3/mm3    Immature Grans, Absolute 0.05 0.00 - 0.05 10*3/mm3    nRBC 0.0 0.0 - 0.2 /100 WBC              ED Course  ED Course as of Feb 23 1939   Sun Feb 23, 2020 1916 I placed a spray of Afrin into each naris, placed a nasal clamp, removed it after 30 minutes.  There is no bleeding.  We are awaiting the patient's blood work.  I have discussed the situation at length with the patient and his family.    [CM]      ED Course User Index  [CM] Hung Smyth MD                                           [facility]    Final diagnoses:   Epistaxis             Please note that portions of this note were completed with a voice recognition program.        Hung Smyth MD  02/23/20 1940

## 2020-06-09 ENCOUNTER — TELEPHONE (OUTPATIENT)
Dept: CARDIOLOGY | Facility: CLINIC | Age: 80
End: 2020-06-09

## 2020-06-09 RX ORDER — METOPROLOL SUCCINATE 50 MG/1
75 TABLET, EXTENDED RELEASE ORAL DAILY
Qty: 45 TABLET | Refills: 5 | Status: SHIPPED | OUTPATIENT
Start: 2020-06-09 | End: 2020-06-11 | Stop reason: SDUPTHER

## 2020-06-09 NOTE — TELEPHONE ENCOUNTER
Called pt and confirmed that he does take 1.5 tabs QD. He stated that he does. I will send this to [facility].

## 2020-06-11 ENCOUNTER — TELEPHONE (OUTPATIENT)
Dept: CARDIOLOGY | Facility: CLINIC | Age: 80
End: 2020-06-11

## 2020-06-11 RX ORDER — METOPROLOL SUCCINATE 50 MG/1
75 TABLET, EXTENDED RELEASE ORAL DAILY
Qty: 45 TABLET | Refills: 5 | Status: SHIPPED | OUTPATIENT
Start: 2020-06-11 | End: 2021-03-17 | Stop reason: SDUPTHER

## 2020-06-11 NOTE — TELEPHONE ENCOUNTER
Please call patient regarding his recent medication that was sent to [facility] on 6-9-20.  Patient needs it to be sent to [facility].  Patient requests a phone call back and is requesting this be completed today.

## 2020-06-15 ENCOUNTER — TELEPHONE (OUTPATIENT)
Dept: CARDIOLOGY | Facility: CLINIC | Age: 80
End: 2020-06-15

## 2020-06-15 NOTE — TELEPHONE ENCOUNTER
Called pt and he stated that the pharmacy advised him to take 1.5 tabs of 75 mg QD. I advised him that I had sent in the the Metoprolol Succ XL 50 mg 1.5 tabs on the 11th. He stated that was not what they gave him. I advised him that I will call his pharmacy and see what's going on.     Called his pharmacy spoke with Kiersten and got the correct Metoprolol and MG called in for him.     Called pt to advise him no answer LM.

## 2020-07-17 ENCOUNTER — OFFICE VISIT (OUTPATIENT)
Dept: CARDIOLOGY | Facility: CLINIC | Age: 80
End: 2020-07-17

## 2020-07-17 VITALS
TEMPERATURE: 98.7 F | DIASTOLIC BLOOD PRESSURE: 86 MMHG | HEART RATE: 80 BPM | OXYGEN SATURATION: 98 % | SYSTOLIC BLOOD PRESSURE: 156 MMHG | BODY MASS INDEX: 33.92 KG/M2 | WEIGHT: 229 LBS | HEIGHT: 69 IN

## 2020-07-17 DIAGNOSIS — I47.29 NONSUSTAINED VENTRICULAR TACHYCARDIA (HCC): ICD-10-CM

## 2020-07-17 DIAGNOSIS — R94.39 ABNORMAL NUCLEAR STRESS TEST: ICD-10-CM

## 2020-07-17 DIAGNOSIS — R00.2 PALPITATIONS: Primary | ICD-10-CM

## 2020-07-17 DIAGNOSIS — R53.83 FATIGUE, UNSPECIFIED TYPE: ICD-10-CM

## 2020-07-17 PROCEDURE — 99214 OFFICE O/P EST MOD 30 MIN: CPT | Performed by: INTERNAL MEDICINE

## 2020-07-17 PROCEDURE — 93000 ELECTROCARDIOGRAM COMPLETE: CPT | Performed by: INTERNAL MEDICINE

## 2020-07-17 RX ORDER — LEVOTHYROXINE SODIUM 112 UG/1
112 TABLET ORAL DAILY
COMMUNITY
Start: 2020-06-26

## 2020-07-17 RX ORDER — TRIAMCINOLONE ACETONIDE 1 MG/G
CREAM TOPICAL
Status: ON HOLD | COMMUNITY
Start: 2020-07-06 | End: 2021-08-25

## 2020-07-17 NOTE — PROGRESS NOTES
Ángela Bonilla, APRN  Edward Rodas  1940 07/17/2020    Patient Active Problem List   Diagnosis   • Essential hypertension   • Palpitations   • Near syncope   • Nonsustained supraventricular tachycardia (CMS/HCC)   • Nonsustained ventricular tachycardia (CMS/HCC)   • Abnormal nuclear stress test       Ángela Santana, APRN:    Subjective     Edward Rodas is a 80 y.o. male with the problems as listed above, presents    Chief Complaint: Follow-up of ventricular arrhythmias and abnormal nuclear stress test.     History of Present Illness: Mr. Rodas is a pleasant 80-year-old  male with history of ventricular arrhythmias in the form of nonsustained ventricular tachycardia and a mildly abnormal nuclear stress test with mild inferior wall myocardial ischemia in January 2019.  He is here for regular cardiology follow-up.  On today's visit he states he gets tired at times and has some problems with balance. He denies any dizziness or syncope. He has some intermittent palpitations.  He denies any chest pains or significant shortness of breath.    Allergies   Allergen Reactions   • Sulfa Antibiotics Unknown (See Comments)     States lowers blood sugar   • Diprivan [Propofol] Angioedema   • Statins Other (See Comments)     Aches and confusion   • Shellfish-Derived Products Hives and Itching   :      Current Outpatient Medications:   •  amLODIPine (NORVASC) 5 MG tablet, Take 5 mg by mouth Daily., Disp: , Rfl:   •  budesonide-formoterol (SYMBICORT) 160-4.5 MCG/ACT inhaler, Inhale 2 puffs 2 (Two) Times a Day., Disp: , Rfl:   •  hydrOXYzine (ATARAX) 10 MG tablet, Take 10 mg by mouth As Needed for Itching., Disp: , Rfl:   •  levothyroxine (SYNTHROID, LEVOTHROID) 112 MCG tablet, Take 112 mcg by mouth Daily., Disp: , Rfl:   •  metoprolol succinate XL (TOPROL-XL) 50 MG 24 hr tablet, Take 1.5 tablets by mouth Daily., Disp: 45 tablet, Rfl: 5  •  triamcinolone (KENALOG) 0.1 % cream, APPLY A THIN FILM TO ARMS THREE  "TIMES PER DAY, Disp: , Rfl:   •  valsartan-hydrochlorothiazide (DIOVAN-HCT) 160-12.5 MG per tablet, Take 1 tablet by mouth Daily., Disp: , Rfl:   •  albuterol (PROVENTIL) (2.5 MG/3ML) 0.083% nebulizer solution, Take 2.5 mg by nebulization Every 4 (Four) Hours As Needed for Wheezing., Disp: , Rfl:   •  aspirin 81 MG EC tablet, Take 81 mg by mouth Daily., Disp: , Rfl:   •  MAGNESIUM CITRATE PO, Take 250 mg by mouth Daily., Disp: , Rfl:   •  Omega-3 Fatty Acids (FISH OIL) 1000 MG capsule capsule, Take 2,400 mg by mouth Daily With Breakfast., Disp: , Rfl:       The following portions of the patient's history were reviewed and updated as appropriate: allergies, current medications, past family history, past medical history, past social history, past surgical history and problem list.    Social History     Tobacco Use   • Smoking status: Former Smoker     Packs/day: 1.00     Types: Cigarettes     Last attempt to quit: 1958     Years since quittin.5   • Smokeless tobacco: Never Used   Substance Use Topics   • Alcohol use: No   • Drug use: No       Review of Systems   Constitution: Positive for malaise/fatigue. Negative for chills, fever, weight gain and weight loss.   HENT: Negative for congestion and nosebleeds.    Cardiovascular: Negative for chest pain, irregular heartbeat, leg swelling and orthopnea.   Respiratory: Negative for cough, hemoptysis and shortness of breath.    Gastrointestinal: Negative for abdominal pain, change in bowel habit, hematemesis, melena and vomiting.   Genitourinary: Negative for dysuria, frequency and hematuria.   Neurological: Negative for focal weakness, headaches, light-headedness and weakness.       Objective   Vitals:    20 1005   BP: 156/86   Pulse: 80   Temp: 98.7 °F (37.1 °C)   SpO2: 98%   Weight: 104 kg (229 lb)   Height: 175.3 cm (69\")     Body mass index is 33.82 kg/m².      Physical Exam   Constitutional: He is oriented to person, place, and time. He appears " well-developed and well-nourished.   HENT:   Mouth/Throat: Oropharynx is clear and moist.   Eyes: Pupils are equal, round, and reactive to light. EOM are normal.   Neck: Neck supple. No JVD present. No tracheal deviation present. No thyromegaly present.   Cardiovascular: Normal rate, regular rhythm, S1 normal and S2 normal. Exam reveals no gallop and no friction rub.   No murmur heard.  Pulmonary/Chest: Effort normal and breath sounds normal.   Abdominal: Soft. Bowel sounds are normal. He exhibits no mass. There is no tenderness.   Musculoskeletal: Normal range of motion. He exhibits no edema.   Lymphadenopathy:     He has no cervical adenopathy.   Neurological: He is alert and oriented to person, place, and time.   Skin: Skin is warm and dry. No rash noted.   Psychiatric: He has a normal mood and affect.       Lab Results   Component Value Date     02/23/2020    K 3.7 02/23/2020     02/23/2020    CO2 25.4 02/23/2020    BUN 15 02/23/2020    CREATININE 1.21 02/23/2020    GLUCOSE 178 (H) 02/23/2020    CALCIUM 8.8 02/23/2020    AST 21 02/23/2020    ALT 18 02/23/2020    ALKPHOS 97 02/23/2020     No results found for: CKTOTAL  Lab Results   Component Value Date    WBC 12.30 (H) 02/23/2020    HGB 15.5 02/23/2020    HCT 47.3 02/23/2020     02/23/2020     Lab Results   Component Value Date    INR 0.90 02/23/2020    INR 0.94 07/20/2019    INR 0.99 12/04/2017     Lab Results   Component Value Date    MG 2.3 09/11/2019     Lab Results   Component Value Date    TSH 0.401 07/20/2019    TRIG 112 08/08/2018    HDL 34 (L) 08/08/2018     (H) 08/08/2018            ECG 12 Lead  Date/Time: 7/17/2020 9:58 AM  Performed by: Jae Payan MD  Authorized by: Jae Payan MD   Comparison: compared with previous ECG from 7/20/2019  Comparison to previous ECG: Patient did not have any significant PACs on the previous EKG as compared to the current EKG.  Rhythm: sinus rhythm  Conduction: conduction normal  ST  Segments: ST segments normal  T Waves: T waves normal  Comments: Atrial supraventricular bigeminy.          Assessment/Plan :   Diagnosis Plan   1. Palpitations, improved.     2. Nonsustained ventricular tachycardia, seems stable on metoprolol succinate.     3. Abnormal nuclear stress test with mild degree of inferior wall myocardial ischemia.         Recommendations:  1. Continue with low-dose aspirin daily, metoprolol succinate and amlodipine at current dose.  2. I have asked him to keep a check on the blood pressure at home.  3. We will evaluate his fatigue with a 48 Holter monitor.    Return in about 5 weeks (around 8/21/2020).    As always, Ángela  I appreciate very much the opportunity to participate in the cardiovascular care of your patients. Please do not hesitate to call me with any questions with regards to Edward Rodas's evaluation and management.       With Best Regards,        Jae Payan MD, [facility]    Please note that portions of this note were completed with a voice recognition program.

## 2020-07-20 ENCOUNTER — HOSPITAL ENCOUNTER (OUTPATIENT)
Dept: RESPIRATORY THERAPY | Facility: HOSPITAL | Age: 80
Discharge: HOME | End: 2020-07-20
Admitting: INTERNAL MEDICINE

## 2020-07-20 DIAGNOSIS — R53.83 FATIGUE, UNSPECIFIED TYPE: ICD-10-CM

## 2020-07-20 DIAGNOSIS — R00.2 PALPITATIONS: ICD-10-CM

## 2020-07-20 PROCEDURE — 93226 XTRNL ECG REC<48 HR SCAN A/R: CPT

## 2020-07-20 PROCEDURE — 93227 XTRNL ECG REC<48 HR R&I: CPT | Performed by: INTERNAL MEDICINE

## 2020-07-20 PROCEDURE — 93225 XTRNL ECG REC<48 HRS REC: CPT

## 2020-08-03 ENCOUNTER — HOSPITAL ENCOUNTER (EMERGENCY)
Facility: HOSPITAL | Age: 80
Discharge: HOME | End: 2020-08-03
Attending: FAMILY MEDICINE | Admitting: FAMILY MEDICINE

## 2020-08-03 ENCOUNTER — APPOINTMENT (OUTPATIENT)
Dept: CT IMAGING | Facility: HOSPITAL | Age: 80
End: 2020-08-03

## 2020-08-03 VITALS
SYSTOLIC BLOOD PRESSURE: 165 MMHG | DIASTOLIC BLOOD PRESSURE: 99 MMHG | RESPIRATION RATE: 18 BRPM | TEMPERATURE: 98.1 F | WEIGHT: 225 LBS | OXYGEN SATURATION: 98 % | HEART RATE: 67 BPM | BODY MASS INDEX: 32.21 KG/M2 | HEIGHT: 70 IN

## 2020-08-03 DIAGNOSIS — I10 UNCONTROLLED HYPERTENSION: ICD-10-CM

## 2020-08-03 DIAGNOSIS — J01.00 ACUTE NON-RECURRENT MAXILLARY SINUSITIS: ICD-10-CM

## 2020-08-03 DIAGNOSIS — R04.0 EPISTAXIS: Primary | ICD-10-CM

## 2020-08-03 LAB
ALBUMIN SERPL-MCNC: 4 G/DL (ref 3.5–5.2)
ALBUMIN/GLOB SERPL: 1.2 G/DL
ALP SERPL-CCNC: 97 U/L (ref 39–117)
ALT SERPL W P-5'-P-CCNC: 17 U/L (ref 1–41)
ANION GAP SERPL CALCULATED.3IONS-SCNC: 12.2 MMOL/L (ref 5–15)
APTT PPP: 28.1 SECONDS (ref 25.6–35.3)
AST SERPL-CCNC: 15 U/L (ref 1–40)
BASOPHILS # BLD AUTO: 0.04 10*3/MM3 (ref 0–0.2)
BASOPHILS NFR BLD AUTO: 0.3 % (ref 0–1.5)
BILIRUB SERPL-MCNC: 0.4 MG/DL (ref 0–1.2)
BUN SERPL-MCNC: 16 MG/DL (ref 8–23)
BUN/CREAT SERPL: 14 (ref 7–25)
CALCIUM SPEC-SCNC: 9.1 MG/DL (ref 8.6–10.5)
CHLORIDE SERPL-SCNC: 103 MMOL/L (ref 98–107)
CO2 SERPL-SCNC: 21.8 MMOL/L (ref 22–29)
CREAT SERPL-MCNC: 1.14 MG/DL (ref 0.76–1.27)
DEPRECATED RDW RBC AUTO: 47 FL (ref 37–54)
EOSINOPHIL # BLD AUTO: 0.23 10*3/MM3 (ref 0–0.4)
EOSINOPHIL NFR BLD AUTO: 1.4 % (ref 0.3–6.2)
ERYTHROCYTE [DISTWIDTH] IN BLOOD BY AUTOMATED COUNT: 13.8 % (ref 12.3–15.4)
GFR SERPL CREATININE-BSD FRML MDRD: 61 ML/MIN/1.73
GLOBULIN UR ELPH-MCNC: 3.3 GM/DL
GLUCOSE SERPL-MCNC: 165 MG/DL (ref 65–99)
HCT VFR BLD AUTO: 49.1 % (ref 37.5–51)
HGB BLD-MCNC: 16.2 G/DL (ref 13–17.7)
IMM GRANULOCYTES # BLD AUTO: 0.07 10*3/MM3 (ref 0–0.05)
IMM GRANULOCYTES NFR BLD AUTO: 0.4 % (ref 0–0.5)
INR PPP: 1.03 (ref 0.9–1.1)
LYMPHOCYTES # BLD AUTO: 4.98 10*3/MM3 (ref 0.7–3.1)
LYMPHOCYTES NFR BLD AUTO: 31.2 % (ref 19.6–45.3)
MCH RBC QN AUTO: 30.6 PG (ref 26.6–33)
MCHC RBC AUTO-ENTMCNC: 33 G/DL (ref 31.5–35.7)
MCV RBC AUTO: 92.6 FL (ref 79–97)
MONOCYTES # BLD AUTO: 0.69 10*3/MM3 (ref 0.1–0.9)
MONOCYTES NFR BLD AUTO: 4.3 % (ref 5–12)
NEUTROPHILS NFR BLD AUTO: 62.4 % (ref 42.7–76)
NEUTROPHILS NFR BLD AUTO: 9.97 10*3/MM3 (ref 1.7–7)
NRBC BLD AUTO-RTO: 0 /100 WBC (ref 0–0.2)
PLATELET # BLD AUTO: 239 10*3/MM3 (ref 140–450)
PMV BLD AUTO: 10.1 FL (ref 6–12)
POTASSIUM SERPL-SCNC: 3.8 MMOL/L (ref 3.5–5.2)
PROT SERPL-MCNC: 7.3 G/DL (ref 6–8.5)
PROTHROMBIN TIME: 13.3 SECONDS (ref 11.9–14.1)
RBC # BLD AUTO: 5.3 10*6/MM3 (ref 4.14–5.8)
SODIUM SERPL-SCNC: 137 MMOL/L (ref 136–145)
TROPONIN T SERPL-MCNC: <0.01 NG/ML (ref 0–0.03)
WBC # BLD AUTO: 15.98 10*3/MM3 (ref 3.4–10.8)

## 2020-08-03 PROCEDURE — 96365 THER/PROPH/DIAG IV INF INIT: CPT

## 2020-08-03 PROCEDURE — 85730 THROMBOPLASTIN TIME PARTIAL: CPT | Performed by: FAMILY MEDICINE

## 2020-08-03 PROCEDURE — 99284 EMERGENCY DEPT VISIT MOD MDM: CPT

## 2020-08-03 PROCEDURE — 84484 ASSAY OF TROPONIN QUANT: CPT | Performed by: FAMILY MEDICINE

## 2020-08-03 PROCEDURE — 70450 CT HEAD/BRAIN W/O DYE: CPT | Performed by: RADIOLOGY

## 2020-08-03 PROCEDURE — 93005 ELECTROCARDIOGRAM TRACING: CPT | Performed by: FAMILY MEDICINE

## 2020-08-03 PROCEDURE — 25010000002 CEFTRIAXONE: Performed by: FAMILY MEDICINE

## 2020-08-03 PROCEDURE — 85610 PROTHROMBIN TIME: CPT | Performed by: FAMILY MEDICINE

## 2020-08-03 PROCEDURE — 85025 COMPLETE CBC W/AUTO DIFF WBC: CPT | Performed by: FAMILY MEDICINE

## 2020-08-03 PROCEDURE — 70450 CT HEAD/BRAIN W/O DYE: CPT

## 2020-08-03 PROCEDURE — 93010 ELECTROCARDIOGRAM REPORT: CPT | Performed by: INTERNAL MEDICINE

## 2020-08-03 PROCEDURE — 80053 COMPREHEN METABOLIC PANEL: CPT | Performed by: FAMILY MEDICINE

## 2020-08-03 RX ORDER — CEFDINIR 300 MG/1
300 CAPSULE ORAL 2 TIMES DAILY
Qty: 20 CAPSULE | Refills: 0 | Status: SHIPPED | OUTPATIENT
Start: 2020-08-03 | End: 2021-03-17

## 2020-08-03 RX ORDER — OXYMETAZOLINE HYDROCHLORIDE 0.05 G/100ML
1 SPRAY NASAL ONCE
Status: COMPLETED | OUTPATIENT
Start: 2020-08-03 | End: 2020-08-03

## 2020-08-03 RX ADMIN — Medication 1 SPRAY: at 15:52

## 2020-08-03 RX ADMIN — TRANEXAMIC ACID 500 MG: 100 INJECTION, SOLUTION INTRAVENOUS at 17:35

## 2020-08-03 RX ADMIN — CEFTRIAXONE 1 G: 1 INJECTION, POWDER, FOR SOLUTION INTRAMUSCULAR; INTRAVENOUS at 19:28

## 2020-08-03 NOTE — ED PROVIDER NOTES
Subjective   80-year-old white male with past medical history of hypertension, hyperlipidemia presents emergency department complaining of nosebleed that started about 2:45.  Patient denies any anticoagulations.  Patient says he has not even taken his aspirin here recently.  Patient reports he has had nosebleeds before.  Patient reports that he has had a sinus infection for the last 2 weeks has taken a round of Augmentin.  Says that he is also been seeing his cardiologist and they have been adjusting his blood pressure medicine they have reduced his metoprolol and he stopped his amlodipine due to leg swelling and that he was supposed to start half of a back and he had been doing well but he has not had that for the last 3 days.  He denies any chest pain he says he is very anxious right now that is why every time he comes to the doctor his blood pressures up some.      History provided by:  Patient  Nose Bleed   Location:  L nare  Severity:  Moderate  Timing:  Constant  Progression:  Unchanged  Chronicity:  Recurrent  Context: not anticoagulants, not aspirin use, not BiPAP, not bleeding disorder, not CPAP, not drug use, not elevation change and not hypertension    Relieved by:  Nothing  Worsened by:  Nothing  Ineffective treatments:  None tried  Associated symptoms: no fever        Review of Systems   Constitutional: Negative.  Negative for fever.   HENT: Positive for nosebleeds.    Respiratory: Negative.    Cardiovascular: Negative.  Negative for chest pain.   Gastrointestinal: Negative.  Negative for abdominal pain.   Endocrine: Negative.    Genitourinary: Negative.  Negative for dysuria.   Skin: Negative.    Neurological: Negative.    Psychiatric/Behavioral: Negative.    All other systems reviewed and are negative.      Past Medical History:   Diagnosis Date   • Allergic rhinitis    • Asthma    • Cancer (CMS/HCC)     basal cell skin cancer   • Disease of thyroid gland    • Dysrhythmia    • GERD (gastroesophageal  reflux disease)    • Heart murmur     as infant   • Hyperlipidemia    • Hypertension    • Near syncope        Allergies   Allergen Reactions   • Sulfa Antibiotics Unknown (See Comments)     States lowers blood sugar   • Diprivan [Propofol] Angioedema   • Statins Other (See Comments)     Aches and confusion   • Shellfish-Derived Products Hives and Itching       Past Surgical History:   Procedure Laterality Date   • TONSILLECTOMY         Family History   Problem Relation Age of Onset   • Heart disease Brother        Social History     Socioeconomic History   • Marital status:      Spouse name: Not on file   • Number of children: Not on file   • Years of education: Not on file   • Highest education level: Not on file   Tobacco Use   • Smoking status: Former Smoker     Packs/day: 1.00     Types: Cigarettes     Last attempt to quit:      Years since quittin.6   • Smokeless tobacco: Never Used   Substance and Sexual Activity   • Alcohol use: No   • Drug use: No   • Sexual activity: Defer   Social History Narrative    Caffeine use: 3-4 cups coffee daily.    Patient lives at home with his wife.            Objective   Physical Exam   Constitutional: He is oriented to person, place, and time. He appears well-developed and well-nourished. He appears distressed.   HENT:   Head: Normocephalic and atraumatic.   Right Ear: External ear normal.   Left Ear: External ear normal.   Nose: Epistaxis is observed. Right sinus exhibits maxillary sinus tenderness. Left sinus exhibits maxillary sinus tenderness.   Eyes: Pupils are equal, round, and reactive to light. EOM are normal.   Neck: Neck supple.   Cardiovascular: Normal rate and regular rhythm.   Pulmonary/Chest: Effort normal and breath sounds normal.   Abdominal: Soft. Bowel sounds are normal.   Musculoskeletal: Normal range of motion.   Neurological: He is alert and oriented to person, place, and time.   Skin: Skin is warm. Capillary refill takes less than 2  seconds.   Psychiatric: He has a normal mood and affect. His behavior is normal. Judgment and thought content normal.   Nursing note and vitals reviewed.      Procedures           ED Course  ED Course as of Aug 04 1519   Mon Aug 03, 2020   1841 EKG interpretation time is 1728 normal sinus rhythm 76 bpm QRS duration is 86 QT is 392 QTC is 411 no evidence of acute ST elevation or depression at this time.    []   1841 Unsuccessful with Afrin.  Visual examination shows that there is a large clot in the left nare, I have irrigated it with the normal saline and patient has been able to cough up a large blood clot.  It does appear at this time that the bleeding is controlled however since he has had such time with this episode I did go ahead and pack the left nare with TXA.    []   1911 EKG: normal EKG, normal sinus rhythm, unchanged from previous tracings      []      ED Course User Index  [] Sabrina Mann DO                                           MDM  Number of Diagnoses or Management Options  Acute non-recurrent maxillary sinusitis: new and requires workup  Epistaxis: new and requires workup  Uncontrolled hypertension: new and requires workup     Amount and/or Complexity of Data Reviewed  Clinical lab tests: ordered and reviewed  Tests in the radiology section of CPT®: ordered and reviewed  Tests in the medicine section of CPT®: reviewed and ordered  Discuss the patient with other providers: yes  Independent visualization of images, tracings, or specimens: yes    Risk of Complications, Morbidity, and/or Mortality  Presenting problems: high  Diagnostic procedures: high  Management options: high    Patient Progress  Patient progress: stable      Final diagnoses:   Epistaxis   Acute non-recurrent maxillary sinusitis   Uncontrolled hypertension            Sabrina Mann DO  08/04/20 [id number]

## 2020-08-03 NOTE — ED NOTES
Dr. Mann verbalize she does not want cultures prior to antibiotic administration.      Weston Rausch, RN  08/03/20 1925

## 2020-08-04 ENCOUNTER — TELEPHONE (OUTPATIENT)
Dept: CARDIOLOGY | Facility: CLINIC | Age: 80
End: 2020-08-04

## 2020-08-04 NOTE — TELEPHONE ENCOUNTER
Called pt's son back and stated that Edward is real worried about his nose bleeds. They went to the ER yesterday and it took them a little while to get it stopped. Edward is afraid that it may be due to his -166/90-99. Pt has an with appt with  on 9/01/2020.

## 2020-08-06 ENCOUNTER — TELEPHONE (OUTPATIENT)
Dept: CARDIOLOGY | Facility: CLINIC | Age: 80
End: 2020-08-06

## 2020-08-06 NOTE — TELEPHONE ENCOUNTER
Patient says he has started back on 10 mg for a couple weeks and thinks that his BP has been attributed to stress.  He has ENT apt tomorrow .. He also states he has a sinus inection that may also cause this

## 2020-08-07 NOTE — TELEPHONE ENCOUNTER
Ok, if it persists and ENT does not think he has a sinus infection we can schedule him to be seen sooner too.

## 2020-08-13 ENCOUNTER — HOSPITAL ENCOUNTER (EMERGENCY)
Facility: HOSPITAL | Age: 80
Discharge: HOME | End: 2020-08-13
Attending: FAMILY MEDICINE | Admitting: FAMILY MEDICINE

## 2020-08-13 VITALS
WEIGHT: 229 LBS | HEART RATE: 74 BPM | OXYGEN SATURATION: 97 % | RESPIRATION RATE: 18 BRPM | TEMPERATURE: 97.7 F | SYSTOLIC BLOOD PRESSURE: 157 MMHG | HEIGHT: 69 IN | BODY MASS INDEX: 33.92 KG/M2 | DIASTOLIC BLOOD PRESSURE: 74 MMHG

## 2020-08-13 DIAGNOSIS — R04.0 BLEEDING FROM THE NOSE: Primary | ICD-10-CM

## 2020-08-13 PROCEDURE — 99283 EMERGENCY DEPT VISIT LOW MDM: CPT

## 2020-08-13 RX ORDER — AMOXICILLIN AND CLAVULANATE POTASSIUM 875; 125 MG/1; MG/1
1 TABLET, FILM COATED ORAL 2 TIMES DAILY
Qty: 20 TABLET | Refills: 0 | Status: SHIPPED | OUTPATIENT
Start: 2020-08-13 | End: 2021-03-17

## 2020-08-13 NOTE — ED NOTES
Physician at bedside and removed nasal packing from left nostril brownish thick drainage removed with packing no brightred bleeding present the patient tolerated the procedure well. Will continue to monitor post removal until DC     Konrad Espitia RN  08/13/20 [id number]

## 2020-08-13 NOTE — ED PROVIDER NOTES
Subjective   Patient is an 80-year-old male who presents emergency department for removal of nasal packing.  The patient had a nosebleed on the second, and had his nose cauterized and packed on the sixth.  The patient went to ENT today to have his packing removed but ENT office did not have backup packing in case there is rebleeding.  The patient is not on any blood thinners.  He is prescribed aspirin but takes it sporadically.  The patient has not noticed any rebleeding.  He has no other complaints at this time.  He states that he just wants his packing removed and to make sure that he does not need to have a repacked.          Review of Systems   Constitutional: Negative.    HENT: Positive for sinus pressure.    Eyes: Negative.    Respiratory: Negative.    Genitourinary: Negative.    Neurological: Negative.    Psychiatric/Behavioral: Negative.    All other systems reviewed and are negative.      Past Medical History:   Diagnosis Date   • Allergic rhinitis    • Asthma    • Cancer (CMS/HCC)     basal cell skin cancer   • Disease of thyroid gland    • Dysrhythmia    • GERD (gastroesophageal reflux disease)    • Heart murmur     as infant   • Hyperlipidemia    • Hypertension    • Near syncope        Allergies   Allergen Reactions   • Sulfa Antibiotics Unknown (See Comments)     States lowers blood sugar   • Diprivan [Propofol] Angioedema   • Statins Other (See Comments)     Aches and confusion   • Shellfish-Derived Products Hives and Itching       Past Surgical History:   Procedure Laterality Date   • TONSILLECTOMY         Family History   Problem Relation Age of Onset   • Heart disease Brother        Social History     Socioeconomic History   • Marital status:      Spouse name: Not on file   • Number of children: Not on file   • Years of education: Not on file   • Highest education level: Not on file   Tobacco Use   • Smoking status: Former Smoker     Packs/day: 1.00     Types: Cigarettes     Last attempt to  quit: 1958     Years since quittin.6   • Smokeless tobacco: Never Used   Substance and Sexual Activity   • Alcohol use: No   • Drug use: No   • Sexual activity: Defer   Social History Narrative    Caffeine use: 3-4 cups coffee daily.    Patient lives at home with his wife.            Objective   Physical Exam   Constitutional: He is oriented to person, place, and time. He appears well-developed and well-nourished. No distress.   HENT:   Head: Normocephalic.   Right Ear: External ear normal.   Left Ear: External ear normal.   Mouth/Throat: No oropharyngeal exudate.   Eyes: Pupils are equal, round, and reactive to light. EOM are normal. Right eye exhibits no discharge. Left eye exhibits no discharge. No scleral icterus.   Neck: Normal range of motion. Neck supple. No JVD present. No tracheal deviation present. No thyromegaly present.   Cardiovascular: Normal rate, regular rhythm, normal heart sounds and intact distal pulses. Exam reveals no gallop and no friction rub.   No murmur heard.  Pulmonary/Chest: Effort normal and breath sounds normal. No stridor. No respiratory distress. He has no wheezes. He has no rales.   Abdominal: Soft. Bowel sounds are normal. He exhibits no distension and no mass. There is no tenderness. There is no guarding.   Neurological: He is alert and oriented to person, place, and time.   Skin: Skin is warm and dry. Capillary refill takes less than 2 seconds. No rash noted. He is not diaphoretic. No erythema. No pallor.   Psychiatric: He has a normal mood and affect. His behavior is normal.   Nursing note and vitals reviewed.      Procedures           ED Course  ED Course as of Aug 13 1732   Thu Aug 13, 2020   1731 Packing removed.  Patient has not had any rebleeding.  Will DC home with antibiotics    [EG]      ED Course User Index  [EG] Olena Love, DO                                           MDM  Number of Diagnoses or Management Options  Bleeding from the nose: minor  Risk of  Complications, Morbidity, and/or Mortality  Presenting problems: low  Diagnostic procedures: low  Management options: low    Patient Progress  Patient progress: stable      Final diagnoses:   Bleeding from the nose            Olena Love DO  08/13/20 1738

## 2020-09-01 ENCOUNTER — OFFICE VISIT (OUTPATIENT)
Dept: CARDIOLOGY | Facility: CLINIC | Age: 80
End: 2020-09-01

## 2020-09-01 VITALS
BODY MASS INDEX: 34.36 KG/M2 | HEART RATE: 63 BPM | WEIGHT: 232 LBS | RESPIRATION RATE: 16 BRPM | DIASTOLIC BLOOD PRESSURE: 79 MMHG | TEMPERATURE: 99.6 F | HEIGHT: 69 IN | SYSTOLIC BLOOD PRESSURE: 140 MMHG

## 2020-09-01 DIAGNOSIS — I10 ESSENTIAL HYPERTENSION: ICD-10-CM

## 2020-09-01 DIAGNOSIS — R94.39 ABNORMAL NUCLEAR STRESS TEST: ICD-10-CM

## 2020-09-01 DIAGNOSIS — I47.29 NONSUSTAINED VENTRICULAR TACHYCARDIA (HCC): Primary | ICD-10-CM

## 2020-09-01 DIAGNOSIS — I47.1 NONSUSTAINED SUPRAVENTRICULAR TACHYCARDIA (HCC): ICD-10-CM

## 2020-09-01 PROCEDURE — 99214 OFFICE O/P EST MOD 30 MIN: CPT | Performed by: INTERNAL MEDICINE

## 2020-09-01 NOTE — PROGRESS NOTES
Ángela Bonilla, APRN  Edward Rodas  1940 09/01/2020    Patient Active Problem List   Diagnosis   • Essential hypertension   • Palpitations   • Near syncope   • Nonsustained supraventricular tachycardia (CMS/HCC)   • Nonsustained ventricular tachycardia (CMS/HCC)   • Abnormal nuclear stress test       Dear Ángela Bonilla, APRN:    Subjective     Edward Rodas is a 80 y.o. male with the problems as listed above, presents    Chief Complaint   Patient presents with   • Results     48hr holter       History of Present Illness: Mr. Rodas is a pleasant 80-year-old  male with history of cardiac arrhythmias and supraventricular ventricular) and abnormal echo stress test.  He is here to review the Holter monitor results for regular cardiology follow-up.  On today's visit he says his palpitations have gotten better since he has been on Toprol-XL 50 mg daily.  His recent Holter monitor revealed frequent PACs with short 3 beat run of SVT but no significant ventricular arrhythmias.  He denies any chest pains or shortness of breath.  Overall his been feeling better.  I have reviewed his blood pressure readings from home which are running around 120s to 130s systolic and 70s to 80s diastolic.    Allergies   Allergen Reactions   • Sulfa Antibiotics Unknown (See Comments)     States lowers blood sugar   • Diprivan [Propofol] Angioedema   • Statins Other (See Comments)     Aches and confusion   • Shellfish-Derived Products Hives and Itching   :      Current Outpatient Medications:   •  albuterol (PROVENTIL) (2.5 MG/3ML) 0.083% nebulizer solution, Take 2.5 mg by nebulization Every 4 (Four) Hours As Needed for Wheezing., Disp: , Rfl:   •  amLODIPine (NORVASC) 5 MG tablet, Take 10 mg by mouth Daily., Disp: , Rfl:   •  aspirin 81 MG EC tablet, Take 81 mg by mouth Daily., Disp: , Rfl:   •  budesonide-formoterol (SYMBICORT) 160-4.5 MCG/ACT inhaler, Inhale 2 puffs 2 (Two) Times a Day., Disp: , Rfl:   •  cefdinir (OMNICEF)  300 MG capsule, Take 1 capsule by mouth 2 (Two) Times a Day., Disp: 20 capsule, Rfl: 0  •  hydrOXYzine (ATARAX) 10 MG tablet, Take 10 mg by mouth As Needed for Itching., Disp: , Rfl:   •  levothyroxine (SYNTHROID, LEVOTHROID) 112 MCG tablet, Take 112 mcg by mouth Daily., Disp: , Rfl:   •  metoprolol succinate XL (TOPROL-XL) 50 MG 24 hr tablet, Take 1.5 tablets by mouth Daily. (Patient taking differently: Take 50 mg by mouth Daily.), Disp: 45 tablet, Rfl: 5  •  valsartan-hydrochlorothiazide (DIOVAN-HCT) 160-12.5 MG per tablet, Take 1 tablet by mouth Daily., Disp: , Rfl:   •  amoxicillin-clavulanate (AUGMENTIN) 875-125 MG per tablet, Take 1 tablet by mouth 2 (Two) Times a Day., Disp: 20 tablet, Rfl: 0  •  MAGNESIUM CITRATE PO, Take 250 mg by mouth Daily., Disp: , Rfl:   •  Omega-3 Fatty Acids (FISH OIL) 1000 MG capsule capsule, Take 2,400 mg by mouth Daily With Breakfast., Disp: , Rfl:   •  triamcinolone (KENALOG) 0.1 % cream, APPLY A THIN FILM TO ARMS THREE TIMES PER DAY, Disp: , Rfl:       The following portions of the patient's history were reviewed and updated as appropriate: allergies, current medications, past family history, past medical history, past social history, past surgical history and problem list.    Social History     Tobacco Use   • Smoking status: Former Smoker     Packs/day: 1.00     Types: Cigarettes     Last attempt to quit: 1958     Years since quittin.7   • Smokeless tobacco: Never Used   Substance Use Topics   • Alcohol use: No   • Drug use: No       Review of Systems   Constitution: Negative for chills and fever.   HENT: Negative for nosebleeds and sore throat.    Cardiovascular: Positive for leg swelling. Negative for chest pain and palpitations.   Respiratory: Positive for shortness of breath. Negative for cough, hemoptysis and wheezing.    Gastrointestinal: Negative for abdominal pain, hematemesis, hematochezia, melena, nausea and vomiting.   Genitourinary: Negative for dysuria and  "hematuria.   Neurological: Negative for headaches.       Objective   Vitals:    20 1553   BP: 140/79   Pulse: 63   Resp: 16   Temp: 99.6 °F (37.6 °C)   Weight: 105 kg (232 lb)   Height: 175.3 cm (69\")     Body mass index is 34.26 kg/m².      Physical Exam   Constitutional: He is oriented to person, place, and time. He appears well-developed and well-nourished.   HENT:   Mouth/Throat: Oropharynx is clear and moist.   Eyes: Pupils are equal, round, and reactive to light. EOM are normal.   Neck: Neck supple. No JVD present. No tracheal deviation present. No thyromegaly present.   Cardiovascular: Normal rate, regular rhythm, S1 normal and S2 normal. Exam reveals no gallop and no friction rub.   No murmur heard.  Pulmonary/Chest: Effort normal and breath sounds normal.   Abdominal: Soft. Bowel sounds are normal. He exhibits no mass. There is no tenderness.   Musculoskeletal: Normal range of motion. He exhibits no edema.   Lymphadenopathy:     He has no cervical adenopathy.   Neurological: He is alert and oriented to person, place, and time.   Skin: Skin is warm and dry. No rash noted.   Psychiatric: He has a normal mood and affect.         Edward Rodas   Holter MOnitor - 48 Hour -  Order# [id number]   Reading physician:   Jae Payan MD Ordering physician:   Jae Payan MD Study date: 20   Patient Information     Patient Name  Edward Rodas MRN  [id number] Sex  Male  (Age)  1940 (80 y.o.)   Interpretation Summary     · The predominant rhythm noted during the testing period was sinus rhythm  · Average HR: 65. Min HR: 48. Max HR: 103.  · Frequent PACs and short 3 beat run of SVT  · No significant bradycardia arrhythmias, AV block or long pauses.  · Sinoatrial node conduction was normal. No atrioventricular block noted.  · Symptoms of pulse increased and cough did not correspond to any cardiac arrhythmias or diagnostic EKG changes.          Lab Results   Component Value Date     2020 "    K 3.8 08/03/2020     08/03/2020    CO2 21.8 (L) 08/03/2020    BUN 16 08/03/2020    CREATININE 1.14 08/03/2020    GLUCOSE 165 (H) 08/03/2020    CALCIUM 9.1 08/03/2020    AST 15 08/03/2020    ALT 17 08/03/2020    ALKPHOS 97 08/03/2020     No results found for: CKTOTAL  Lab Results   Component Value Date    WBC 15.98 (H) 08/03/2020    HGB 16.2 08/03/2020    HCT 49.1 08/03/2020     08/03/2020     Lab Results   Component Value Date    INR 1.03 08/03/2020    INR 0.90 02/23/2020    INR 0.94 07/20/2019     Lab Results   Component Value Date    MG 2.3 09/11/2019     Lab Results   Component Value Date    TSH 0.401 07/20/2019    TRIG 112 08/08/2018    HDL 34 (L) 08/08/2018     (H) 08/08/2018        Assessment/Plan :   Diagnosis Plan   1. Nonsustained ventricular tachycardia (CMS/HCC)     2. Nonsustained supraventricular tachycardia (CMS/HCC)     3. Abnormal nuclear stress test with mild degree of small size inferior wall myocardial ischemia, clinically asymptomatic and stable..     4. Essential hypertension, controlled.          Recommendations:  I have reviewed the Holter monitor results with the patient.  Continue with Toprol-XL at 50 mg daily.    Return in about 5 months (around 2/1/2021) for or sooner if needed.    As always, Ángela   I appreciate very much the opportunity to participate in the cardiovascular care of your patients. Please do not hesitate to call me with any questions with regards to Edward Rodas 'sevaluation and management.       With Best Regards,        Jae Payan MD, FACC    Please note that portions of this note were completed with a voice recognition program.

## 2020-09-10 ENCOUNTER — HOSPITAL ENCOUNTER (OUTPATIENT)
Dept: GENERAL RADIOLOGY | Facility: HOSPITAL | Age: 80
Discharge: HOME | End: 2020-09-10
Admitting: OTOLARYNGOLOGY

## 2020-09-10 ENCOUNTER — TRANSCRIBE ORDERS (OUTPATIENT)
Dept: ADMINISTRATIVE | Facility: HOSPITAL | Age: 80
End: 2020-09-10

## 2020-09-10 DIAGNOSIS — R05.9 COUGH: Primary | ICD-10-CM

## 2020-09-10 DIAGNOSIS — R05.9 COUGH: ICD-10-CM

## 2020-09-10 PROCEDURE — 71046 X-RAY EXAM CHEST 2 VIEWS: CPT

## 2020-09-10 PROCEDURE — 71046 X-RAY EXAM CHEST 2 VIEWS: CPT | Performed by: RADIOLOGY

## 2021-03-17 ENCOUNTER — OFFICE VISIT (OUTPATIENT)
Dept: CARDIOLOGY | Facility: CLINIC | Age: 81
End: 2021-03-17

## 2021-03-17 VITALS
OXYGEN SATURATION: 94 % | HEIGHT: 69 IN | BODY MASS INDEX: 33 KG/M2 | HEART RATE: 74 BPM | WEIGHT: 222.8 LBS | TEMPERATURE: 98.8 F | DIASTOLIC BLOOD PRESSURE: 89 MMHG | SYSTOLIC BLOOD PRESSURE: 177 MMHG

## 2021-03-17 DIAGNOSIS — I10 ESSENTIAL HYPERTENSION: ICD-10-CM

## 2021-03-17 DIAGNOSIS — R27.0 ATAXIA: ICD-10-CM

## 2021-03-17 DIAGNOSIS — I47.29 NONSUSTAINED VENTRICULAR TACHYCARDIA (HCC): Primary | ICD-10-CM

## 2021-03-17 DIAGNOSIS — R00.2 PALPITATIONS: ICD-10-CM

## 2021-03-17 DIAGNOSIS — I47.1 NONSUSTAINED SUPRAVENTRICULAR TACHYCARDIA (HCC): ICD-10-CM

## 2021-03-17 DIAGNOSIS — R94.39 ABNORMAL NUCLEAR STRESS TEST: ICD-10-CM

## 2021-03-17 PROCEDURE — 93000 ELECTROCARDIOGRAM COMPLETE: CPT | Performed by: INTERNAL MEDICINE

## 2021-03-17 PROCEDURE — 99214 OFFICE O/P EST MOD 30 MIN: CPT | Performed by: INTERNAL MEDICINE

## 2021-03-17 RX ORDER — VALSARTAN AND HYDROCHLOROTHIAZIDE 160; 12.5 MG/1; MG/1
1 TABLET, FILM COATED ORAL DAILY
Qty: 90 TABLET | Refills: 2 | Status: SHIPPED | OUTPATIENT
Start: 2021-03-17 | End: 2022-11-26 | Stop reason: HOSPADM

## 2021-03-17 RX ORDER — AMLODIPINE BESYLATE 5 MG/1
10 TABLET ORAL DAILY
Qty: 90 TABLET | Refills: 2 | Status: ON HOLD | OUTPATIENT
Start: 2021-03-17 | End: 2021-08-25

## 2021-03-17 RX ORDER — METOPROLOL SUCCINATE 50 MG/1
50 TABLET, EXTENDED RELEASE ORAL DAILY
Qty: 90 TABLET | Refills: 2 | Status: SHIPPED | OUTPATIENT
Start: 2021-03-17 | End: 2021-04-05 | Stop reason: SDUPTHER

## 2021-03-17 NOTE — PROGRESS NOTES
Ángela Bonilla, APRN  Edward Rodas  1940 03/17/2021    Patient Active Problem List   Diagnosis   • Essential hypertension   • Palpitations   • Near syncope   • Nonsustained supraventricular tachycardia (CMS/HCC)   • Nonsustained ventricular tachycardia (CMS/HCC)   • Abnormal nuclear stress test       DeaÁngela Petersen, APRN:    Subjective     Edward Rodas is a 81 y.o. male with the problems as listed above, presents    Chief complaint: Follow-up of supraventricular and ventricular arrhythmias as well as an abnormal nuclear stress test.    History of Present Illness: Ms. Rodas is a pleasant 80-year-old  male with history of nonsustained supraventricular ventricle tachycardia and previous history of chest pains with abnormal nuclear stress test with mild degree of small size inferior wall myocardial ischemia.  He is here for regular cardiology follow-up.  On today's visit he denies any complaints of significant palpitations and states in fact has been doing much better with the palpitations since the last visit.  He states he had to rush to the office and hence his blood pressure is running high here.  Repeat blood pressure reading was 166/87 mmHg.  He has chronic dyspnea with mild to moderate exertion which he attributes to COPD.  Denies any chest pains.  He states his blood pressure at home runs around 130s over 80s.  He complains of intermittent staggering and loss of balance.  No complaints of headaches.  He denies any other focal neurologic symptoms such as focal weakness or facial asymmetry or double vision etc.      Allergies   Allergen Reactions   • Sulfa Antibiotics Unknown (See Comments)     States lowers blood sugar   • Diprivan [Propofol] Angioedema   • Statins Other (See Comments)     Aches and confusion   • Shellfish-Derived Products Hives and Itching   :      Current Outpatient Medications:   •  albuterol (PROVENTIL) (2.5 MG/3ML) 0.083% nebulizer solution, Take 2.5 mg by  "nebulization Every 4 (Four) Hours As Needed for Wheezing., Disp: , Rfl:   •  amLODIPine (NORVASC) 5 MG tablet, Take 2 tablets by mouth Daily., Disp: 90 tablet, Rfl: 2  •  budesonide-formoterol (SYMBICORT) 160-4.5 MCG/ACT inhaler, Inhale 2 puffs 2 (Two) Times a Day., Disp: , Rfl:   •  hydrOXYzine (ATARAX) 10 MG tablet, Take 10 mg by mouth As Needed for Itching., Disp: , Rfl:   •  levothyroxine (SYNTHROID, LEVOTHROID) 112 MCG tablet, Take 112 mcg by mouth Daily., Disp: , Rfl:   •  MAGNESIUM CITRATE PO, Take 250 mg by mouth Daily., Disp: , Rfl:   •  metoprolol succinate XL (TOPROL-XL) 50 MG 24 hr tablet, Take 1 tablet by mouth Daily., Disp: 90 tablet, Rfl: 2  •  Omega-3 Fatty Acids (FISH OIL) 1000 MG capsule capsule, Take 2,400 mg by mouth Daily With Breakfast., Disp: , Rfl:   •  triamcinolone (KENALOG) 0.1 % cream, APPLY A THIN FILM TO ARMS THREE TIMES PER DAY, Disp: , Rfl:   •  valsartan-hydrochlorothiazide (DIOVAN-HCT) 160-12.5 MG per tablet, Take 1 tablet by mouth Daily., Disp: 90 tablet, Rfl: 2  •  aspirin 81 MG EC tablet, Take 81 mg by mouth Daily., Disp: , Rfl:       The following portions of the patient's history were reviewed and updated as appropriate: allergies, current medications, past family history, past medical history, past social history, past surgical history and problem list.    Social History     Tobacco Use   • Smoking status: Former Smoker     Packs/day: 1.00     Types: Cigarettes     Quit date:      Years since quittin.2   • Smokeless tobacco: Never Used   Substance Use Topics   • Alcohol use: No   • Drug use: No       Review of Systems   Cardiovascular: Negative for chest pain and palpitations.   Respiratory: Positive for shortness of breath.    Neurological: Negative for dizziness.       Objective   Vitals:    21 1439   BP: 177/89   Pulse: 74   Temp: 98.8 °F (37.1 °C)   SpO2: 94%   Weight: 101 kg (222 lb 12.8 oz)   Height: 175.3 cm (69\")     Body mass index is 32.9 " kg/m².    Vitals reviewed.   Constitutional:       Appearance: Well-developed.   Eyes:      Conjunctiva/sclera: Conjunctivae normal.   HENT:      Head: Normocephalic.   Neck:      Thyroid: No thyromegaly.      Vascular: No JVD.      Trachea: No tracheal deviation.   Pulmonary:      Effort: No respiratory distress.      Breath sounds: Normal breath sounds. No wheezing. No rales.   Cardiovascular:      PMI at left midclavicular line. Normal rate. Regular rhythm. Normal S1. Normal S2.      Murmurs: There is no murmur.      No gallop. No click. No rub.   Pulses:     Intact distal pulses.   Edema:     Peripheral edema absent.   Abdominal:      General: Bowel sounds are normal.      Palpations: Abdomen is soft. There is no abdominal mass.      Tenderness: There is no abdominal tenderness.   Musculoskeletal:      Cervical back: Normal range of motion and neck supple. Skin:     General: Skin is warm and dry.   Neurological:      Mental Status: Alert and oriented to person, place, and time.      Cranial Nerves: No cranial nerve deficit.         Lab Results   Component Value Date     08/03/2020    K 3.8 08/03/2020     08/03/2020    CO2 21.8 (L) 08/03/2020    BUN 16 08/03/2020    CREATININE 1.14 08/03/2020    GLUCOSE 165 (H) 08/03/2020    CALCIUM 9.1 08/03/2020    AST 15 08/03/2020    ALT 17 08/03/2020    ALKPHOS 97 08/03/2020     No results found for: CKTOTAL  Lab Results   Component Value Date    WBC 15.98 (H) 08/03/2020    HGB 16.2 08/03/2020    HCT 49.1 08/03/2020     08/03/2020     Lab Results   Component Value Date    INR 1.03 08/03/2020    INR 0.90 02/23/2020    INR 0.94 07/20/2019     Lab Results   Component Value Date    MG 2.3 09/11/2019     Lab Results   Component Value Date    TSH 0.401 07/20/2019    TRIG 112 08/08/2018    HDL 34 (L) 08/08/2018     (H) 08/08/2018        ECG 12 Lead    Date/Time: 3/17/2021 2:40 PM  Performed by: Jae Payan MD  Authorized by: Jae Payan MD    Comparison: compared with previous ECG from 8/3/2020  Similar to previous ECG  Rhythm: sinus rhythm  BPM: 68  Conduction: conduction normal  ST Segments: ST segments normal                Assessment/Plan :   Diagnosis Plan   1. Nonsustained ventricular tachycardia (CMS/HCC)     2. Nonsustained supraventricular tachycardia (CMS/HCC)     3. Abnormal nuclear stress test with mild degree of small size inferior wall myocardial ischemia, clinically asymptomatic and stable..     4. Essential hypertension, controlled at home.     5. Palpitations, improved.     6. Ataxia         Recommendations:  1. Continue with aspirin, metoprolol succinate, Diovan HCT and amlodipine at current doses.  2. Continue keep a check on the blood pressure at home and call if it is running more than 140 on the top and 90 on the bottom.  3. Will refer to neurologist for his intermittent episodes of ataxia.    Return in about 4 months (around 7/17/2021).    As always, Ángela Bonilla, APRN  I appreciate very much the opportunity to participate in the cardiovascular care of your patients. Please do not hesitate to call me with any questions with regards to Edward Rodas's evaluation and management.       With Best Regards,        Jae Payan MD, [facility]    Please note that portions of this note were completed with a voice recognition program.

## 2021-04-05 RX ORDER — METOPROLOL SUCCINATE 50 MG/1
50 TABLET, EXTENDED RELEASE ORAL DAILY
Qty: 90 TABLET | Refills: 2 | Status: SHIPPED | OUTPATIENT
Start: 2021-04-05 | End: 2021-04-07 | Stop reason: SDUPTHER

## 2021-04-05 NOTE — TELEPHONE ENCOUNTER
Patient needs metoprolol xl 50mg. Patient needs a 90 day supply sent in to [facility] in [place].     Thanks!

## 2021-04-07 RX ORDER — METOPROLOL SUCCINATE 50 MG/1
50 TABLET, EXTENDED RELEASE ORAL DAILY
Qty: 90 TABLET | Refills: 2 | Status: SHIPPED | OUTPATIENT
Start: 2021-04-07 | End: 2021-12-23 | Stop reason: SDUPTHER

## 2021-04-07 RX ORDER — METOPROLOL SUCCINATE 50 MG/1
50 TABLET, EXTENDED RELEASE ORAL DAILY
Qty: 90 TABLET | Refills: 2 | Status: SHIPPED | OUTPATIENT
Start: 2021-04-07 | End: 2021-04-07 | Stop reason: SDUPTHER

## 2021-07-13 ENCOUNTER — OFFICE VISIT (OUTPATIENT)
Dept: CARDIOLOGY | Facility: CLINIC | Age: 81
End: 2021-07-13

## 2021-07-13 VITALS
RESPIRATION RATE: 16 BRPM | TEMPERATURE: 97.3 F | WEIGHT: 221.4 LBS | HEART RATE: 68 BPM | DIASTOLIC BLOOD PRESSURE: 85 MMHG | BODY MASS INDEX: 32.79 KG/M2 | SYSTOLIC BLOOD PRESSURE: 154 MMHG | HEIGHT: 69 IN

## 2021-07-13 DIAGNOSIS — I47.29 NONSUSTAINED VENTRICULAR TACHYCARDIA (HCC): Primary | ICD-10-CM

## 2021-07-13 DIAGNOSIS — I10 ESSENTIAL HYPERTENSION: ICD-10-CM

## 2021-07-13 DIAGNOSIS — R94.39 ABNORMAL NUCLEAR STRESS TEST: ICD-10-CM

## 2021-07-13 DIAGNOSIS — I47.1 NONSUSTAINED SUPRAVENTRICULAR TACHYCARDIA (HCC): ICD-10-CM

## 2021-07-13 DIAGNOSIS — R00.2 PALPITATIONS: ICD-10-CM

## 2021-07-13 PROCEDURE — 99213 OFFICE O/P EST LOW 20 MIN: CPT | Performed by: INTERNAL MEDICINE

## 2021-07-13 NOTE — PROGRESS NOTES
Edward Rodas  1940 07/13/2021    Patient Active Problem List   Diagnosis   • Essential hypertension   • Palpitations   • Near syncope   • Nonsustained supraventricular tachycardia (CMS/HCC)   • Nonsustained ventricular tachycardia (CMS/HCC)   • Abnormal nuclear stress test       Ángela Santana, APRN:    Subjective     Edward Rodas is a 81 y.o. male with the problems as listed above, presents    Chief complaint: Follow-up of supraventricular and ventricular arrhythmias.    History of Present Illness: Mr. Rodas is a pleasant 81-year-old  male with history of some supraventricular and ventricular arrhythmias noted on the monitor as well as an abnormal nuclear stress test.  He is here for regular cardiology follow-up.  On today's visit denies any complaints of chest pains or shortness of breath or palpitations.  Overall he has been doing pretty good except for some intermittent stress at home.  He brought blood pressure readings from home which I reviewed and they are mostly in the range of 120s to 130s over 80s and occasionally up to 140 systolic.  He says dizziness and vertigo are also much better since he started doing some exercises.      Allergies   Allergen Reactions   • Sulfa Antibiotics Unknown (See Comments)     States lowers blood sugar   • Diprivan [Propofol] Angioedema   • Statins Other (See Comments)     Aches and confusion   • Shellfish-Derived Products Hives and Itching   :      Current Outpatient Medications:   •  albuterol (PROVENTIL) (2.5 MG/3ML) 0.083% nebulizer solution, Take 2.5 mg by nebulization Every 4 (Four) Hours As Needed for Wheezing., Disp: , Rfl:   •  amLODIPine (NORVASC) 5 MG tablet, Take 2 tablets by mouth Daily., Disp: 90 tablet, Rfl: 2  •  budesonide-formoterol (SYMBICORT) 160-4.5 MCG/ACT inhaler, Inhale 2 puffs 2 (Two) Times a Day., Disp: , Rfl:   •  hydrOXYzine (ATARAX) 10 MG tablet, Take 10 mg by mouth As Needed for Itching., Disp: , Rfl:   •  levothyroxine  "(SYNTHROID, LEVOTHROID) 112 MCG tablet, Take 112 mcg by mouth Daily., Disp: , Rfl:   •  MAGNESIUM CITRATE PO, Take 250 mg by mouth Daily., Disp: , Rfl:   •  metoprolol succinate XL (TOPROL-XL) 50 MG 24 hr tablet, Take 1 tablet by mouth Daily., Disp: 90 tablet, Rfl: 2  •  triamcinolone (KENALOG) 0.1 % cream, APPLY A THIN FILM TO ARMS THREE TIMES PER DAY, Disp: , Rfl:   •  valsartan-hydrochlorothiazide (DIOVAN-HCT) 160-12.5 MG per tablet, Take 1 tablet by mouth Daily., Disp: 90 tablet, Rfl: 2  •  aspirin 81 MG EC tablet, Take 81 mg by mouth Daily., Disp: , Rfl:   •  Omega-3 Fatty Acids (FISH OIL) 1000 MG capsule capsule, Take 2,400 mg by mouth Daily With Breakfast., Disp: , Rfl:       The following portions of the patient's history were reviewed and updated as appropriate: allergies, current medications, past family history, past medical history, past social history, past surgical history and problem list.    Social History     Tobacco Use   • Smoking status: Former Smoker     Packs/day: 1.00     Types: Cigarettes     Quit date:      Years since quittin.5   • Smokeless tobacco: Never Used   Substance Use Topics   • Alcohol use: No   • Drug use: No       Review of Systems   Cardiovascular: Negative for chest pain, leg swelling and palpitations.   Respiratory: Positive for shortness of breath.      Objective   Vitals:    21 1528   BP: 154/85   Pulse: 68   Resp: 16   Temp: 97.3 °F (36.3 °C)   Weight: 100 kg (221 lb 6.4 oz)   Height: 175.3 cm (69\")     Body mass index is 32.7 kg/m².    Vitals reviewed.   Constitutional:       Appearance: Well-developed.   Eyes:      Conjunctiva/sclera: Conjunctivae normal.   HENT:      Head: Normocephalic.   Neck:      Thyroid: No thyromegaly.      Vascular: No JVD.      Trachea: No tracheal deviation.   Pulmonary:      Effort: No respiratory distress.      Breath sounds: Normal breath sounds. No wheezing. No rales.   Cardiovascular:      PMI at left midclavicular line. " Normal rate. Regular rhythm. Normal S1. Normal S2.      Murmurs: There is no murmur.      No gallop. No click. No rub.   Pulses:     Intact distal pulses.   Edema:     Peripheral edema absent.   Abdominal:      General: Bowel sounds are normal.      Palpations: Abdomen is soft. There is no abdominal mass.      Tenderness: There is no abdominal tenderness.   Musculoskeletal:      Cervical back: Normal range of motion and neck supple. Skin:     General: Skin is warm and dry.   Neurological:      Mental Status: Alert and oriented to person, place, and time.      Cranial Nerves: No cranial nerve deficit.       Lab Results   Component Value Date     08/03/2020    K 3.8 08/03/2020     08/03/2020    CO2 21.8 (L) 08/03/2020    BUN 16 08/03/2020    CREATININE 1.14 08/03/2020    GLUCOSE 165 (H) 08/03/2020    CALCIUM 9.1 08/03/2020    AST 15 08/03/2020    ALT 17 08/03/2020    ALKPHOS 97 08/03/2020     No results found for: CKTOTAL  Lab Results   Component Value Date    WBC 15.98 (H) 08/03/2020    HGB 16.2 08/03/2020    HCT 49.1 08/03/2020     08/03/2020     Lab Results   Component Value Date    INR 1.03 08/03/2020    INR 0.90 02/23/2020    INR 0.94 07/20/2019     Lab Results   Component Value Date    MG 2.3 09/11/2019     Lab Results   Component Value Date    TSH 0.401 07/20/2019    TRIG 112 08/08/2018    HDL 34 (L) 08/08/2018     (H) 08/08/2018        Assessment/Plan :   Diagnosis Plan   1. Nonsustained ventricular tachycardia (CMS/HCC)     2. Nonsustained supraventricular tachycardia (CMS/HCC)     3. Abnormal nuclear stress test with mild degree of small size inferior wall myocardial ischemia, clinically asymptomatic and stable..     4. Palpitations, improved.     5. Essential hypertension, controlled at home.         Recommendations:  1. Continue with metoprolol XL at current doses.    Return in about 5 months (around 12/13/2021).    As always, Ángela Bonilla, [facility]  I appreciate very much the  opportunity to participate in the cardiovascular care of your patients. Please do not hesitate to call me with any questions with regards to Edward Rodas evaluation and management.       With Best Regards,        Jae Payan MD, FACC    Please note that portions of this note were completed with a voice recognition program.

## 2021-08-24 ENCOUNTER — APPOINTMENT (OUTPATIENT)
Dept: GENERAL RADIOLOGY | Facility: HOSPITAL | Age: 81
End: 2021-08-24

## 2021-08-24 ENCOUNTER — APPOINTMENT (OUTPATIENT)
Dept: CT IMAGING | Facility: HOSPITAL | Age: 81
End: 2021-08-24

## 2021-08-24 ENCOUNTER — HOSPITAL ENCOUNTER (INPATIENT)
Facility: HOSPITAL | Age: 81
LOS: 10 days | Discharge: HOME | End: 2021-09-04
Attending: EMERGENCY MEDICINE | Admitting: STUDENT IN AN ORGANIZED HEALTH CARE EDUCATION/TRAINING PROGRAM

## 2021-08-24 DIAGNOSIS — U07.1 PNEUMONIA DUE TO COVID-19 VIRUS: Primary | ICD-10-CM

## 2021-08-24 DIAGNOSIS — J12.82 PNEUMONIA DUE TO COVID-19 VIRUS: Primary | ICD-10-CM

## 2021-08-24 DIAGNOSIS — R09.02 HYPOXIA: ICD-10-CM

## 2021-08-24 DIAGNOSIS — E87.1 HYPONATREMIA: ICD-10-CM

## 2021-08-24 DIAGNOSIS — U07.1 ACUTE HYPOXEMIC RESPIRATORY FAILURE DUE TO COVID-19 (HCC): ICD-10-CM

## 2021-08-24 DIAGNOSIS — J96.01 ACUTE HYPOXEMIC RESPIRATORY FAILURE DUE TO COVID-19 (HCC): ICD-10-CM

## 2021-08-24 LAB
A-A DO2: 59.9 MMHG (ref 0–300)
ALBUMIN SERPL-MCNC: 3.25 G/DL (ref 3.5–5.2)
ALBUMIN/GLOB SERPL: 1 G/DL
ALP SERPL-CCNC: 73 U/L (ref 39–117)
ALT SERPL W P-5'-P-CCNC: 23 U/L (ref 1–41)
ANION GAP SERPL CALCULATED.3IONS-SCNC: 11.4 MMOL/L (ref 5–15)
ARTERIAL PATENCY WRIST A: POSITIVE
AST SERPL-CCNC: 34 U/L (ref 1–40)
ATMOSPHERIC PRESS: 728 MMHG
BASE EXCESS BLDA CALC-SCNC: 2.6 MMOL/L (ref 0–2)
BDY SITE: ABNORMAL
BILIRUB SERPL-MCNC: 0.5 MG/DL (ref 0–1.2)
BODY TEMPERATURE: 0 C
BUN SERPL-MCNC: 16 MG/DL (ref 8–23)
BUN/CREAT SERPL: 12.4 (ref 7–25)
CALCIUM SPEC-SCNC: 8.4 MG/DL (ref 8.6–10.5)
CHLORIDE SERPL-SCNC: 92 MMOL/L (ref 98–107)
CO2 BLDA-SCNC: 26.3 MMOL/L (ref 22–33)
CO2 SERPL-SCNC: 23.6 MMOL/L (ref 22–29)
COHGB MFR BLD: 0.8 % (ref 0–5)
CREAT SERPL-MCNC: 1.29 MG/DL (ref 0.76–1.27)
CRP SERPL-MCNC: 9.82 MG/DL (ref 0–0.5)
D-LACTATE SERPL-SCNC: 1.1 MMOL/L (ref 0.5–2)
DEPRECATED RDW RBC AUTO: 44 FL (ref 37–54)
ERYTHROCYTE [DISTWIDTH] IN BLOOD BY AUTOMATED COUNT: 13.8 % (ref 12.3–15.4)
FERRITIN SERPL-MCNC: 792.1 NG/ML (ref 30–400)
GFR SERPL CREATININE-BSD FRML MDRD: 53 ML/MIN/1.73
GLOBULIN UR ELPH-MCNC: 3.4 GM/DL
GLUCOSE SERPL-MCNC: 110 MG/DL (ref 65–99)
HCO3 BLDA-SCNC: 25.3 MMOL/L (ref 20–26)
HCT VFR BLD AUTO: 46 % (ref 37.5–51)
HCT VFR BLD CALC: 50.9 % (ref 38–51)
HGB BLD-MCNC: 15.8 G/DL (ref 13–17.7)
HGB BLDA-MCNC: 16.6 G/DL (ref 14–18)
INHALED O2 CONCENTRATION: 21 %
LDH SERPL-CCNC: 353 U/L (ref 135–225)
LYMPHOCYTES # BLD MANUAL: 5.23 10*3/MM3 (ref 0.7–3.1)
LYMPHOCYTES NFR BLD MANUAL: 46 % (ref 19.6–45.3)
LYMPHOCYTES NFR BLD MANUAL: 9 % (ref 5–12)
Lab: ABNORMAL
Lab: ABNORMAL
MCH RBC QN AUTO: 30 PG (ref 26.6–33)
MCHC RBC AUTO-ENTMCNC: 34.3 G/DL (ref 31.5–35.7)
MCV RBC AUTO: 87.3 FL (ref 79–97)
METHGB BLD QL: 0.1 % (ref 0–3)
MODALITY: ABNORMAL
MONOCYTES # BLD AUTO: 1.02 10*3/MM3 (ref 0.1–0.9)
NEUTROPHILS # BLD AUTO: 5.11 10*3/MM3 (ref 1.7–7)
NEUTROPHILS NFR BLD MANUAL: 44 % (ref 42.7–76)
NEUTS BAND NFR BLD MANUAL: 1 % (ref 0–5)
NOTE: ABNORMAL
NOTIFIED BY: ABNORMAL
NOTIFIED WHO: ABNORMAL
OXYHGB MFR BLDV: 86.2 % (ref 94–99)
PCO2 BLDA: 32.9 MM HG (ref 35–45)
PCO2 TEMP ADJ BLD: ABNORMAL MM[HG]
PH BLDA: 7.49 PH UNITS (ref 7.35–7.45)
PH, TEMP CORRECTED: ABNORMAL
PLATELET # BLD AUTO: 145 10*3/MM3 (ref 140–450)
PMV BLD AUTO: 10 FL (ref 6–12)
PO2 BLDA: 46.1 MM HG (ref 83–108)
PO2 TEMP ADJ BLD: ABNORMAL MM[HG]
POTASSIUM SERPL-SCNC: 3.4 MMOL/L (ref 3.5–5.2)
PROT SERPL-MCNC: 6.6 G/DL (ref 6–8.5)
RBC # BLD AUTO: 5.27 10*6/MM3 (ref 4.14–5.8)
RBC MORPH BLD: NORMAL
SAO2 % BLDCOA: 87 % (ref 94–99)
SCAN SLIDE: NORMAL
SMALL PLATELETS BLD QL SMEAR: ADEQUATE
SODIUM SERPL-SCNC: 127 MMOL/L (ref 136–145)
VENTILATOR MODE: ABNORMAL
WBC # BLD AUTO: 11.36 10*3/MM3 (ref 3.4–10.8)

## 2021-08-24 PROCEDURE — 85025 COMPLETE CBC W/AUTO DIFF WBC: CPT | Performed by: PHYSICIAN ASSISTANT

## 2021-08-24 PROCEDURE — 99285 EMERGENCY DEPT VISIT HI MDM: CPT

## 2021-08-24 PROCEDURE — 83050 HGB METHEMOGLOBIN QUAN: CPT

## 2021-08-24 PROCEDURE — 71045 X-RAY EXAM CHEST 1 VIEW: CPT | Performed by: RADIOLOGY

## 2021-08-24 PROCEDURE — 82805 BLOOD GASES W/O2 SATURATION: CPT

## 2021-08-24 PROCEDURE — 86140 C-REACTIVE PROTEIN: CPT | Performed by: PHYSICIAN ASSISTANT

## 2021-08-24 PROCEDURE — 84145 PROCALCITONIN (PCT): CPT | Performed by: PHYSICIAN ASSISTANT

## 2021-08-24 PROCEDURE — 25010000002 DEXAMETHASONE PER 1 MG: Performed by: PHYSICIAN ASSISTANT

## 2021-08-24 PROCEDURE — 83615 LACTATE (LD) (LDH) ENZYME: CPT | Performed by: PHYSICIAN ASSISTANT

## 2021-08-24 PROCEDURE — 71045 X-RAY EXAM CHEST 1 VIEW: CPT

## 2021-08-24 PROCEDURE — 80053 COMPREHEN METABOLIC PANEL: CPT | Performed by: PHYSICIAN ASSISTANT

## 2021-08-24 PROCEDURE — 71250 CT THORAX DX C-: CPT | Performed by: RADIOLOGY

## 2021-08-24 PROCEDURE — 36600 WITHDRAWAL OF ARTERIAL BLOOD: CPT

## 2021-08-24 PROCEDURE — 82375 ASSAY CARBOXYHB QUANT: CPT

## 2021-08-24 PROCEDURE — 83605 ASSAY OF LACTIC ACID: CPT | Performed by: PHYSICIAN ASSISTANT

## 2021-08-24 PROCEDURE — 82728 ASSAY OF FERRITIN: CPT | Performed by: STUDENT IN AN ORGANIZED HEALTH CARE EDUCATION/TRAINING PROGRAM

## 2021-08-24 PROCEDURE — 84443 ASSAY THYROID STIM HORMONE: CPT | Performed by: PHYSICIAN ASSISTANT

## 2021-08-24 PROCEDURE — 82728 ASSAY OF FERRITIN: CPT | Performed by: PHYSICIAN ASSISTANT

## 2021-08-24 PROCEDURE — 71250 CT THORAX DX C-: CPT

## 2021-08-24 PROCEDURE — 85007 BL SMEAR W/DIFF WBC COUNT: CPT | Performed by: PHYSICIAN ASSISTANT

## 2021-08-24 RX ORDER — ACETAMINOPHEN 500 MG
1000 TABLET ORAL ONCE
Status: COMPLETED | OUTPATIENT
Start: 2021-08-24 | End: 2021-08-24

## 2021-08-24 RX ORDER — IBUPROFEN 400 MG/1
800 TABLET ORAL ONCE
Status: COMPLETED | OUTPATIENT
Start: 2021-08-24 | End: 2021-08-24

## 2021-08-24 RX ORDER — DEXAMETHASONE SODIUM PHOSPHATE 4 MG/ML
6 INJECTION, SOLUTION INTRA-ARTICULAR; INTRALESIONAL; INTRAMUSCULAR; INTRAVENOUS; SOFT TISSUE ONCE
Status: COMPLETED | OUTPATIENT
Start: 2021-08-24 | End: 2021-08-24

## 2021-08-24 RX ADMIN — IBUPROFEN 800 MG: 400 TABLET, FILM COATED ORAL at 17:48

## 2021-08-24 RX ADMIN — DEXAMETHASONE SODIUM PHOSPHATE 6 MG: 4 INJECTION, SOLUTION INTRA-ARTICULAR; INTRALESIONAL; INTRAMUSCULAR; INTRAVENOUS; SOFT TISSUE at 23:10

## 2021-08-24 RX ADMIN — ACETAMINOPHEN 1000 MG: 500 TABLET ORAL at 19:09

## 2021-08-25 PROBLEM — U07.1 ACUTE HYPOXEMIC RESPIRATORY FAILURE DUE TO COVID-19: Status: ACTIVE | Noted: 2021-08-25

## 2021-08-25 PROBLEM — U07.1 PNEUMONIA DUE TO COVID-19 VIRUS: Status: ACTIVE | Noted: 2021-08-25

## 2021-08-25 PROBLEM — J12.82 PNEUMONIA DUE TO COVID-19 VIRUS: Status: ACTIVE | Noted: 2021-08-25

## 2021-08-25 PROBLEM — J96.01 ACUTE HYPOXEMIC RESPIRATORY FAILURE DUE TO COVID-19: Status: ACTIVE | Noted: 2021-08-25

## 2021-08-25 LAB
BACTERIA UR QL AUTO: NORMAL /HPF
BILIRUB UR QL STRIP: NEGATIVE
CLARITY UR: CLEAR
COLOR UR: YELLOW
CRP SERPL-MCNC: 8.91 MG/DL (ref 0–0.5)
D DIMER PPP FEU-MCNC: 0.94 MCGFEU/ML (ref 0–0.5)
FERRITIN SERPL-MCNC: 764 NG/ML (ref 30–400)
GLUCOSE UR STRIP-MCNC: NEGATIVE MG/DL
HGB UR QL STRIP.AUTO: ABNORMAL
HYALINE CASTS UR QL AUTO: NORMAL /LPF
KETONES UR QL STRIP: NEGATIVE
L PNEUMO1 AG UR QL IA: NEGATIVE
LDH SERPL-CCNC: 349 U/L (ref 135–225)
LEUKOCYTE ESTERASE UR QL STRIP.AUTO: NEGATIVE
MAGNESIUM SERPL-MCNC: 2.2 MG/DL (ref 1.6–2.4)
NITRITE UR QL STRIP: NEGATIVE
PH UR STRIP.AUTO: 5.5 [PH] (ref 5–8)
PROCALCITONIN SERPL-MCNC: 0.16 NG/ML (ref 0–0.25)
PROT UR QL STRIP: ABNORMAL
QT INTERVAL: 366 MS
QTC INTERVAL: 445 MS
RBC # UR: NORMAL /HPF
REF LAB TEST METHOD: NORMAL
SP GR UR STRIP: 1.01 (ref 1–1.03)
SQUAMOUS #/AREA URNS HPF: NORMAL /HPF
TSH SERPL DL<=0.05 MIU/L-ACNC: 0.84 UIU/ML (ref 0.27–4.2)
UROBILINOGEN UR QL STRIP: ABNORMAL
WBC UR QL AUTO: NORMAL /HPF

## 2021-08-25 PROCEDURE — 25010000002 ENOXAPARIN PER 10 MG: Performed by: STUDENT IN AN ORGANIZED HEALTH CARE EDUCATION/TRAINING PROGRAM

## 2021-08-25 PROCEDURE — XW033E5 INTRODUCTION OF REMDESIVIR ANTI-INFECTIVE INTO PERIPHERAL VEIN, PERCUTANEOUS APPROACH, NEW TECHNOLOGY GROUP 5: ICD-10-PCS | Performed by: INTERNAL MEDICINE

## 2021-08-25 PROCEDURE — 85379 FIBRIN DEGRADATION QUANT: CPT | Performed by: STUDENT IN AN ORGANIZED HEALTH CARE EDUCATION/TRAINING PROGRAM

## 2021-08-25 PROCEDURE — 81001 URINALYSIS AUTO W/SCOPE: CPT | Performed by: PHYSICIAN ASSISTANT

## 2021-08-25 PROCEDURE — 99223 1ST HOSP IP/OBS HIGH 75: CPT | Performed by: PHYSICIAN ASSISTANT

## 2021-08-25 PROCEDURE — 87899 AGENT NOS ASSAY W/OPTIC: CPT | Performed by: STUDENT IN AN ORGANIZED HEALTH CARE EDUCATION/TRAINING PROGRAM

## 2021-08-25 PROCEDURE — 86140 C-REACTIVE PROTEIN: CPT | Performed by: STUDENT IN AN ORGANIZED HEALTH CARE EDUCATION/TRAINING PROGRAM

## 2021-08-25 PROCEDURE — 83735 ASSAY OF MAGNESIUM: CPT | Performed by: PHYSICIAN ASSISTANT

## 2021-08-25 PROCEDURE — 83615 LACTATE (LD) (LDH) ENZYME: CPT | Performed by: STUDENT IN AN ORGANIZED HEALTH CARE EDUCATION/TRAINING PROGRAM

## 2021-08-25 PROCEDURE — 84145 PROCALCITONIN (PCT): CPT | Performed by: STUDENT IN AN ORGANIZED HEALTH CARE EDUCATION/TRAINING PROGRAM

## 2021-08-25 PROCEDURE — 93005 ELECTROCARDIOGRAM TRACING: CPT | Performed by: PHYSICIAN ASSISTANT

## 2021-08-25 PROCEDURE — 93010 ELECTROCARDIOGRAM REPORT: CPT | Performed by: INTERNAL MEDICINE

## 2021-08-25 PROCEDURE — 63710000001 DEXAMETHASONE PER 0.25 MG: Performed by: STUDENT IN AN ORGANIZED HEALTH CARE EDUCATION/TRAINING PROGRAM

## 2021-08-25 RX ORDER — ASCORBIC ACID 500 MG
500 TABLET ORAL DAILY
Status: CANCELLED | OUTPATIENT
Start: 2021-08-25

## 2021-08-25 RX ORDER — SIMETHICONE 80 MG
80 TABLET,CHEWABLE ORAL EVERY 6 HOURS PRN
Status: CANCELLED | OUTPATIENT
Start: 2021-08-25

## 2021-08-25 RX ORDER — ACETAMINOPHEN 500 MG
500 TABLET ORAL EVERY 6 HOURS PRN
Status: CANCELLED | OUTPATIENT
Start: 2021-08-25

## 2021-08-25 RX ORDER — SIMETHICONE 80 MG
80 TABLET,CHEWABLE ORAL EVERY 6 HOURS PRN
Status: DISCONTINUED | OUTPATIENT
Start: 2021-08-25 | End: 2021-09-04 | Stop reason: HOSPADM

## 2021-08-25 RX ORDER — AMLODIPINE BESYLATE 10 MG/1
5 TABLET ORAL NIGHTLY
COMMUNITY

## 2021-08-25 RX ORDER — MELATONIN
1000 DAILY
Status: ON HOLD | COMMUNITY
End: 2022-11-24

## 2021-08-25 RX ORDER — LEVOTHYROXINE SODIUM 0.07 MG/1
112 TABLET ORAL
Status: DISCONTINUED | OUTPATIENT
Start: 2021-08-25 | End: 2021-09-04 | Stop reason: HOSPADM

## 2021-08-25 RX ORDER — METOPROLOL SUCCINATE 50 MG/1
50 TABLET, EXTENDED RELEASE ORAL DAILY
Status: CANCELLED | OUTPATIENT
Start: 2021-08-25

## 2021-08-25 RX ORDER — ACETAMINOPHEN 325 MG/1
650 TABLET ORAL EVERY 6 HOURS PRN
Status: DISCONTINUED | OUTPATIENT
Start: 2021-08-25 | End: 2021-09-04 | Stop reason: HOSPADM

## 2021-08-25 RX ORDER — DEXAMETHASONE 4 MG/1
6 TABLET ORAL DAILY
Status: COMPLETED | OUTPATIENT
Start: 2021-08-25 | End: 2021-09-03

## 2021-08-25 RX ORDER — ALBUTEROL SULFATE 90 UG/1
2 AEROSOL, METERED RESPIRATORY (INHALATION)
Status: DISCONTINUED | OUTPATIENT
Start: 2021-08-25 | End: 2021-08-26

## 2021-08-25 RX ORDER — SODIUM CHLORIDE 0.9 % (FLUSH) 0.9 %
10 SYRINGE (ML) INJECTION EVERY 12 HOURS SCHEDULED
Status: DISCONTINUED | OUTPATIENT
Start: 2021-08-25 | End: 2021-09-04 | Stop reason: HOSPADM

## 2021-08-25 RX ORDER — SIMETHICONE 80 MG
80 TABLET,CHEWABLE ORAL EVERY 6 HOURS PRN
Status: ON HOLD | COMMUNITY
End: 2022-11-24

## 2021-08-25 RX ORDER — PHENOL 1.4 %
600 AEROSOL, SPRAY (ML) MUCOUS MEMBRANE DAILY
Status: ON HOLD | COMMUNITY
End: 2022-11-24

## 2021-08-25 RX ORDER — POTASSIUM CHLORIDE 20 MEQ/1
40 TABLET, EXTENDED RELEASE ORAL EVERY 4 HOURS
Status: COMPLETED | OUTPATIENT
Start: 2021-08-25 | End: 2021-08-25

## 2021-08-25 RX ORDER — LEVOTHYROXINE SODIUM 0.07 MG/1
112 TABLET ORAL DAILY
Status: CANCELLED | OUTPATIENT
Start: 2021-08-25

## 2021-08-25 RX ORDER — AMLODIPINE BESYLATE 5 MG/1
5 TABLET ORAL DAILY
Status: CANCELLED | OUTPATIENT
Start: 2021-08-25

## 2021-08-25 RX ORDER — ASCORBIC ACID 500 MG
500 TABLET ORAL DAILY
Status: DISCONTINUED | OUTPATIENT
Start: 2021-08-25 | End: 2021-09-04 | Stop reason: HOSPADM

## 2021-08-25 RX ORDER — AMLODIPINE BESYLATE 5 MG/1
5 TABLET ORAL DAILY
Status: DISCONTINUED | OUTPATIENT
Start: 2021-08-25 | End: 2021-09-04 | Stop reason: HOSPADM

## 2021-08-25 RX ORDER — METOPROLOL SUCCINATE 50 MG/1
50 TABLET, EXTENDED RELEASE ORAL DAILY
Status: DISCONTINUED | OUTPATIENT
Start: 2021-08-25 | End: 2021-09-04 | Stop reason: HOSPADM

## 2021-08-25 RX ORDER — ALBUTEROL SULFATE 2.5 MG/3ML
2.5 SOLUTION RESPIRATORY (INHALATION) EVERY 4 HOURS PRN
Status: CANCELLED | OUTPATIENT
Start: 2021-08-25

## 2021-08-25 RX ORDER — ASCORBIC ACID 500 MG
500 TABLET ORAL DAILY
Status: ON HOLD | COMMUNITY
End: 2022-11-24

## 2021-08-25 RX ORDER — CALCIUM CARBONATE 500(1250)
500 TABLET ORAL DAILY
Status: CANCELLED | OUTPATIENT
Start: 2021-08-25

## 2021-08-25 RX ORDER — LOPERAMIDE HYDROCHLORIDE 2 MG/1
2 CAPSULE ORAL ONCE
Status: COMPLETED | OUTPATIENT
Start: 2021-08-25 | End: 2021-08-25

## 2021-08-25 RX ORDER — MELATONIN
1000 DAILY
Status: CANCELLED | OUTPATIENT
Start: 2021-08-25

## 2021-08-25 RX ORDER — SODIUM CHLORIDE 0.9 % (FLUSH) 0.9 %
10 SYRINGE (ML) INJECTION AS NEEDED
Status: DISCONTINUED | OUTPATIENT
Start: 2021-08-25 | End: 2021-09-04 | Stop reason: HOSPADM

## 2021-08-25 RX ORDER — POTASSIUM CHLORIDE 20 MEQ/1
40 TABLET, EXTENDED RELEASE ORAL AS NEEDED
Status: DISCONTINUED | OUTPATIENT
Start: 2021-08-25 | End: 2021-09-04 | Stop reason: HOSPADM

## 2021-08-25 RX ORDER — CALCIUM CARBONATE 500(1250)
500 TABLET ORAL DAILY
Status: DISCONTINUED | OUTPATIENT
Start: 2021-08-25 | End: 2021-09-04 | Stop reason: HOSPADM

## 2021-08-25 RX ORDER — DEXAMETHASONE SODIUM PHOSPHATE 4 MG/ML
6 INJECTION, SOLUTION INTRA-ARTICULAR; INTRALESIONAL; INTRAMUSCULAR; INTRAVENOUS; SOFT TISSUE DAILY
Status: COMPLETED | OUTPATIENT
Start: 2021-08-25 | End: 2021-09-03

## 2021-08-25 RX ORDER — BUDESONIDE AND FORMOTEROL FUMARATE DIHYDRATE 160; 4.5 UG/1; UG/1
2 AEROSOL RESPIRATORY (INHALATION)
Status: CANCELLED | OUTPATIENT
Start: 2021-08-25

## 2021-08-25 RX ORDER — OMEGA-3S/DHA/EPA/FISH OIL/D3 300MG-1000
1000 CAPSULE ORAL DAILY
Status: DISCONTINUED | OUTPATIENT
Start: 2021-08-25 | End: 2021-09-04 | Stop reason: HOSPADM

## 2021-08-25 RX ORDER — BUDESONIDE AND FORMOTEROL FUMARATE DIHYDRATE 160; 4.5 UG/1; UG/1
2 AEROSOL RESPIRATORY (INHALATION)
Status: DISCONTINUED | OUTPATIENT
Start: 2021-08-25 | End: 2021-09-04 | Stop reason: HOSPADM

## 2021-08-25 RX ORDER — ACETAMINOPHEN 500 MG
500 TABLET ORAL EVERY 6 HOURS PRN
COMMUNITY

## 2021-08-25 RX ORDER — POTASSIUM CHLORIDE 750 MG/1
40 CAPSULE, EXTENDED RELEASE ORAL AS NEEDED
Status: DISCONTINUED | OUTPATIENT
Start: 2021-08-25 | End: 2021-08-25 | Stop reason: ALTCHOICE

## 2021-08-25 RX ORDER — POTASSIUM CHLORIDE 1.5 G/1.77G
40 POWDER, FOR SOLUTION ORAL AS NEEDED
Status: DISCONTINUED | OUTPATIENT
Start: 2021-08-25 | End: 2021-09-04 | Stop reason: HOSPADM

## 2021-08-25 RX ADMIN — MAGNESIUM GLUCONATE 500 MG ORAL TABLET 400 MG: 500 TABLET ORAL at 18:22

## 2021-08-25 RX ADMIN — DEXAMETHASONE 6 MG: 4 TABLET ORAL at 18:20

## 2021-08-25 RX ADMIN — LOPERAMIDE HYDROCHLORIDE 2 MG: 2 CAPSULE ORAL at 18:21

## 2021-08-25 RX ADMIN — SODIUM CHLORIDE 1000 ML: 9 INJECTION, SOLUTION INTRAVENOUS at 05:35

## 2021-08-25 RX ADMIN — ENOXAPARIN SODIUM 40 MG: 40 INJECTION SUBCUTANEOUS at 20:06

## 2021-08-25 RX ADMIN — POTASSIUM CHLORIDE 40 MEQ: 20 TABLET, EXTENDED RELEASE ORAL at 20:06

## 2021-08-25 RX ADMIN — REMDESIVIR 200 MG: 100 INJECTION, POWDER, LYOPHILIZED, FOR SOLUTION INTRAVENOUS at 18:22

## 2021-08-25 RX ADMIN — Medication 500 MG: at 18:22

## 2021-08-25 RX ADMIN — ALBUTEROL SULFATE 2 PUFF: 90 AEROSOL, METERED RESPIRATORY (INHALATION) at 20:08

## 2021-08-25 RX ADMIN — LEVOTHYROXINE SODIUM 112 MCG: 0.07 TABLET ORAL at 18:21

## 2021-08-25 RX ADMIN — AMLODIPINE BESYLATE 5 MG: 5 TABLET ORAL at 18:21

## 2021-08-25 RX ADMIN — OXYCODONE HYDROCHLORIDE AND ACETAMINOPHEN 500 MG: 500 TABLET ORAL at 18:22

## 2021-08-25 RX ADMIN — CHOLECALCIFEROL TAB 10 MCG (400 UNIT) 1000 UNITS: 10 TAB at 18:22

## 2021-08-25 RX ADMIN — SODIUM CHLORIDE, PRESERVATIVE FREE 10 ML: 5 INJECTION INTRAVENOUS at 20:10

## 2021-08-25 RX ADMIN — METOPROLOL SUCCINATE 50 MG: 50 TABLET, EXTENDED RELEASE ORAL at 18:21

## 2021-08-25 RX ADMIN — POTASSIUM CHLORIDE 40 MEQ: 20 TABLET, EXTENDED RELEASE ORAL at 18:21

## 2021-08-26 ENCOUNTER — APPOINTMENT (OUTPATIENT)
Dept: CARDIOLOGY | Facility: HOSPITAL | Age: 81
End: 2021-08-26

## 2021-08-26 LAB
ALBUMIN SERPL-MCNC: 2.98 G/DL (ref 3.5–5.2)
ALBUMIN/GLOB SERPL: 0.9 G/DL
ALP SERPL-CCNC: 67 U/L (ref 39–117)
ALT SERPL W P-5'-P-CCNC: 26 U/L (ref 1–41)
ANION GAP SERPL CALCULATED.3IONS-SCNC: 11.3 MMOL/L (ref 5–15)
AST SERPL-CCNC: 37 U/L (ref 1–40)
B PARAPERT DNA SPEC QL NAA+PROBE: NOT DETECTED
B PERT DNA SPEC QL NAA+PROBE: NOT DETECTED
BASOPHILS # BLD AUTO: 0.01 10*3/MM3 (ref 0–0.2)
BASOPHILS NFR BLD AUTO: 0.1 % (ref 0–1.5)
BH CV ECHO MEAS - BSA(HAYCOCK): 2.2 M^2
BH CV ECHO MEAS - BSA: 2.1 M^2
BH CV ECHO MEAS - BZI_BMI: 31.9 KILOGRAMS/M^2
BH CV ECHO MEAS - BZI_METRIC_HEIGHT: 172.7 CM
BH CV ECHO MEAS - BZI_METRIC_WEIGHT: 95.3 KG
BH CV ECHO MEAS - EDV(MOD-SP4): 61.3 ML
BH CV ECHO MEAS - EF(MOD-SP4): 54.6 %
BH CV ECHO MEAS - ESV(MOD-SP4): 27.8 ML
BH CV ECHO MEAS - LV DIASTOLIC VOL/BSA (35-75): 29.4 ML/M^2
BH CV ECHO MEAS - LV SYSTOLIC VOL/BSA (12-30): 13.3 ML/M^2
BH CV ECHO MEAS - LVLD AP4: 8 CM
BH CV ECHO MEAS - LVLS AP4: 6.5 CM
BH CV ECHO MEAS - MV A MAX VEL: 101 CM/SEC
BH CV ECHO MEAS - MV E MAX VEL: 62.1 CM/SEC
BH CV ECHO MEAS - MV E/A: 0.61
BH CV ECHO MEAS - SI(MOD-SP4): 16.1 ML/M^2
BH CV ECHO MEAS - SV(MOD-SP4): 33.5 ML
BILIRUB SERPL-MCNC: 0.2 MG/DL (ref 0–1.2)
BUN SERPL-MCNC: 19 MG/DL (ref 8–23)
BUN/CREAT SERPL: 17.6 (ref 7–25)
C PNEUM DNA NPH QL NAA+NON-PROBE: NOT DETECTED
CALCIUM SPEC-SCNC: 8.4 MG/DL (ref 8.6–10.5)
CHLORIDE SERPL-SCNC: 101 MMOL/L (ref 98–107)
CO2 SERPL-SCNC: 21.7 MMOL/L (ref 22–29)
CREAT SERPL-MCNC: 1.08 MG/DL (ref 0.76–1.27)
CRP SERPL-MCNC: 4.97 MG/DL (ref 0–0.5)
D DIMER PPP FEU-MCNC: 0.73 MCGFEU/ML (ref 0–0.5)
DEPRECATED RDW RBC AUTO: 45.2 FL (ref 37–54)
EOSINOPHIL # BLD AUTO: 0 10*3/MM3 (ref 0–0.4)
EOSINOPHIL NFR BLD AUTO: 0 % (ref 0.3–6.2)
ERYTHROCYTE [DISTWIDTH] IN BLOOD BY AUTOMATED COUNT: 13.8 % (ref 12.3–15.4)
FERRITIN SERPL-MCNC: 825.7 NG/ML (ref 30–400)
FLUAV SUBTYP SPEC NAA+PROBE: NOT DETECTED
FLUBV RNA ISLT QL NAA+PROBE: NOT DETECTED
GFR SERPL CREATININE-BSD FRML MDRD: 65 ML/MIN/1.73
GLOBULIN UR ELPH-MCNC: 3.3 GM/DL
GLUCOSE BLDC GLUCOMTR-MCNC: 141 MG/DL (ref 70–130)
GLUCOSE SERPL-MCNC: 189 MG/DL (ref 65–99)
HADV DNA SPEC NAA+PROBE: NOT DETECTED
HCOV 229E RNA SPEC QL NAA+PROBE: NOT DETECTED
HCOV HKU1 RNA SPEC QL NAA+PROBE: NOT DETECTED
HCOV NL63 RNA SPEC QL NAA+PROBE: NOT DETECTED
HCOV OC43 RNA SPEC QL NAA+PROBE: NOT DETECTED
HCT VFR BLD AUTO: 45.4 % (ref 37.5–51)
HGB BLD-MCNC: 15.6 G/DL (ref 13–17.7)
HMPV RNA NPH QL NAA+NON-PROBE: NOT DETECTED
HPIV1 RNA SPEC QL NAA+PROBE: NOT DETECTED
HPIV2 RNA SPEC QL NAA+PROBE: NOT DETECTED
HPIV3 RNA NPH QL NAA+PROBE: NOT DETECTED
HPIV4 P GENE NPH QL NAA+PROBE: NOT DETECTED
IMM GRANULOCYTES # BLD AUTO: 0.05 10*3/MM3 (ref 0–0.05)
IMM GRANULOCYTES NFR BLD AUTO: 0.4 % (ref 0–0.5)
LDH SERPL-CCNC: 347 U/L (ref 135–225)
LYMPHOCYTES # BLD AUTO: 5.58 10*3/MM3 (ref 0.7–3.1)
LYMPHOCYTES NFR BLD AUTO: 41.2 % (ref 19.6–45.3)
M PNEUMO IGG SER IA-ACNC: NOT DETECTED
MAXIMAL PREDICTED HEART RATE: 137 BPM
MCH RBC QN AUTO: 30.5 PG (ref 26.6–33)
MCHC RBC AUTO-ENTMCNC: 34.4 G/DL (ref 31.5–35.7)
MCV RBC AUTO: 88.7 FL (ref 79–97)
MONOCYTES # BLD AUTO: 0.59 10*3/MM3 (ref 0.1–0.9)
MONOCYTES NFR BLD AUTO: 4.4 % (ref 5–12)
NEUTROPHILS NFR BLD AUTO: 53.9 % (ref 42.7–76)
NEUTROPHILS NFR BLD AUTO: 7.33 10*3/MM3 (ref 1.7–7)
NRBC BLD AUTO-RTO: 0 /100 WBC (ref 0–0.2)
PLATELET # BLD AUTO: 187 10*3/MM3 (ref 140–450)
PMV BLD AUTO: 9.8 FL (ref 6–12)
POTASSIUM SERPL-SCNC: 4.1 MMOL/L (ref 3.5–5.2)
PROCALCITONIN SERPL-MCNC: 0.14 NG/ML (ref 0–0.25)
PROT SERPL-MCNC: 6.3 G/DL (ref 6–8.5)
RBC # BLD AUTO: 5.12 10*6/MM3 (ref 4.14–5.8)
RHINOVIRUS RNA SPEC NAA+PROBE: NOT DETECTED
RSV RNA NPH QL NAA+NON-PROBE: NOT DETECTED
S PNEUM AG SPEC QL LA: NEGATIVE
SODIUM SERPL-SCNC: 134 MMOL/L (ref 136–145)
STRESS TARGET HR: 116 BPM
WBC # BLD AUTO: 13.56 10*3/MM3 (ref 3.4–10.8)

## 2021-08-26 PROCEDURE — 85379 FIBRIN DEGRADATION QUANT: CPT | Performed by: STUDENT IN AN ORGANIZED HEALTH CARE EDUCATION/TRAINING PROGRAM

## 2021-08-26 PROCEDURE — 82728 ASSAY OF FERRITIN: CPT | Performed by: STUDENT IN AN ORGANIZED HEALTH CARE EDUCATION/TRAINING PROGRAM

## 2021-08-26 PROCEDURE — 86140 C-REACTIVE PROTEIN: CPT | Performed by: STUDENT IN AN ORGANIZED HEALTH CARE EDUCATION/TRAINING PROGRAM

## 2021-08-26 PROCEDURE — 94799 UNLISTED PULMONARY SVC/PX: CPT

## 2021-08-26 PROCEDURE — 83615 LACTATE (LD) (LDH) ENZYME: CPT | Performed by: STUDENT IN AN ORGANIZED HEALTH CARE EDUCATION/TRAINING PROGRAM

## 2021-08-26 PROCEDURE — 93306 TTE W/DOPPLER COMPLETE: CPT

## 2021-08-26 PROCEDURE — 87633 RESP VIRUS 12-25 TARGETS: CPT | Performed by: STUDENT IN AN ORGANIZED HEALTH CARE EDUCATION/TRAINING PROGRAM

## 2021-08-26 PROCEDURE — 82962 GLUCOSE BLOOD TEST: CPT

## 2021-08-26 PROCEDURE — 85025 COMPLETE CBC W/AUTO DIFF WBC: CPT | Performed by: STUDENT IN AN ORGANIZED HEALTH CARE EDUCATION/TRAINING PROGRAM

## 2021-08-26 PROCEDURE — 93306 TTE W/DOPPLER COMPLETE: CPT | Performed by: SPECIALIST

## 2021-08-26 PROCEDURE — 80053 COMPREHEN METABOLIC PANEL: CPT | Performed by: STUDENT IN AN ORGANIZED HEALTH CARE EDUCATION/TRAINING PROGRAM

## 2021-08-26 PROCEDURE — 25010000002 ENOXAPARIN PER 10 MG: Performed by: STUDENT IN AN ORGANIZED HEALTH CARE EDUCATION/TRAINING PROGRAM

## 2021-08-26 PROCEDURE — 63710000001 DEXAMETHASONE PER 0.25 MG: Performed by: STUDENT IN AN ORGANIZED HEALTH CARE EDUCATION/TRAINING PROGRAM

## 2021-08-26 PROCEDURE — 99233 SBSQ HOSP IP/OBS HIGH 50: CPT | Performed by: STUDENT IN AN ORGANIZED HEALTH CARE EDUCATION/TRAINING PROGRAM

## 2021-08-26 RX ORDER — CHOLECALCIFEROL (VITAMIN D3) 125 MCG
5 CAPSULE ORAL NIGHTLY
Status: DISCONTINUED | OUTPATIENT
Start: 2021-08-26 | End: 2021-08-28

## 2021-08-26 RX ORDER — ALBUTEROL SULFATE 90 UG/1
2 AEROSOL, METERED RESPIRATORY (INHALATION) EVERY 4 HOURS PRN
Status: DISCONTINUED | OUTPATIENT
Start: 2021-08-26 | End: 2021-09-04 | Stop reason: HOSPADM

## 2021-08-26 RX ADMIN — LEVOTHYROXINE SODIUM 112 MCG: 0.07 TABLET ORAL at 05:41

## 2021-08-26 RX ADMIN — SODIUM CHLORIDE, PRESERVATIVE FREE 10 ML: 5 INJECTION INTRAVENOUS at 20:07

## 2021-08-26 RX ADMIN — ALBUTEROL SULFATE 2 PUFF: 90 AEROSOL, METERED RESPIRATORY (INHALATION) at 09:10

## 2021-08-26 RX ADMIN — DEXAMETHASONE 6 MG: 4 TABLET ORAL at 09:04

## 2021-08-26 RX ADMIN — ALBUTEROL SULFATE 2 PUFF: 90 AEROSOL, METERED RESPIRATORY (INHALATION) at 03:35

## 2021-08-26 RX ADMIN — BUDESONIDE AND FORMOTEROL FUMARATE DIHYDRATE 2 PUFF: 160; 4.5 AEROSOL RESPIRATORY (INHALATION) at 00:20

## 2021-08-26 RX ADMIN — BUDESONIDE AND FORMOTEROL FUMARATE DIHYDRATE 2 PUFF: 160; 4.5 AEROSOL RESPIRATORY (INHALATION) at 09:10

## 2021-08-26 RX ADMIN — ENOXAPARIN SODIUM 40 MG: 40 INJECTION SUBCUTANEOUS at 18:26

## 2021-08-26 RX ADMIN — AMLODIPINE BESYLATE 5 MG: 5 TABLET ORAL at 09:06

## 2021-08-26 RX ADMIN — METOPROLOL SUCCINATE 50 MG: 50 TABLET, EXTENDED RELEASE ORAL at 09:07

## 2021-08-26 RX ADMIN — BUDESONIDE AND FORMOTEROL FUMARATE DIHYDRATE 2 PUFF: 160; 4.5 AEROSOL RESPIRATORY (INHALATION) at 20:08

## 2021-08-26 RX ADMIN — ALBUTEROL SULFATE 2 PUFF: 90 AEROSOL, METERED RESPIRATORY (INHALATION) at 16:53

## 2021-08-26 RX ADMIN — REMDESIVIR 100 MG: 100 INJECTION, POWDER, LYOPHILIZED, FOR SOLUTION INTRAVENOUS at 17:02

## 2021-08-26 RX ADMIN — SODIUM CHLORIDE, PRESERVATIVE FREE 10 ML: 5 INJECTION INTRAVENOUS at 09:07

## 2021-08-26 RX ADMIN — Medication 5 MG: at 20:07

## 2021-08-27 LAB
ALBUMIN SERPL-MCNC: 2.72 G/DL (ref 3.5–5.2)
ALBUMIN/GLOB SERPL: 0.9 G/DL
ALP SERPL-CCNC: 59 U/L (ref 39–117)
ALT SERPL W P-5'-P-CCNC: 24 U/L (ref 1–41)
ANION GAP SERPL CALCULATED.3IONS-SCNC: 7.6 MMOL/L (ref 5–15)
AST SERPL-CCNC: 34 U/L (ref 1–40)
BILIRUB SERPL-MCNC: 0.2 MG/DL (ref 0–1.2)
BUN SERPL-MCNC: 19 MG/DL (ref 8–23)
BUN/CREAT SERPL: 20.4 (ref 7–25)
CALCIUM SPEC-SCNC: 8.1 MG/DL (ref 8.6–10.5)
CHLORIDE SERPL-SCNC: 104 MMOL/L (ref 98–107)
CO2 SERPL-SCNC: 23.4 MMOL/L (ref 22–29)
CREAT SERPL-MCNC: 0.93 MG/DL (ref 0.76–1.27)
CRP SERPL-MCNC: 3.09 MG/DL (ref 0–0.5)
DEPRECATED RDW RBC AUTO: 47 FL (ref 37–54)
EOSINOPHIL # BLD MANUAL: 0.3 10*3/MM3 (ref 0–0.4)
EOSINOPHIL NFR BLD MANUAL: 2 % (ref 0.3–6.2)
ERYTHROCYTE [DISTWIDTH] IN BLOOD BY AUTOMATED COUNT: 14.2 % (ref 12.3–15.4)
FERRITIN SERPL-MCNC: 804.7 NG/ML (ref 30–400)
GFR SERPL CREATININE-BSD FRML MDRD: 78 ML/MIN/1.73
GLOBULIN UR ELPH-MCNC: 3 GM/DL
GLUCOSE BLDC GLUCOMTR-MCNC: 100 MG/DL (ref 70–130)
GLUCOSE SERPL-MCNC: 127 MG/DL (ref 65–99)
HCT VFR BLD AUTO: 44.7 % (ref 37.5–51)
HGB BLD-MCNC: 15.1 G/DL (ref 13–17.7)
LYMPHOCYTES # BLD MANUAL: 3.5 10*3/MM3 (ref 0.7–3.1)
LYMPHOCYTES NFR BLD MANUAL: 23 % (ref 19.6–45.3)
LYMPHOCYTES NFR BLD MANUAL: 7 % (ref 5–12)
MCH RBC QN AUTO: 30.5 PG (ref 26.6–33)
MCHC RBC AUTO-ENTMCNC: 33.8 G/DL (ref 31.5–35.7)
MCV RBC AUTO: 90.3 FL (ref 79–97)
MONOCYTES # BLD AUTO: 1.07 10*3/MM3 (ref 0.1–0.9)
NEUTROPHILS # BLD AUTO: 10.36 10*3/MM3 (ref 1.7–7)
NEUTROPHILS NFR BLD MANUAL: 61 % (ref 42.7–76)
NEUTS BAND NFR BLD MANUAL: 7 % (ref 0–5)
PLAT MORPH BLD: NORMAL
PLATELET # BLD AUTO: 207 10*3/MM3 (ref 140–450)
PMV BLD AUTO: 10 FL (ref 6–12)
POTASSIUM SERPL-SCNC: 4.7 MMOL/L (ref 3.5–5.2)
PROT SERPL-MCNC: 5.7 G/DL (ref 6–8.5)
RBC # BLD AUTO: 4.95 10*6/MM3 (ref 4.14–5.8)
RBC MORPH BLD: NORMAL
SODIUM SERPL-SCNC: 135 MMOL/L (ref 136–145)
WBC # BLD AUTO: 15.23 10*3/MM3 (ref 3.4–10.8)

## 2021-08-27 PROCEDURE — 80053 COMPREHEN METABOLIC PANEL: CPT | Performed by: STUDENT IN AN ORGANIZED HEALTH CARE EDUCATION/TRAINING PROGRAM

## 2021-08-27 PROCEDURE — 63710000001 DEXAMETHASONE PER 0.25 MG: Performed by: STUDENT IN AN ORGANIZED HEALTH CARE EDUCATION/TRAINING PROGRAM

## 2021-08-27 PROCEDURE — 86140 C-REACTIVE PROTEIN: CPT | Performed by: STUDENT IN AN ORGANIZED HEALTH CARE EDUCATION/TRAINING PROGRAM

## 2021-08-27 PROCEDURE — 85025 COMPLETE CBC W/AUTO DIFF WBC: CPT | Performed by: STUDENT IN AN ORGANIZED HEALTH CARE EDUCATION/TRAINING PROGRAM

## 2021-08-27 PROCEDURE — 99233 SBSQ HOSP IP/OBS HIGH 50: CPT | Performed by: STUDENT IN AN ORGANIZED HEALTH CARE EDUCATION/TRAINING PROGRAM

## 2021-08-27 PROCEDURE — 85007 BL SMEAR W/DIFF WBC COUNT: CPT | Performed by: STUDENT IN AN ORGANIZED HEALTH CARE EDUCATION/TRAINING PROGRAM

## 2021-08-27 PROCEDURE — 82728 ASSAY OF FERRITIN: CPT | Performed by: STUDENT IN AN ORGANIZED HEALTH CARE EDUCATION/TRAINING PROGRAM

## 2021-08-27 PROCEDURE — 25010000002 ENOXAPARIN PER 10 MG: Performed by: STUDENT IN AN ORGANIZED HEALTH CARE EDUCATION/TRAINING PROGRAM

## 2021-08-27 PROCEDURE — 82962 GLUCOSE BLOOD TEST: CPT

## 2021-08-27 RX ADMIN — OXYCODONE HYDROCHLORIDE AND ACETAMINOPHEN 500 MG: 500 TABLET ORAL at 08:40

## 2021-08-27 RX ADMIN — Medication 5 MG: at 20:19

## 2021-08-27 RX ADMIN — BUDESONIDE AND FORMOTEROL FUMARATE DIHYDRATE 2 PUFF: 160; 4.5 AEROSOL RESPIRATORY (INHALATION) at 20:20

## 2021-08-27 RX ADMIN — METOPROLOL SUCCINATE 50 MG: 50 TABLET, EXTENDED RELEASE ORAL at 08:39

## 2021-08-27 RX ADMIN — LEVOTHYROXINE SODIUM 112 MCG: 0.07 TABLET ORAL at 06:17

## 2021-08-27 RX ADMIN — SODIUM CHLORIDE, PRESERVATIVE FREE 10 ML: 5 INJECTION INTRAVENOUS at 20:20

## 2021-08-27 RX ADMIN — CHOLECALCIFEROL TAB 10 MCG (400 UNIT) 1000 UNITS: 10 TAB at 08:39

## 2021-08-27 RX ADMIN — BUDESONIDE AND FORMOTEROL FUMARATE DIHYDRATE 2 PUFF: 160; 4.5 AEROSOL RESPIRATORY (INHALATION) at 08:41

## 2021-08-27 RX ADMIN — NYSTATIN 500000 UNITS: 100000 SUSPENSION ORAL at 21:11

## 2021-08-27 RX ADMIN — DEXAMETHASONE 6 MG: 4 TABLET ORAL at 08:39

## 2021-08-27 RX ADMIN — Medication 500 MG: at 08:40

## 2021-08-27 RX ADMIN — SODIUM CHLORIDE, PRESERVATIVE FREE 10 ML: 5 INJECTION INTRAVENOUS at 08:40

## 2021-08-27 RX ADMIN — ALBUTEROL SULFATE 2 PUFF: 90 AEROSOL, METERED RESPIRATORY (INHALATION) at 06:20

## 2021-08-27 RX ADMIN — ALBUTEROL SULFATE 2 PUFF: 90 AEROSOL, METERED RESPIRATORY (INHALATION) at 12:02

## 2021-08-27 RX ADMIN — AMLODIPINE BESYLATE 5 MG: 5 TABLET ORAL at 08:39

## 2021-08-27 RX ADMIN — ENOXAPARIN SODIUM 40 MG: 40 INJECTION SUBCUTANEOUS at 18:12

## 2021-08-27 RX ADMIN — REMDESIVIR 100 MG: 100 INJECTION, POWDER, LYOPHILIZED, FOR SOLUTION INTRAVENOUS at 16:57

## 2021-08-27 RX ADMIN — MAGNESIUM GLUCONATE 500 MG ORAL TABLET 400 MG: 500 TABLET ORAL at 08:39

## 2021-08-28 LAB
ALBUMIN SERPL-MCNC: 2.81 G/DL (ref 3.5–5.2)
ALBUMIN/GLOB SERPL: 1 G/DL
ALP SERPL-CCNC: 57 U/L (ref 39–117)
ALT SERPL W P-5'-P-CCNC: 32 U/L (ref 1–41)
ANION GAP SERPL CALCULATED.3IONS-SCNC: 7.4 MMOL/L (ref 5–15)
AST SERPL-CCNC: 35 U/L (ref 1–40)
BASOPHILS # BLD AUTO: 0.01 10*3/MM3 (ref 0–0.2)
BASOPHILS NFR BLD AUTO: 0.1 % (ref 0–1.5)
BILIRUB SERPL-MCNC: 0.3 MG/DL (ref 0–1.2)
BUN SERPL-MCNC: 20 MG/DL (ref 8–23)
BUN/CREAT SERPL: 22 (ref 7–25)
CALCIUM SPEC-SCNC: 8 MG/DL (ref 8.6–10.5)
CHLORIDE SERPL-SCNC: 103 MMOL/L (ref 98–107)
CO2 SERPL-SCNC: 22.6 MMOL/L (ref 22–29)
CREAT SERPL-MCNC: 0.91 MG/DL (ref 0.76–1.27)
CRP SERPL-MCNC: 1.67 MG/DL (ref 0–0.5)
D DIMER PPP FEU-MCNC: 0.61 MCGFEU/ML (ref 0–0.5)
DEPRECATED RDW RBC AUTO: 45.1 FL (ref 37–54)
EOSINOPHIL # BLD AUTO: 0 10*3/MM3 (ref 0–0.4)
EOSINOPHIL NFR BLD AUTO: 0 % (ref 0.3–6.2)
ERYTHROCYTE [DISTWIDTH] IN BLOOD BY AUTOMATED COUNT: 13.8 % (ref 12.3–15.4)
FERRITIN SERPL-MCNC: 725.2 NG/ML (ref 30–400)
GFR SERPL CREATININE-BSD FRML MDRD: 80 ML/MIN/1.73
GLOBULIN UR ELPH-MCNC: 2.8 GM/DL
GLUCOSE SERPL-MCNC: 127 MG/DL (ref 65–99)
HCT VFR BLD AUTO: 43.2 % (ref 37.5–51)
HGB BLD-MCNC: 14.6 G/DL (ref 13–17.7)
IMM GRANULOCYTES # BLD AUTO: 0.08 10*3/MM3 (ref 0–0.05)
IMM GRANULOCYTES NFR BLD AUTO: 0.5 % (ref 0–0.5)
LDH SERPL-CCNC: 359 U/L (ref 135–225)
LYMPHOCYTES # BLD AUTO: 5.33 10*3/MM3 (ref 0.7–3.1)
LYMPHOCYTES NFR BLD AUTO: 33.5 % (ref 19.6–45.3)
MCH RBC QN AUTO: 30.1 PG (ref 26.6–33)
MCHC RBC AUTO-ENTMCNC: 33.8 G/DL (ref 31.5–35.7)
MCV RBC AUTO: 89.1 FL (ref 79–97)
MONOCYTES # BLD AUTO: 0.93 10*3/MM3 (ref 0.1–0.9)
MONOCYTES NFR BLD AUTO: 5.8 % (ref 5–12)
NEUTROPHILS NFR BLD AUTO: 60.1 % (ref 42.7–76)
NEUTROPHILS NFR BLD AUTO: 9.58 10*3/MM3 (ref 1.7–7)
NRBC BLD AUTO-RTO: 0 /100 WBC (ref 0–0.2)
PLATELET # BLD AUTO: 210 10*3/MM3 (ref 140–450)
PMV BLD AUTO: 9.7 FL (ref 6–12)
POTASSIUM SERPL-SCNC: 4 MMOL/L (ref 3.5–5.2)
PROT SERPL-MCNC: 5.6 G/DL (ref 6–8.5)
RBC # BLD AUTO: 4.85 10*6/MM3 (ref 4.14–5.8)
SODIUM SERPL-SCNC: 133 MMOL/L (ref 136–145)
WBC # BLD AUTO: 15.93 10*3/MM3 (ref 3.4–10.8)

## 2021-08-28 PROCEDURE — 25010000002 ENOXAPARIN PER 10 MG: Performed by: STUDENT IN AN ORGANIZED HEALTH CARE EDUCATION/TRAINING PROGRAM

## 2021-08-28 PROCEDURE — 94799 UNLISTED PULMONARY SVC/PX: CPT

## 2021-08-28 PROCEDURE — 80053 COMPREHEN METABOLIC PANEL: CPT | Performed by: STUDENT IN AN ORGANIZED HEALTH CARE EDUCATION/TRAINING PROGRAM

## 2021-08-28 PROCEDURE — 63710000001 DEXAMETHASONE PER 0.25 MG: Performed by: STUDENT IN AN ORGANIZED HEALTH CARE EDUCATION/TRAINING PROGRAM

## 2021-08-28 PROCEDURE — 82728 ASSAY OF FERRITIN: CPT | Performed by: STUDENT IN AN ORGANIZED HEALTH CARE EDUCATION/TRAINING PROGRAM

## 2021-08-28 PROCEDURE — 85025 COMPLETE CBC W/AUTO DIFF WBC: CPT | Performed by: STUDENT IN AN ORGANIZED HEALTH CARE EDUCATION/TRAINING PROGRAM

## 2021-08-28 PROCEDURE — 85379 FIBRIN DEGRADATION QUANT: CPT | Performed by: STUDENT IN AN ORGANIZED HEALTH CARE EDUCATION/TRAINING PROGRAM

## 2021-08-28 PROCEDURE — 86140 C-REACTIVE PROTEIN: CPT | Performed by: STUDENT IN AN ORGANIZED HEALTH CARE EDUCATION/TRAINING PROGRAM

## 2021-08-28 PROCEDURE — 99233 SBSQ HOSP IP/OBS HIGH 50: CPT | Performed by: STUDENT IN AN ORGANIZED HEALTH CARE EDUCATION/TRAINING PROGRAM

## 2021-08-28 PROCEDURE — 83615 LACTATE (LD) (LDH) ENZYME: CPT | Performed by: STUDENT IN AN ORGANIZED HEALTH CARE EDUCATION/TRAINING PROGRAM

## 2021-08-28 RX ORDER — CHOLECALCIFEROL (VITAMIN D3) 125 MCG
10 CAPSULE ORAL NIGHTLY
Status: DISCONTINUED | OUTPATIENT
Start: 2021-08-28 | End: 2021-09-04 | Stop reason: HOSPADM

## 2021-08-28 RX ADMIN — DEXAMETHASONE 6 MG: 4 TABLET ORAL at 08:11

## 2021-08-28 RX ADMIN — NYSTATIN 500000 UNITS: 100000 SUSPENSION ORAL at 12:28

## 2021-08-28 RX ADMIN — BUDESONIDE AND FORMOTEROL FUMARATE DIHYDRATE 2 PUFF: 160; 4.5 AEROSOL RESPIRATORY (INHALATION) at 10:26

## 2021-08-28 RX ADMIN — AMLODIPINE BESYLATE 5 MG: 5 TABLET ORAL at 08:11

## 2021-08-28 RX ADMIN — REMDESIVIR 100 MG: 100 INJECTION, POWDER, LYOPHILIZED, FOR SOLUTION INTRAVENOUS at 17:46

## 2021-08-28 RX ADMIN — NYSTATIN 500000 UNITS: 100000 SUSPENSION ORAL at 19:38

## 2021-08-28 RX ADMIN — NYSTATIN 500000 UNITS: 100000 SUSPENSION ORAL at 08:11

## 2021-08-28 RX ADMIN — ALBUTEROL SULFATE 2 PUFF: 90 AEROSOL, METERED RESPIRATORY (INHALATION) at 12:30

## 2021-08-28 RX ADMIN — SODIUM CHLORIDE, PRESERVATIVE FREE 10 ML: 5 INJECTION INTRAVENOUS at 08:11

## 2021-08-28 RX ADMIN — CHOLECALCIFEROL TAB 10 MCG (400 UNIT) 1000 UNITS: 10 TAB at 08:11

## 2021-08-28 RX ADMIN — HYDROCHLOROTHIAZIDE: 12.5 CAPSULE, GELATIN COATED ORAL at 21:08

## 2021-08-28 RX ADMIN — MAGNESIUM GLUCONATE 500 MG ORAL TABLET 400 MG: 500 TABLET ORAL at 08:11

## 2021-08-28 RX ADMIN — OXYCODONE HYDROCHLORIDE AND ACETAMINOPHEN 500 MG: 500 TABLET ORAL at 08:11

## 2021-08-28 RX ADMIN — Medication 10 MG: at 21:08

## 2021-08-28 RX ADMIN — NYSTATIN 500000 UNITS: 100000 SUSPENSION ORAL at 17:47

## 2021-08-28 RX ADMIN — ACETAMINOPHEN 650 MG: 325 TABLET ORAL at 21:08

## 2021-08-28 RX ADMIN — BUDESONIDE AND FORMOTEROL FUMARATE DIHYDRATE 2 PUFF: 160; 4.5 AEROSOL RESPIRATORY (INHALATION) at 19:07

## 2021-08-28 RX ADMIN — LEVOTHYROXINE SODIUM 112 MCG: 0.07 TABLET ORAL at 05:52

## 2021-08-28 RX ADMIN — METOPROLOL SUCCINATE 50 MG: 50 TABLET, EXTENDED RELEASE ORAL at 08:11

## 2021-08-28 RX ADMIN — Medication 500 MG: at 08:11

## 2021-08-28 RX ADMIN — ENOXAPARIN SODIUM 40 MG: 40 INJECTION SUBCUTANEOUS at 17:47

## 2021-08-29 ENCOUNTER — APPOINTMENT (OUTPATIENT)
Dept: GENERAL RADIOLOGY | Facility: HOSPITAL | Age: 81
End: 2021-08-29

## 2021-08-29 LAB
ALBUMIN SERPL-MCNC: 2.97 G/DL (ref 3.5–5.2)
ALBUMIN/GLOB SERPL: 0.9 G/DL
ALP SERPL-CCNC: 67 U/L (ref 39–117)
ALT SERPL W P-5'-P-CCNC: 68 U/L (ref 1–41)
ANION GAP SERPL CALCULATED.3IONS-SCNC: 10.2 MMOL/L (ref 5–15)
AST SERPL-CCNC: 45 U/L (ref 1–40)
BASOPHILS # BLD AUTO: 0.03 10*3/MM3 (ref 0–0.2)
BASOPHILS NFR BLD AUTO: 0.1 % (ref 0–1.5)
BILIRUB SERPL-MCNC: 0.5 MG/DL (ref 0–1.2)
BUN SERPL-MCNC: 18 MG/DL (ref 8–23)
BUN/CREAT SERPL: 20 (ref 7–25)
CALCIUM SPEC-SCNC: 8.2 MG/DL (ref 8.6–10.5)
CHLORIDE SERPL-SCNC: 102 MMOL/L (ref 98–107)
CO2 SERPL-SCNC: 22.8 MMOL/L (ref 22–29)
CREAT SERPL-MCNC: 0.9 MG/DL (ref 0.76–1.27)
CRP SERPL-MCNC: 1.02 MG/DL (ref 0–0.5)
DEPRECATED RDW RBC AUTO: 47.3 FL (ref 37–54)
EOSINOPHIL # BLD AUTO: 0 10*3/MM3 (ref 0–0.4)
EOSINOPHIL NFR BLD AUTO: 0 % (ref 0.3–6.2)
ERYTHROCYTE [DISTWIDTH] IN BLOOD BY AUTOMATED COUNT: 13.9 % (ref 12.3–15.4)
FERRITIN SERPL-MCNC: 844.1 NG/ML (ref 30–400)
GFR SERPL CREATININE-BSD FRML MDRD: 81 ML/MIN/1.73
GLOBULIN UR ELPH-MCNC: 3.2 GM/DL
GLUCOSE SERPL-MCNC: 134 MG/DL (ref 65–99)
HCT VFR BLD AUTO: 48.8 % (ref 37.5–51)
HGB BLD-MCNC: 15.7 G/DL (ref 13–17.7)
IMM GRANULOCYTES # BLD AUTO: 0.23 10*3/MM3 (ref 0–0.05)
IMM GRANULOCYTES NFR BLD AUTO: 1.1 % (ref 0–0.5)
LYMPHOCYTES # BLD AUTO: 7.85 10*3/MM3 (ref 0.7–3.1)
LYMPHOCYTES NFR BLD AUTO: 37.2 % (ref 19.6–45.3)
MCH RBC QN AUTO: 29.5 PG (ref 26.6–33)
MCHC RBC AUTO-ENTMCNC: 32.2 G/DL (ref 31.5–35.7)
MCV RBC AUTO: 91.7 FL (ref 79–97)
MONOCYTES # BLD AUTO: 0.75 10*3/MM3 (ref 0.1–0.9)
MONOCYTES NFR BLD AUTO: 3.6 % (ref 5–12)
NEUTROPHILS NFR BLD AUTO: 12.23 10*3/MM3 (ref 1.7–7)
NEUTROPHILS NFR BLD AUTO: 58 % (ref 42.7–76)
NRBC BLD AUTO-RTO: 0 /100 WBC (ref 0–0.2)
PLAT MORPH BLD: NORMAL
PLATELET # BLD AUTO: 284 10*3/MM3 (ref 140–450)
PMV BLD AUTO: 9.7 FL (ref 6–12)
POTASSIUM SERPL-SCNC: 4 MMOL/L (ref 3.5–5.2)
PROT SERPL-MCNC: 6.2 G/DL (ref 6–8.5)
RBC # BLD AUTO: 5.32 10*6/MM3 (ref 4.14–5.8)
RBC MORPH BLD: NORMAL
SMUDGE CELLS BLD QL SMEAR: NORMAL
SODIUM SERPL-SCNC: 135 MMOL/L (ref 136–145)
WBC # BLD AUTO: 21.09 10*3/MM3 (ref 3.4–10.8)

## 2021-08-29 PROCEDURE — 80053 COMPREHEN METABOLIC PANEL: CPT | Performed by: STUDENT IN AN ORGANIZED HEALTH CARE EDUCATION/TRAINING PROGRAM

## 2021-08-29 PROCEDURE — 63710000001 DEXAMETHASONE PER 0.25 MG: Performed by: STUDENT IN AN ORGANIZED HEALTH CARE EDUCATION/TRAINING PROGRAM

## 2021-08-29 PROCEDURE — 85060 BLOOD SMEAR INTERPRETATION: CPT | Performed by: STUDENT IN AN ORGANIZED HEALTH CARE EDUCATION/TRAINING PROGRAM

## 2021-08-29 PROCEDURE — 71045 X-RAY EXAM CHEST 1 VIEW: CPT | Performed by: RADIOLOGY

## 2021-08-29 PROCEDURE — 85025 COMPLETE CBC W/AUTO DIFF WBC: CPT | Performed by: STUDENT IN AN ORGANIZED HEALTH CARE EDUCATION/TRAINING PROGRAM

## 2021-08-29 PROCEDURE — 71045 X-RAY EXAM CHEST 1 VIEW: CPT

## 2021-08-29 PROCEDURE — 94799 UNLISTED PULMONARY SVC/PX: CPT

## 2021-08-29 PROCEDURE — 86140 C-REACTIVE PROTEIN: CPT | Performed by: STUDENT IN AN ORGANIZED HEALTH CARE EDUCATION/TRAINING PROGRAM

## 2021-08-29 PROCEDURE — 82728 ASSAY OF FERRITIN: CPT | Performed by: STUDENT IN AN ORGANIZED HEALTH CARE EDUCATION/TRAINING PROGRAM

## 2021-08-29 PROCEDURE — 25010000002 ENOXAPARIN PER 10 MG: Performed by: STUDENT IN AN ORGANIZED HEALTH CARE EDUCATION/TRAINING PROGRAM

## 2021-08-29 PROCEDURE — 85007 BL SMEAR W/DIFF WBC COUNT: CPT | Performed by: STUDENT IN AN ORGANIZED HEALTH CARE EDUCATION/TRAINING PROGRAM

## 2021-08-29 PROCEDURE — 99232 SBSQ HOSP IP/OBS MODERATE 35: CPT | Performed by: STUDENT IN AN ORGANIZED HEALTH CARE EDUCATION/TRAINING PROGRAM

## 2021-08-29 PROCEDURE — 94640 AIRWAY INHALATION TREATMENT: CPT

## 2021-08-29 RX ORDER — HYDROXYZINE HYDROCHLORIDE 25 MG/1
25 TABLET, FILM COATED ORAL 3 TIMES DAILY PRN
Status: DISCONTINUED | OUTPATIENT
Start: 2021-08-29 | End: 2021-09-04 | Stop reason: HOSPADM

## 2021-08-29 RX ORDER — PANTOPRAZOLE SODIUM 40 MG/1
40 TABLET, DELAYED RELEASE ORAL
Status: DISCONTINUED | OUTPATIENT
Start: 2021-08-29 | End: 2021-09-04 | Stop reason: HOSPADM

## 2021-08-29 RX ADMIN — CHOLECALCIFEROL TAB 10 MCG (400 UNIT) 1000 UNITS: 10 TAB at 08:39

## 2021-08-29 RX ADMIN — PANTOPRAZOLE SODIUM 40 MG: 40 TABLET, DELAYED RELEASE ORAL at 10:28

## 2021-08-29 RX ADMIN — DEXAMETHASONE 6 MG: 4 TABLET ORAL at 08:39

## 2021-08-29 RX ADMIN — OXYCODONE HYDROCHLORIDE AND ACETAMINOPHEN 500 MG: 500 TABLET ORAL at 08:39

## 2021-08-29 RX ADMIN — SODIUM CHLORIDE, PRESERVATIVE FREE 10 ML: 5 INJECTION INTRAVENOUS at 20:09

## 2021-08-29 RX ADMIN — BUDESONIDE AND FORMOTEROL FUMARATE DIHYDRATE 2 PUFF: 160; 4.5 AEROSOL RESPIRATORY (INHALATION) at 19:16

## 2021-08-29 RX ADMIN — HYDROCHLOROTHIAZIDE: 12.5 CAPSULE, GELATIN COATED ORAL at 08:38

## 2021-08-29 RX ADMIN — LEVOTHYROXINE SODIUM 112 MCG: 0.07 TABLET ORAL at 05:16

## 2021-08-29 RX ADMIN — MAGNESIUM GLUCONATE 500 MG ORAL TABLET 400 MG: 500 TABLET ORAL at 08:38

## 2021-08-29 RX ADMIN — NYSTATIN 500000 UNITS: 100000 SUSPENSION ORAL at 10:28

## 2021-08-29 RX ADMIN — NYSTATIN 500000 UNITS: 100000 SUSPENSION ORAL at 08:39

## 2021-08-29 RX ADMIN — BUDESONIDE AND FORMOTEROL FUMARATE DIHYDRATE 2 PUFF: 160; 4.5 AEROSOL RESPIRATORY (INHALATION) at 08:39

## 2021-08-29 RX ADMIN — METOPROLOL SUCCINATE 50 MG: 50 TABLET, EXTENDED RELEASE ORAL at 08:39

## 2021-08-29 RX ADMIN — Medication 500 MG: at 08:38

## 2021-08-29 RX ADMIN — ENOXAPARIN SODIUM 40 MG: 40 INJECTION SUBCUTANEOUS at 17:02

## 2021-08-29 RX ADMIN — REMDESIVIR 100 MG: 100 INJECTION, POWDER, LYOPHILIZED, FOR SOLUTION INTRAVENOUS at 17:02

## 2021-08-29 RX ADMIN — SODIUM CHLORIDE, PRESERVATIVE FREE 10 ML: 5 INJECTION INTRAVENOUS at 08:39

## 2021-08-29 RX ADMIN — HYDROXYZINE HYDROCHLORIDE 25 MG: 25 TABLET ORAL at 17:02

## 2021-08-29 RX ADMIN — Medication 10 MG: at 20:09

## 2021-08-29 RX ADMIN — AMLODIPINE BESYLATE 5 MG: 5 TABLET ORAL at 08:39

## 2021-08-30 LAB
ALBUMIN SERPL-MCNC: 2.68 G/DL (ref 3.5–5.2)
ALBUMIN/GLOB SERPL: 1 G/DL
ALP SERPL-CCNC: 58 U/L (ref 39–117)
ALT SERPL W P-5'-P-CCNC: 56 U/L (ref 1–41)
ANION GAP SERPL CALCULATED.3IONS-SCNC: 10.2 MMOL/L (ref 5–15)
AST SERPL-CCNC: 29 U/L (ref 1–40)
BILIRUB SERPL-MCNC: 0.4 MG/DL (ref 0–1.2)
BUN SERPL-MCNC: 22 MG/DL (ref 8–23)
BUN/CREAT SERPL: 21.8 (ref 7–25)
CALCIUM SPEC-SCNC: 8 MG/DL (ref 8.6–10.5)
CHLORIDE SERPL-SCNC: 102 MMOL/L (ref 98–107)
CO2 SERPL-SCNC: 20.8 MMOL/L (ref 22–29)
CREAT SERPL-MCNC: 1.01 MG/DL (ref 0.76–1.27)
CRP SERPL-MCNC: 0.74 MG/DL (ref 0–0.5)
D DIMER PPP FEU-MCNC: 0.49 MCGFEU/ML (ref 0–0.5)
DEPRECATED RDW RBC AUTO: 47.8 FL (ref 37–54)
ERYTHROCYTE [DISTWIDTH] IN BLOOD BY AUTOMATED COUNT: 13.9 % (ref 12.3–15.4)
FERRITIN SERPL-MCNC: 830 NG/ML (ref 30–400)
GFR SERPL CREATININE-BSD FRML MDRD: 71 ML/MIN/1.73
GLOBULIN UR ELPH-MCNC: 2.6 GM/DL
GLUCOSE SERPL-MCNC: 179 MG/DL (ref 65–99)
HCT VFR BLD AUTO: 44.9 % (ref 37.5–51)
HGB BLD-MCNC: 14.8 G/DL (ref 13–17.7)
LDH SERPL-CCNC: 355 U/L (ref 135–225)
LYMPHOCYTES # BLD MANUAL: 5.72 10*3/MM3 (ref 0.7–3.1)
LYMPHOCYTES NFR BLD MANUAL: 29 % (ref 19.6–45.3)
LYMPHOCYTES NFR BLD MANUAL: 3 % (ref 5–12)
MCH RBC QN AUTO: 30.5 PG (ref 26.6–33)
MCHC RBC AUTO-ENTMCNC: 33 G/DL (ref 31.5–35.7)
MCV RBC AUTO: 92.4 FL (ref 79–97)
MONOCYTES # BLD AUTO: 0.59 10*3/MM3 (ref 0.1–0.9)
NEUTROPHILS # BLD AUTO: 13.42 10*3/MM3 (ref 1.7–7)
NEUTROPHILS NFR BLD MANUAL: 65 % (ref 42.7–76)
NEUTS BAND NFR BLD MANUAL: 3 % (ref 0–5)
PLAT MORPH BLD: NORMAL
PLATELET # BLD AUTO: 264 10*3/MM3 (ref 140–450)
PMV BLD AUTO: 9.7 FL (ref 6–12)
POTASSIUM SERPL-SCNC: 4.1 MMOL/L (ref 3.5–5.2)
PROT SERPL-MCNC: 5.3 G/DL (ref 6–8.5)
RBC # BLD AUTO: 4.86 10*6/MM3 (ref 4.14–5.8)
RBC MORPH BLD: NORMAL
SMUDGE CELLS BLD QL SMEAR: ABNORMAL
SODIUM SERPL-SCNC: 133 MMOL/L (ref 136–145)
WBC # BLD AUTO: 19.74 10*3/MM3 (ref 3.4–10.8)

## 2021-08-30 PROCEDURE — 25010000002 ENOXAPARIN PER 10 MG: Performed by: STUDENT IN AN ORGANIZED HEALTH CARE EDUCATION/TRAINING PROGRAM

## 2021-08-30 PROCEDURE — 85379 FIBRIN DEGRADATION QUANT: CPT | Performed by: STUDENT IN AN ORGANIZED HEALTH CARE EDUCATION/TRAINING PROGRAM

## 2021-08-30 PROCEDURE — 94799 UNLISTED PULMONARY SVC/PX: CPT

## 2021-08-30 PROCEDURE — 99232 SBSQ HOSP IP/OBS MODERATE 35: CPT | Performed by: INTERNAL MEDICINE

## 2021-08-30 PROCEDURE — 85007 BL SMEAR W/DIFF WBC COUNT: CPT | Performed by: STUDENT IN AN ORGANIZED HEALTH CARE EDUCATION/TRAINING PROGRAM

## 2021-08-30 PROCEDURE — 85025 COMPLETE CBC W/AUTO DIFF WBC: CPT | Performed by: STUDENT IN AN ORGANIZED HEALTH CARE EDUCATION/TRAINING PROGRAM

## 2021-08-30 PROCEDURE — 25010000002 FUROSEMIDE PER 20 MG: Performed by: INTERNAL MEDICINE

## 2021-08-30 PROCEDURE — 82728 ASSAY OF FERRITIN: CPT | Performed by: STUDENT IN AN ORGANIZED HEALTH CARE EDUCATION/TRAINING PROGRAM

## 2021-08-30 PROCEDURE — 83615 LACTATE (LD) (LDH) ENZYME: CPT | Performed by: STUDENT IN AN ORGANIZED HEALTH CARE EDUCATION/TRAINING PROGRAM

## 2021-08-30 PROCEDURE — 63710000001 DEXAMETHASONE PER 0.25 MG: Performed by: STUDENT IN AN ORGANIZED HEALTH CARE EDUCATION/TRAINING PROGRAM

## 2021-08-30 PROCEDURE — 80053 COMPREHEN METABOLIC PANEL: CPT | Performed by: STUDENT IN AN ORGANIZED HEALTH CARE EDUCATION/TRAINING PROGRAM

## 2021-08-30 PROCEDURE — 86140 C-REACTIVE PROTEIN: CPT | Performed by: STUDENT IN AN ORGANIZED HEALTH CARE EDUCATION/TRAINING PROGRAM

## 2021-08-30 RX ORDER — BENZONATATE 100 MG/1
200 CAPSULE ORAL 3 TIMES DAILY
Status: DISCONTINUED | OUTPATIENT
Start: 2021-08-30 | End: 2021-09-04 | Stop reason: HOSPADM

## 2021-08-30 RX ORDER — GUAIFENESIN 600 MG/1
1200 TABLET, EXTENDED RELEASE ORAL EVERY 12 HOURS SCHEDULED
Status: DISCONTINUED | OUTPATIENT
Start: 2021-08-30 | End: 2021-09-04 | Stop reason: HOSPADM

## 2021-08-30 RX ORDER — FUROSEMIDE 10 MG/ML
40 INJECTION INTRAMUSCULAR; INTRAVENOUS ONCE
Status: COMPLETED | OUTPATIENT
Start: 2021-08-30 | End: 2021-08-30

## 2021-08-30 RX ORDER — BUSPIRONE HYDROCHLORIDE 5 MG/1
7.5 TABLET ORAL 3 TIMES DAILY
Status: DISCONTINUED | OUTPATIENT
Start: 2021-08-30 | End: 2021-09-04 | Stop reason: HOSPADM

## 2021-08-30 RX ADMIN — BUSPIRONE HYDROCHLORIDE 7.5 MG: 5 TABLET ORAL at 15:50

## 2021-08-30 RX ADMIN — LEVOTHYROXINE SODIUM 112 MCG: 0.07 TABLET ORAL at 05:14

## 2021-08-30 RX ADMIN — CHOLECALCIFEROL TAB 10 MCG (400 UNIT) 1000 UNITS: 10 TAB at 08:35

## 2021-08-30 RX ADMIN — HYDROCHLOROTHIAZIDE: 12.5 CAPSULE, GELATIN COATED ORAL at 08:34

## 2021-08-30 RX ADMIN — ENOXAPARIN SODIUM 40 MG: 40 INJECTION SUBCUTANEOUS at 15:50

## 2021-08-30 RX ADMIN — Medication 10 MG: at 20:31

## 2021-08-30 RX ADMIN — METOPROLOL SUCCINATE 50 MG: 50 TABLET, EXTENDED RELEASE ORAL at 08:35

## 2021-08-30 RX ADMIN — Medication 500 MG: at 08:34

## 2021-08-30 RX ADMIN — SODIUM CHLORIDE, PRESERVATIVE FREE 10 ML: 5 INJECTION INTRAVENOUS at 08:35

## 2021-08-30 RX ADMIN — BENZONATATE 200 MG: 100 CAPSULE ORAL at 20:31

## 2021-08-30 RX ADMIN — BUDESONIDE AND FORMOTEROL FUMARATE DIHYDRATE 2 PUFF: 160; 4.5 AEROSOL RESPIRATORY (INHALATION) at 07:45

## 2021-08-30 RX ADMIN — BUSPIRONE HYDROCHLORIDE 7.5 MG: 5 TABLET ORAL at 20:31

## 2021-08-30 RX ADMIN — OXYCODONE HYDROCHLORIDE AND ACETAMINOPHEN 500 MG: 500 TABLET ORAL at 08:34

## 2021-08-30 RX ADMIN — AMLODIPINE BESYLATE 5 MG: 5 TABLET ORAL at 08:35

## 2021-08-30 RX ADMIN — FUROSEMIDE 40 MG: 10 INJECTION, SOLUTION INTRAMUSCULAR; INTRAVENOUS at 10:23

## 2021-08-30 RX ADMIN — BUDESONIDE AND FORMOTEROL FUMARATE DIHYDRATE 2 PUFF: 160; 4.5 AEROSOL RESPIRATORY (INHALATION) at 19:04

## 2021-08-30 RX ADMIN — PANTOPRAZOLE SODIUM 40 MG: 40 TABLET, DELAYED RELEASE ORAL at 05:14

## 2021-08-30 RX ADMIN — SODIUM CHLORIDE, PRESERVATIVE FREE 10 ML: 5 INJECTION INTRAVENOUS at 20:31

## 2021-08-30 RX ADMIN — GUAIFENESIN 1200 MG: 600 TABLET, EXTENDED RELEASE ORAL at 10:23

## 2021-08-30 RX ADMIN — MAGNESIUM GLUCONATE 500 MG ORAL TABLET 400 MG: 500 TABLET ORAL at 08:34

## 2021-08-30 RX ADMIN — BENZONATATE 200 MG: 100 CAPSULE ORAL at 10:23

## 2021-08-30 RX ADMIN — HYDROXYZINE HYDROCHLORIDE 25 MG: 25 TABLET ORAL at 05:37

## 2021-08-30 RX ADMIN — ALBUTEROL SULFATE 2 PUFF: 90 AEROSOL, METERED RESPIRATORY (INHALATION) at 05:17

## 2021-08-30 RX ADMIN — GUAIFENESIN 1200 MG: 600 TABLET, EXTENDED RELEASE ORAL at 20:31

## 2021-08-30 RX ADMIN — BENZONATATE 200 MG: 100 CAPSULE ORAL at 15:53

## 2021-08-30 RX ADMIN — DEXAMETHASONE 6 MG: 4 TABLET ORAL at 08:35

## 2021-08-31 LAB
ALBUMIN SERPL-MCNC: 2.71 G/DL (ref 3.5–5.2)
ALBUMIN/GLOB SERPL: 0.9 G/DL
ALP SERPL-CCNC: 61 U/L (ref 39–117)
ALT SERPL W P-5'-P-CCNC: 57 U/L (ref 1–41)
ANION GAP SERPL CALCULATED.3IONS-SCNC: 7.4 MMOL/L (ref 5–15)
AST SERPL-CCNC: 27 U/L (ref 1–40)
BILIRUB SERPL-MCNC: 0.4 MG/DL (ref 0–1.2)
BUN SERPL-MCNC: 24 MG/DL (ref 8–23)
BUN/CREAT SERPL: 21.2 (ref 7–25)
CALCIUM SPEC-SCNC: 8.1 MG/DL (ref 8.6–10.5)
CHLORIDE SERPL-SCNC: 101 MMOL/L (ref 98–107)
CO2 SERPL-SCNC: 25.6 MMOL/L (ref 22–29)
CREAT SERPL-MCNC: 1.13 MG/DL (ref 0.76–1.27)
CRP SERPL-MCNC: 0.65 MG/DL (ref 0–0.5)
DEPRECATED RDW RBC AUTO: 44.6 FL (ref 37–54)
ERYTHROCYTE [DISTWIDTH] IN BLOOD BY AUTOMATED COUNT: 13.8 % (ref 12.3–15.4)
GFR SERPL CREATININE-BSD FRML MDRD: 62 ML/MIN/1.73
GLOBULIN UR ELPH-MCNC: 3 GM/DL
GLUCOSE BLDC GLUCOMTR-MCNC: 151 MG/DL (ref 70–130)
GLUCOSE SERPL-MCNC: 137 MG/DL (ref 65–99)
HCT VFR BLD AUTO: 44.3 % (ref 37.5–51)
HGB BLD-MCNC: 14.8 G/DL (ref 13–17.7)
LYMPHOCYTES # BLD MANUAL: 4.33 10*3/MM3 (ref 0.7–3.1)
LYMPHOCYTES NFR BLD MANUAL: 21 % (ref 19.6–45.3)
LYMPHOCYTES NFR BLD MANUAL: 7 % (ref 5–12)
MAGNESIUM SERPL-MCNC: 2.3 MG/DL (ref 1.6–2.4)
MCH RBC QN AUTO: 29.8 PG (ref 26.6–33)
MCHC RBC AUTO-ENTMCNC: 33.4 G/DL (ref 31.5–35.7)
MCV RBC AUTO: 89.1 FL (ref 79–97)
MONOCYTES # BLD AUTO: 1.44 10*3/MM3 (ref 0.1–0.9)
NEUTROPHILS # BLD AUTO: 14.85 10*3/MM3 (ref 1.7–7)
NEUTROPHILS NFR BLD MANUAL: 72 % (ref 42.7–76)
PLAT MORPH BLD: NORMAL
PLATELET # BLD AUTO: 274 10*3/MM3 (ref 140–450)
PMV BLD AUTO: 9.6 FL (ref 6–12)
POTASSIUM SERPL-SCNC: 4.4 MMOL/L (ref 3.5–5.2)
PROT SERPL-MCNC: 5.7 G/DL (ref 6–8.5)
RBC # BLD AUTO: 4.97 10*6/MM3 (ref 4.14–5.8)
RBC MORPH BLD: NORMAL
SODIUM SERPL-SCNC: 134 MMOL/L (ref 136–145)
WBC # BLD AUTO: 20.62 10*3/MM3 (ref 3.4–10.8)

## 2021-08-31 PROCEDURE — 85025 COMPLETE CBC W/AUTO DIFF WBC: CPT | Performed by: INTERNAL MEDICINE

## 2021-08-31 PROCEDURE — 25010000002 FUROSEMIDE PER 20 MG: Performed by: INTERNAL MEDICINE

## 2021-08-31 PROCEDURE — 86140 C-REACTIVE PROTEIN: CPT | Performed by: INTERNAL MEDICINE

## 2021-08-31 PROCEDURE — 83735 ASSAY OF MAGNESIUM: CPT | Performed by: INTERNAL MEDICINE

## 2021-08-31 PROCEDURE — 63710000001 DEXAMETHASONE PER 0.25 MG: Performed by: STUDENT IN AN ORGANIZED HEALTH CARE EDUCATION/TRAINING PROGRAM

## 2021-08-31 PROCEDURE — 94799 UNLISTED PULMONARY SVC/PX: CPT

## 2021-08-31 PROCEDURE — 80053 COMPREHEN METABOLIC PANEL: CPT | Performed by: INTERNAL MEDICINE

## 2021-08-31 PROCEDURE — 25010000002 ENOXAPARIN PER 10 MG: Performed by: STUDENT IN AN ORGANIZED HEALTH CARE EDUCATION/TRAINING PROGRAM

## 2021-08-31 PROCEDURE — 99232 SBSQ HOSP IP/OBS MODERATE 35: CPT | Performed by: INTERNAL MEDICINE

## 2021-08-31 PROCEDURE — 82962 GLUCOSE BLOOD TEST: CPT

## 2021-08-31 PROCEDURE — 85007 BL SMEAR W/DIFF WBC COUNT: CPT | Performed by: INTERNAL MEDICINE

## 2021-08-31 RX ORDER — FUROSEMIDE 10 MG/ML
40 INJECTION INTRAMUSCULAR; INTRAVENOUS ONCE
Status: COMPLETED | OUTPATIENT
Start: 2021-08-31 | End: 2021-08-31

## 2021-08-31 RX ADMIN — GUAIFENESIN 1200 MG: 600 TABLET, EXTENDED RELEASE ORAL at 20:33

## 2021-08-31 RX ADMIN — METOPROLOL SUCCINATE 50 MG: 50 TABLET, EXTENDED RELEASE ORAL at 08:30

## 2021-08-31 RX ADMIN — BUSPIRONE HYDROCHLORIDE 7.5 MG: 5 TABLET ORAL at 08:32

## 2021-08-31 RX ADMIN — BUSPIRONE HYDROCHLORIDE 7.5 MG: 5 TABLET ORAL at 20:33

## 2021-08-31 RX ADMIN — ALBUTEROL SULFATE 2 PUFF: 90 AEROSOL, METERED RESPIRATORY (INHALATION) at 11:05

## 2021-08-31 RX ADMIN — BENZONATATE 200 MG: 100 CAPSULE ORAL at 08:31

## 2021-08-31 RX ADMIN — LEVOTHYROXINE SODIUM 112 MCG: 0.07 TABLET ORAL at 05:18

## 2021-08-31 RX ADMIN — BENZONATATE 200 MG: 100 CAPSULE ORAL at 17:12

## 2021-08-31 RX ADMIN — BUSPIRONE HYDROCHLORIDE 7.5 MG: 5 TABLET ORAL at 17:12

## 2021-08-31 RX ADMIN — SODIUM CHLORIDE, PRESERVATIVE FREE 10 ML: 5 INJECTION INTRAVENOUS at 08:32

## 2021-08-31 RX ADMIN — MAGNESIUM GLUCONATE 500 MG ORAL TABLET 400 MG: 500 TABLET ORAL at 08:31

## 2021-08-31 RX ADMIN — BENZONATATE 200 MG: 100 CAPSULE ORAL at 20:33

## 2021-08-31 RX ADMIN — GUAIFENESIN 1200 MG: 600 TABLET, EXTENDED RELEASE ORAL at 08:31

## 2021-08-31 RX ADMIN — HYDROXYZINE HYDROCHLORIDE 25 MG: 25 TABLET ORAL at 22:31

## 2021-08-31 RX ADMIN — ENOXAPARIN SODIUM 40 MG: 40 INJECTION SUBCUTANEOUS at 17:12

## 2021-08-31 RX ADMIN — Medication 10 MG: at 20:33

## 2021-08-31 RX ADMIN — HYDROCHLOROTHIAZIDE: 12.5 CAPSULE, GELATIN COATED ORAL at 08:31

## 2021-08-31 RX ADMIN — OXYCODONE HYDROCHLORIDE AND ACETAMINOPHEN 500 MG: 500 TABLET ORAL at 08:31

## 2021-08-31 RX ADMIN — AMLODIPINE BESYLATE 5 MG: 5 TABLET ORAL at 08:31

## 2021-08-31 RX ADMIN — FUROSEMIDE 40 MG: 10 INJECTION, SOLUTION INTRAMUSCULAR; INTRAVENOUS at 11:01

## 2021-08-31 RX ADMIN — PANTOPRAZOLE SODIUM 40 MG: 40 TABLET, DELAYED RELEASE ORAL at 05:18

## 2021-08-31 RX ADMIN — Medication 500 MG: at 08:31

## 2021-08-31 RX ADMIN — CHOLECALCIFEROL TAB 10 MCG (400 UNIT) 1000 UNITS: 10 TAB at 08:31

## 2021-08-31 RX ADMIN — BUDESONIDE AND FORMOTEROL FUMARATE DIHYDRATE 2 PUFF: 160; 4.5 AEROSOL RESPIRATORY (INHALATION) at 08:32

## 2021-08-31 RX ADMIN — BUDESONIDE AND FORMOTEROL FUMARATE DIHYDRATE 2 PUFF: 160; 4.5 AEROSOL RESPIRATORY (INHALATION) at 20:34

## 2021-08-31 RX ADMIN — DEXAMETHASONE 6 MG: 4 TABLET ORAL at 08:32

## 2021-09-01 LAB
ALBUMIN SERPL-MCNC: 2.66 G/DL (ref 3.5–5.2)
ALBUMIN/GLOB SERPL: 1 G/DL
ALP SERPL-CCNC: 59 U/L (ref 39–117)
ALT SERPL W P-5'-P-CCNC: 58 U/L (ref 1–41)
ANION GAP SERPL CALCULATED.3IONS-SCNC: 10.2 MMOL/L (ref 5–15)
AST SERPL-CCNC: 24 U/L (ref 1–40)
BILIRUB SERPL-MCNC: 0.5 MG/DL (ref 0–1.2)
BUN SERPL-MCNC: 26 MG/DL (ref 8–23)
BUN/CREAT SERPL: 23.4 (ref 7–25)
CALCIUM SPEC-SCNC: 7.9 MG/DL (ref 8.6–10.5)
CHLORIDE SERPL-SCNC: 101 MMOL/L (ref 98–107)
CO2 SERPL-SCNC: 23.8 MMOL/L (ref 22–29)
CREAT SERPL-MCNC: 1.11 MG/DL (ref 0.76–1.27)
CRP SERPL-MCNC: 0.47 MG/DL (ref 0–0.5)
D DIMER PPP FEU-MCNC: 0.45 MCGFEU/ML (ref 0–0.5)
DEPRECATED RDW RBC AUTO: 46.1 FL (ref 37–54)
ERYTHROCYTE [DISTWIDTH] IN BLOOD BY AUTOMATED COUNT: 14 % (ref 12.3–15.4)
GFR SERPL CREATININE-BSD FRML MDRD: 63 ML/MIN/1.73
GLOBULIN UR ELPH-MCNC: 2.7 GM/DL
GLUCOSE SERPL-MCNC: 119 MG/DL (ref 65–99)
HCT VFR BLD AUTO: 47.5 % (ref 37.5–51)
HGB BLD-MCNC: 15.9 G/DL (ref 13–17.7)
LDH SERPL-CCNC: 326 U/L (ref 135–225)
LYMPHOCYTES # BLD MANUAL: 5.54 10*3/MM3 (ref 0.7–3.1)
LYMPHOCYTES NFR BLD MANUAL: 2 % (ref 5–12)
LYMPHOCYTES NFR BLD MANUAL: 28 % (ref 19.6–45.3)
MCH RBC QN AUTO: 30.3 PG (ref 26.6–33)
MCHC RBC AUTO-ENTMCNC: 33.5 G/DL (ref 31.5–35.7)
MCV RBC AUTO: 90.6 FL (ref 79–97)
MONOCYTES # BLD AUTO: 0.4 10*3/MM3 (ref 0.1–0.9)
NEUTROPHILS # BLD AUTO: 13.86 10*3/MM3 (ref 1.7–7)
NEUTROPHILS NFR BLD MANUAL: 69 % (ref 42.7–76)
NEUTS BAND NFR BLD MANUAL: 1 % (ref 0–5)
PLAT MORPH BLD: NORMAL
PLATELET # BLD AUTO: 245 10*3/MM3 (ref 140–450)
PMV BLD AUTO: 10.1 FL (ref 6–12)
POTASSIUM SERPL-SCNC: 4.1 MMOL/L (ref 3.5–5.2)
PROT SERPL-MCNC: 5.4 G/DL (ref 6–8.5)
RBC # BLD AUTO: 5.24 10*6/MM3 (ref 4.14–5.8)
RBC MORPH BLD: NORMAL
SODIUM SERPL-SCNC: 135 MMOL/L (ref 136–145)
WBC # BLD AUTO: 19.8 10*3/MM3 (ref 3.4–10.8)

## 2021-09-01 PROCEDURE — 99232 SBSQ HOSP IP/OBS MODERATE 35: CPT | Performed by: INTERNAL MEDICINE

## 2021-09-01 PROCEDURE — 86140 C-REACTIVE PROTEIN: CPT | Performed by: INTERNAL MEDICINE

## 2021-09-01 PROCEDURE — 85379 FIBRIN DEGRADATION QUANT: CPT | Performed by: STUDENT IN AN ORGANIZED HEALTH CARE EDUCATION/TRAINING PROGRAM

## 2021-09-01 PROCEDURE — 25010000002 DEXAMETHASONE PER 1 MG: Performed by: STUDENT IN AN ORGANIZED HEALTH CARE EDUCATION/TRAINING PROGRAM

## 2021-09-01 PROCEDURE — 94799 UNLISTED PULMONARY SVC/PX: CPT

## 2021-09-01 PROCEDURE — 85007 BL SMEAR W/DIFF WBC COUNT: CPT | Performed by: INTERNAL MEDICINE

## 2021-09-01 PROCEDURE — 83615 LACTATE (LD) (LDH) ENZYME: CPT | Performed by: STUDENT IN AN ORGANIZED HEALTH CARE EDUCATION/TRAINING PROGRAM

## 2021-09-01 PROCEDURE — 25010000002 ENOXAPARIN PER 10 MG: Performed by: STUDENT IN AN ORGANIZED HEALTH CARE EDUCATION/TRAINING PROGRAM

## 2021-09-01 PROCEDURE — 25010000002 FUROSEMIDE PER 20 MG: Performed by: INTERNAL MEDICINE

## 2021-09-01 PROCEDURE — 85025 COMPLETE CBC W/AUTO DIFF WBC: CPT | Performed by: INTERNAL MEDICINE

## 2021-09-01 PROCEDURE — 80053 COMPREHEN METABOLIC PANEL: CPT | Performed by: INTERNAL MEDICINE

## 2021-09-01 RX ORDER — FUROSEMIDE 10 MG/ML
40 INJECTION INTRAMUSCULAR; INTRAVENOUS ONCE
Status: COMPLETED | OUTPATIENT
Start: 2021-09-01 | End: 2021-09-01

## 2021-09-01 RX ADMIN — FUROSEMIDE 40 MG: 10 INJECTION INTRAMUSCULAR; INTRAVENOUS at 11:56

## 2021-09-01 RX ADMIN — GUAIFENESIN 1200 MG: 600 TABLET, EXTENDED RELEASE ORAL at 09:25

## 2021-09-01 RX ADMIN — OXYCODONE HYDROCHLORIDE AND ACETAMINOPHEN 500 MG: 500 TABLET ORAL at 09:26

## 2021-09-01 RX ADMIN — BENZONATATE 200 MG: 100 CAPSULE ORAL at 09:23

## 2021-09-01 RX ADMIN — CHOLECALCIFEROL TAB 10 MCG (400 UNIT) 1000 UNITS: 10 TAB at 11:56

## 2021-09-01 RX ADMIN — HYDROXYZINE HYDROCHLORIDE 25 MG: 25 TABLET ORAL at 22:01

## 2021-09-01 RX ADMIN — HYDROCHLOROTHIAZIDE: 12.5 CAPSULE, GELATIN COATED ORAL at 11:56

## 2021-09-01 RX ADMIN — BUSPIRONE HYDROCHLORIDE 7.5 MG: 5 TABLET ORAL at 17:00

## 2021-09-01 RX ADMIN — BUDESONIDE AND FORMOTEROL FUMARATE DIHYDRATE 2 PUFF: 160; 4.5 AEROSOL RESPIRATORY (INHALATION) at 20:19

## 2021-09-01 RX ADMIN — METOPROLOL SUCCINATE 50 MG: 50 TABLET, EXTENDED RELEASE ORAL at 09:25

## 2021-09-01 RX ADMIN — LEVOTHYROXINE SODIUM 112 MCG: 0.07 TABLET ORAL at 05:14

## 2021-09-01 RX ADMIN — ENOXAPARIN SODIUM 40 MG: 40 INJECTION SUBCUTANEOUS at 17:00

## 2021-09-01 RX ADMIN — MAGNESIUM GLUCONATE 500 MG ORAL TABLET 400 MG: 500 TABLET ORAL at 09:25

## 2021-09-01 RX ADMIN — BUSPIRONE HYDROCHLORIDE 7.5 MG: 5 TABLET ORAL at 09:25

## 2021-09-01 RX ADMIN — Medication 500 MG: at 09:26

## 2021-09-01 RX ADMIN — SODIUM CHLORIDE, PRESERVATIVE FREE 10 ML: 5 INJECTION INTRAVENOUS at 09:33

## 2021-09-01 RX ADMIN — BUDESONIDE AND FORMOTEROL FUMARATE DIHYDRATE 2 PUFF: 160; 4.5 AEROSOL RESPIRATORY (INHALATION) at 09:38

## 2021-09-01 RX ADMIN — BENZONATATE 200 MG: 100 CAPSULE ORAL at 17:00

## 2021-09-01 RX ADMIN — BUSPIRONE HYDROCHLORIDE 7.5 MG: 5 TABLET ORAL at 20:15

## 2021-09-01 RX ADMIN — AMLODIPINE BESYLATE 5 MG: 5 TABLET ORAL at 09:23

## 2021-09-01 RX ADMIN — BENZONATATE 200 MG: 100 CAPSULE ORAL at 20:14

## 2021-09-01 RX ADMIN — PANTOPRAZOLE SODIUM 40 MG: 40 TABLET, DELAYED RELEASE ORAL at 05:14

## 2021-09-01 RX ADMIN — GUAIFENESIN 1200 MG: 600 TABLET, EXTENDED RELEASE ORAL at 20:14

## 2021-09-01 RX ADMIN — SODIUM CHLORIDE, PRESERVATIVE FREE 10 ML: 5 INJECTION INTRAVENOUS at 20:15

## 2021-09-01 RX ADMIN — Medication 10 MG: at 20:14

## 2021-09-01 RX ADMIN — DEXAMETHASONE SODIUM PHOSPHATE 6 MG: 4 INJECTION, SOLUTION INTRA-ARTICULAR; INTRALESIONAL; INTRAMUSCULAR; INTRAVENOUS; SOFT TISSUE at 09:26

## 2021-09-02 LAB
ALBUMIN SERPL-MCNC: 2.73 G/DL (ref 3.5–5.2)
ALBUMIN/GLOB SERPL: 1 G/DL
ALP SERPL-CCNC: 57 U/L (ref 39–117)
ALT SERPL W P-5'-P-CCNC: 49 U/L (ref 1–41)
ANION GAP SERPL CALCULATED.3IONS-SCNC: 10.3 MMOL/L (ref 5–15)
AST SERPL-CCNC: 16 U/L (ref 1–40)
BILIRUB SERPL-MCNC: 0.4 MG/DL (ref 0–1.2)
BUN SERPL-MCNC: 34 MG/DL (ref 8–23)
BUN/CREAT SERPL: 28.1 (ref 7–25)
CALCIUM SPEC-SCNC: 7.9 MG/DL (ref 8.6–10.5)
CHLORIDE SERPL-SCNC: 101 MMOL/L (ref 98–107)
CO2 SERPL-SCNC: 22.7 MMOL/L (ref 22–29)
CREAT SERPL-MCNC: 1.21 MG/DL (ref 0.76–1.27)
CRP SERPL-MCNC: 0.32 MG/DL (ref 0–0.5)
CYTOLOGIST CVX/VAG CYTO: NORMAL
DEPRECATED RDW RBC AUTO: 47.8 FL (ref 37–54)
ERYTHROCYTE [DISTWIDTH] IN BLOOD BY AUTOMATED COUNT: 14 % (ref 12.3–15.4)
GFR SERPL CREATININE-BSD FRML MDRD: 57 ML/MIN/1.73
GLOBULIN UR ELPH-MCNC: 2.7 GM/DL
GLUCOSE BLDC GLUCOMTR-MCNC: 137 MG/DL (ref 70–130)
GLUCOSE SERPL-MCNC: 194 MG/DL (ref 65–99)
HCT VFR BLD AUTO: 45.6 % (ref 37.5–51)
HGB BLD-MCNC: 14.9 G/DL (ref 13–17.7)
LYMPHOCYTES # BLD MANUAL: 6.2 10*3/MM3 (ref 0.7–3.1)
LYMPHOCYTES NFR BLD MANUAL: 2 % (ref 5–12)
LYMPHOCYTES NFR BLD MANUAL: 27 % (ref 19.6–45.3)
MCH RBC QN AUTO: 30.2 PG (ref 26.6–33)
MCHC RBC AUTO-ENTMCNC: 32.7 G/DL (ref 31.5–35.7)
MCV RBC AUTO: 92.3 FL (ref 79–97)
MONOCYTES # BLD AUTO: 0.46 10*3/MM3 (ref 0.1–0.9)
NEUTROPHILS # BLD AUTO: 16.31 10*3/MM3 (ref 1.7–7)
NEUTROPHILS NFR BLD MANUAL: 70 % (ref 42.7–76)
NEUTS BAND NFR BLD MANUAL: 1 % (ref 0–5)
PATH INTERP BLD-IMP: NORMAL
PLAT MORPH BLD: NORMAL
PLATELET # BLD AUTO: 316 10*3/MM3 (ref 140–450)
PMV BLD AUTO: 10.2 FL (ref 6–12)
POTASSIUM SERPL-SCNC: 3.6 MMOL/L (ref 3.5–5.2)
POTASSIUM SERPL-SCNC: 4.4 MMOL/L (ref 3.5–5.2)
PROT SERPL-MCNC: 5.4 G/DL (ref 6–8.5)
RBC # BLD AUTO: 4.94 10*6/MM3 (ref 4.14–5.8)
RBC MORPH BLD: NORMAL
SMUDGE CELLS BLD QL SMEAR: ABNORMAL
SODIUM SERPL-SCNC: 134 MMOL/L (ref 136–145)
WBC # BLD AUTO: 22.97 10*3/MM3 (ref 3.4–10.8)

## 2021-09-02 PROCEDURE — 86140 C-REACTIVE PROTEIN: CPT | Performed by: INTERNAL MEDICINE

## 2021-09-02 PROCEDURE — 25010000002 ENOXAPARIN PER 10 MG: Performed by: STUDENT IN AN ORGANIZED HEALTH CARE EDUCATION/TRAINING PROGRAM

## 2021-09-02 PROCEDURE — 94799 UNLISTED PULMONARY SVC/PX: CPT

## 2021-09-02 PROCEDURE — 82962 GLUCOSE BLOOD TEST: CPT

## 2021-09-02 PROCEDURE — 80053 COMPREHEN METABOLIC PANEL: CPT | Performed by: INTERNAL MEDICINE

## 2021-09-02 PROCEDURE — 84132 ASSAY OF SERUM POTASSIUM: CPT | Performed by: INTERNAL MEDICINE

## 2021-09-02 PROCEDURE — 85007 BL SMEAR W/DIFF WBC COUNT: CPT | Performed by: INTERNAL MEDICINE

## 2021-09-02 PROCEDURE — 25010000002 DEXAMETHASONE PER 1 MG: Performed by: STUDENT IN AN ORGANIZED HEALTH CARE EDUCATION/TRAINING PROGRAM

## 2021-09-02 PROCEDURE — 85025 COMPLETE CBC W/AUTO DIFF WBC: CPT | Performed by: INTERNAL MEDICINE

## 2021-09-02 PROCEDURE — 25010000002 FUROSEMIDE PER 20 MG: Performed by: INTERNAL MEDICINE

## 2021-09-02 PROCEDURE — 99232 SBSQ HOSP IP/OBS MODERATE 35: CPT | Performed by: INTERNAL MEDICINE

## 2021-09-02 RX ORDER — POTASSIUM CHLORIDE 20 MEQ/1
40 TABLET, EXTENDED RELEASE ORAL EVERY 4 HOURS
Status: COMPLETED | OUTPATIENT
Start: 2021-09-02 | End: 2021-09-02

## 2021-09-02 RX ORDER — FUROSEMIDE 10 MG/ML
40 INJECTION INTRAMUSCULAR; INTRAVENOUS ONCE
Status: COMPLETED | OUTPATIENT
Start: 2021-09-02 | End: 2021-09-02

## 2021-09-02 RX ADMIN — SODIUM CHLORIDE, PRESERVATIVE FREE 10 ML: 5 INJECTION INTRAVENOUS at 09:24

## 2021-09-02 RX ADMIN — BENZONATATE 200 MG: 100 CAPSULE ORAL at 09:24

## 2021-09-02 RX ADMIN — PANTOPRAZOLE SODIUM 40 MG: 40 TABLET, DELAYED RELEASE ORAL at 05:22

## 2021-09-02 RX ADMIN — HYDROXYZINE HYDROCHLORIDE 25 MG: 25 TABLET ORAL at 22:10

## 2021-09-02 RX ADMIN — OXYCODONE HYDROCHLORIDE AND ACETAMINOPHEN 500 MG: 500 TABLET ORAL at 09:23

## 2021-09-02 RX ADMIN — Medication 500 MG: at 09:23

## 2021-09-02 RX ADMIN — BUDESONIDE AND FORMOTEROL FUMARATE DIHYDRATE 2 PUFF: 160; 4.5 AEROSOL RESPIRATORY (INHALATION) at 20:11

## 2021-09-02 RX ADMIN — METOPROLOL SUCCINATE 50 MG: 50 TABLET, EXTENDED RELEASE ORAL at 09:23

## 2021-09-02 RX ADMIN — POTASSIUM CHLORIDE 40 MEQ: 20 TABLET, EXTENDED RELEASE ORAL at 12:09

## 2021-09-02 RX ADMIN — Medication 10 MG: at 20:11

## 2021-09-02 RX ADMIN — MAGNESIUM GLUCONATE 500 MG ORAL TABLET 400 MG: 500 TABLET ORAL at 09:23

## 2021-09-02 RX ADMIN — FUROSEMIDE 40 MG: 10 INJECTION INTRAMUSCULAR; INTRAVENOUS at 09:22

## 2021-09-02 RX ADMIN — BUSPIRONE HYDROCHLORIDE 7.5 MG: 5 TABLET ORAL at 09:24

## 2021-09-02 RX ADMIN — BUSPIRONE HYDROCHLORIDE 7.5 MG: 5 TABLET ORAL at 20:11

## 2021-09-02 RX ADMIN — LEVOTHYROXINE SODIUM 112 MCG: 0.07 TABLET ORAL at 05:23

## 2021-09-02 RX ADMIN — BENZONATATE 200 MG: 100 CAPSULE ORAL at 15:31

## 2021-09-02 RX ADMIN — BUSPIRONE HYDROCHLORIDE 7.5 MG: 5 TABLET ORAL at 15:31

## 2021-09-02 RX ADMIN — DEXAMETHASONE SODIUM PHOSPHATE 6 MG: 4 INJECTION, SOLUTION INTRA-ARTICULAR; INTRALESIONAL; INTRAMUSCULAR; INTRAVENOUS; SOFT TISSUE at 09:22

## 2021-09-02 RX ADMIN — CHOLECALCIFEROL TAB 10 MCG (400 UNIT) 1000 UNITS: 10 TAB at 09:23

## 2021-09-02 RX ADMIN — SODIUM CHLORIDE, PRESERVATIVE FREE 10 ML: 5 INJECTION INTRAVENOUS at 20:12

## 2021-09-02 RX ADMIN — GUAIFENESIN 1200 MG: 600 TABLET, EXTENDED RELEASE ORAL at 09:23

## 2021-09-02 RX ADMIN — AMLODIPINE BESYLATE 5 MG: 5 TABLET ORAL at 09:23

## 2021-09-02 RX ADMIN — BUDESONIDE AND FORMOTEROL FUMARATE DIHYDRATE 2 PUFF: 160; 4.5 AEROSOL RESPIRATORY (INHALATION) at 07:50

## 2021-09-02 RX ADMIN — ENOXAPARIN SODIUM 40 MG: 40 INJECTION SUBCUTANEOUS at 18:07

## 2021-09-02 RX ADMIN — BENZONATATE 200 MG: 100 CAPSULE ORAL at 20:11

## 2021-09-02 RX ADMIN — GUAIFENESIN 1200 MG: 600 TABLET, EXTENDED RELEASE ORAL at 20:11

## 2021-09-02 RX ADMIN — POTASSIUM CHLORIDE 40 MEQ: 20 TABLET, EXTENDED RELEASE ORAL at 09:22

## 2021-09-02 RX ADMIN — HYDROCHLOROTHIAZIDE: 12.5 CAPSULE, GELATIN COATED ORAL at 09:23

## 2021-09-03 LAB
ALBUMIN SERPL-MCNC: 2.88 G/DL (ref 3.5–5.2)
ALBUMIN/GLOB SERPL: 1.1 G/DL
ALP SERPL-CCNC: 55 U/L (ref 39–117)
ALT SERPL W P-5'-P-CCNC: 42 U/L (ref 1–41)
ANION GAP SERPL CALCULATED.3IONS-SCNC: 6.8 MMOL/L (ref 5–15)
AST SERPL-CCNC: 14 U/L (ref 1–40)
BILIRUB SERPL-MCNC: 0.4 MG/DL (ref 0–1.2)
BUN SERPL-MCNC: 37 MG/DL (ref 8–23)
BUN/CREAT SERPL: 25.2 (ref 7–25)
CALCIUM SPEC-SCNC: 8 MG/DL (ref 8.6–10.5)
CHLORIDE SERPL-SCNC: 102 MMOL/L (ref 98–107)
CO2 SERPL-SCNC: 26.2 MMOL/L (ref 22–29)
CREAT SERPL-MCNC: 1.47 MG/DL (ref 0.76–1.27)
D DIMER PPP FEU-MCNC: 0.4 MCGFEU/ML (ref 0–0.5)
DEPRECATED RDW RBC AUTO: 47.4 FL (ref 37–54)
EOSINOPHIL # BLD MANUAL: 0.23 10*3/MM3 (ref 0–0.4)
EOSINOPHIL NFR BLD MANUAL: 1 % (ref 0.3–6.2)
ERYTHROCYTE [DISTWIDTH] IN BLOOD BY AUTOMATED COUNT: 13.9 % (ref 12.3–15.4)
GFR SERPL CREATININE-BSD FRML MDRD: 46 ML/MIN/1.73
GLOBULIN UR ELPH-MCNC: 2.6 GM/DL
GLUCOSE SERPL-MCNC: 151 MG/DL (ref 65–99)
HCT VFR BLD AUTO: 44.6 % (ref 37.5–51)
HGB BLD-MCNC: 14.8 G/DL (ref 13–17.7)
LDH SERPL-CCNC: 265 U/L (ref 135–225)
LYMPHOCYTES # BLD MANUAL: 8.36 10*3/MM3 (ref 0.7–3.1)
LYMPHOCYTES NFR BLD MANUAL: 37 % (ref 19.6–45.3)
LYMPHOCYTES NFR BLD MANUAL: 7 % (ref 5–12)
MAGNESIUM SERPL-MCNC: 2.5 MG/DL (ref 1.6–2.4)
MCH RBC QN AUTO: 30.2 PG (ref 26.6–33)
MCHC RBC AUTO-ENTMCNC: 33.2 G/DL (ref 31.5–35.7)
MCV RBC AUTO: 91 FL (ref 79–97)
MONOCYTES # BLD AUTO: 1.58 10*3/MM3 (ref 0.1–0.9)
NEUTROPHILS # BLD AUTO: 12.43 10*3/MM3 (ref 1.7–7)
NEUTROPHILS NFR BLD MANUAL: 52 % (ref 42.7–76)
NEUTS BAND NFR BLD MANUAL: 3 % (ref 0–5)
PLAT MORPH BLD: NORMAL
PLATELET # BLD AUTO: 295 10*3/MM3 (ref 140–450)
PMV BLD AUTO: 9.9 FL (ref 6–12)
POTASSIUM SERPL-SCNC: 4.5 MMOL/L (ref 3.5–5.2)
PROT SERPL-MCNC: 5.5 G/DL (ref 6–8.5)
RBC # BLD AUTO: 4.9 10*6/MM3 (ref 4.14–5.8)
RBC MORPH BLD: NORMAL
SODIUM SERPL-SCNC: 135 MMOL/L (ref 136–145)
WBC # BLD AUTO: 22.6 10*3/MM3 (ref 3.4–10.8)

## 2021-09-03 PROCEDURE — 85007 BL SMEAR W/DIFF WBC COUNT: CPT | Performed by: INTERNAL MEDICINE

## 2021-09-03 PROCEDURE — 83735 ASSAY OF MAGNESIUM: CPT | Performed by: INTERNAL MEDICINE

## 2021-09-03 PROCEDURE — 85379 FIBRIN DEGRADATION QUANT: CPT | Performed by: STUDENT IN AN ORGANIZED HEALTH CARE EDUCATION/TRAINING PROGRAM

## 2021-09-03 PROCEDURE — 94799 UNLISTED PULMONARY SVC/PX: CPT

## 2021-09-03 PROCEDURE — 85025 COMPLETE CBC W/AUTO DIFF WBC: CPT | Performed by: INTERNAL MEDICINE

## 2021-09-03 PROCEDURE — 25010000002 ENOXAPARIN PER 10 MG: Performed by: STUDENT IN AN ORGANIZED HEALTH CARE EDUCATION/TRAINING PROGRAM

## 2021-09-03 PROCEDURE — 83615 LACTATE (LD) (LDH) ENZYME: CPT | Performed by: STUDENT IN AN ORGANIZED HEALTH CARE EDUCATION/TRAINING PROGRAM

## 2021-09-03 PROCEDURE — 25010000002 DEXAMETHASONE PER 1 MG: Performed by: STUDENT IN AN ORGANIZED HEALTH CARE EDUCATION/TRAINING PROGRAM

## 2021-09-03 PROCEDURE — 80053 COMPREHEN METABOLIC PANEL: CPT | Performed by: INTERNAL MEDICINE

## 2021-09-03 PROCEDURE — 99232 SBSQ HOSP IP/OBS MODERATE 35: CPT | Performed by: INTERNAL MEDICINE

## 2021-09-03 RX ADMIN — HYDROCHLOROTHIAZIDE: 12.5 CAPSULE, GELATIN COATED ORAL at 09:22

## 2021-09-03 RX ADMIN — DEXAMETHASONE SODIUM PHOSPHATE 6 MG: 4 INJECTION, SOLUTION INTRA-ARTICULAR; INTRALESIONAL; INTRAMUSCULAR; INTRAVENOUS; SOFT TISSUE at 09:21

## 2021-09-03 RX ADMIN — Medication 10 MG: at 20:47

## 2021-09-03 RX ADMIN — AMLODIPINE BESYLATE 5 MG: 5 TABLET ORAL at 09:22

## 2021-09-03 RX ADMIN — MAGNESIUM GLUCONATE 500 MG ORAL TABLET 400 MG: 500 TABLET ORAL at 09:22

## 2021-09-03 RX ADMIN — BUDESONIDE AND FORMOTEROL FUMARATE DIHYDRATE 2 PUFF: 160; 4.5 AEROSOL RESPIRATORY (INHALATION) at 19:50

## 2021-09-03 RX ADMIN — PANTOPRAZOLE SODIUM 40 MG: 40 TABLET, DELAYED RELEASE ORAL at 05:01

## 2021-09-03 RX ADMIN — ENOXAPARIN SODIUM 40 MG: 40 INJECTION SUBCUTANEOUS at 18:51

## 2021-09-03 RX ADMIN — BENZONATATE 200 MG: 100 CAPSULE ORAL at 20:48

## 2021-09-03 RX ADMIN — BUSPIRONE HYDROCHLORIDE 7.5 MG: 5 TABLET ORAL at 09:22

## 2021-09-03 RX ADMIN — SODIUM CHLORIDE, PRESERVATIVE FREE 10 ML: 5 INJECTION INTRAVENOUS at 20:48

## 2021-09-03 RX ADMIN — BUSPIRONE HYDROCHLORIDE 7.5 MG: 5 TABLET ORAL at 15:45

## 2021-09-03 RX ADMIN — BUSPIRONE HYDROCHLORIDE 7.5 MG: 5 TABLET ORAL at 20:48

## 2021-09-03 RX ADMIN — HYDROXYZINE HYDROCHLORIDE 25 MG: 25 TABLET ORAL at 23:06

## 2021-09-03 RX ADMIN — Medication 500 MG: at 09:22

## 2021-09-03 RX ADMIN — GUAIFENESIN 1200 MG: 600 TABLET, EXTENDED RELEASE ORAL at 09:22

## 2021-09-03 RX ADMIN — BUDESONIDE AND FORMOTEROL FUMARATE DIHYDRATE 2 PUFF: 160; 4.5 AEROSOL RESPIRATORY (INHALATION) at 08:40

## 2021-09-03 RX ADMIN — BENZONATATE 200 MG: 100 CAPSULE ORAL at 09:22

## 2021-09-03 RX ADMIN — GUAIFENESIN 1200 MG: 600 TABLET, EXTENDED RELEASE ORAL at 20:48

## 2021-09-03 RX ADMIN — CHOLECALCIFEROL TAB 10 MCG (400 UNIT) 1000 UNITS: 10 TAB at 09:22

## 2021-09-03 RX ADMIN — BENZONATATE 200 MG: 100 CAPSULE ORAL at 15:45

## 2021-09-03 RX ADMIN — OXYCODONE HYDROCHLORIDE AND ACETAMINOPHEN 500 MG: 500 TABLET ORAL at 09:22

## 2021-09-03 RX ADMIN — ACETAMINOPHEN 650 MG: 325 TABLET ORAL at 23:06

## 2021-09-03 RX ADMIN — LEVOTHYROXINE SODIUM 112 MCG: 0.07 TABLET ORAL at 05:01

## 2021-09-03 RX ADMIN — SODIUM CHLORIDE, PRESERVATIVE FREE 10 ML: 5 INJECTION INTRAVENOUS at 09:21

## 2021-09-03 RX ADMIN — METOPROLOL SUCCINATE 50 MG: 50 TABLET, EXTENDED RELEASE ORAL at 09:21

## 2021-09-04 VITALS
BODY MASS INDEX: 31.22 KG/M2 | HEART RATE: 76 BPM | DIASTOLIC BLOOD PRESSURE: 64 MMHG | HEIGHT: 69 IN | SYSTOLIC BLOOD PRESSURE: 124 MMHG | RESPIRATION RATE: 18 BRPM | OXYGEN SATURATION: 92 % | WEIGHT: 210.8 LBS | TEMPERATURE: 97 F

## 2021-09-04 PROBLEM — D89.832 CYTOKINE RELEASE SYNDROME, GRADE 2: Status: ACTIVE | Noted: 2021-09-04

## 2021-09-04 LAB
ALBUMIN SERPL-MCNC: 2.68 G/DL (ref 3.5–5.2)
ALBUMIN/GLOB SERPL: 1 G/DL
ALP SERPL-CCNC: 54 U/L (ref 39–117)
ALT SERPL W P-5'-P-CCNC: 34 U/L (ref 1–41)
ANION GAP SERPL CALCULATED.3IONS-SCNC: 5.8 MMOL/L (ref 5–15)
AST SERPL-CCNC: 12 U/L (ref 1–40)
BILIRUB SERPL-MCNC: 0.4 MG/DL (ref 0–1.2)
BUN SERPL-MCNC: 29 MG/DL (ref 8–23)
BUN/CREAT SERPL: 21.6 (ref 7–25)
CALCIUM SPEC-SCNC: 8.1 MG/DL (ref 8.6–10.5)
CHLORIDE SERPL-SCNC: 101 MMOL/L (ref 98–107)
CO2 SERPL-SCNC: 25.2 MMOL/L (ref 22–29)
CREAT SERPL-MCNC: 1.34 MG/DL (ref 0.76–1.27)
DEPRECATED RDW RBC AUTO: 47.1 FL (ref 37–54)
ERYTHROCYTE [DISTWIDTH] IN BLOOD BY AUTOMATED COUNT: 13.9 % (ref 12.3–15.4)
GFR SERPL CREATININE-BSD FRML MDRD: 51 ML/MIN/1.73
GLOBULIN UR ELPH-MCNC: 2.7 GM/DL
GLUCOSE SERPL-MCNC: 132 MG/DL (ref 65–99)
HCT VFR BLD AUTO: 44.1 % (ref 37.5–51)
HGB BLD-MCNC: 14.5 G/DL (ref 13–17.7)
LYMPHOCYTES # BLD MANUAL: 5.38 10*3/MM3 (ref 0.7–3.1)
LYMPHOCYTES NFR BLD MANUAL: 26 % (ref 19.6–45.3)
LYMPHOCYTES NFR BLD MANUAL: 6 % (ref 5–12)
MCH RBC QN AUTO: 30.1 PG (ref 26.6–33)
MCHC RBC AUTO-ENTMCNC: 32.9 G/DL (ref 31.5–35.7)
MCV RBC AUTO: 91.7 FL (ref 79–97)
METAMYELOCYTES NFR BLD MANUAL: 1 % (ref 0–0)
MONOCYTES # BLD AUTO: 1.24 10*3/MM3 (ref 0.1–0.9)
NEUTROPHILS # BLD AUTO: 13.87 10*3/MM3 (ref 1.7–7)
NEUTROPHILS NFR BLD MANUAL: 66 % (ref 42.7–76)
NEUTS BAND NFR BLD MANUAL: 1 % (ref 0–5)
PLAT MORPH BLD: NORMAL
PLATELET # BLD AUTO: 251 10*3/MM3 (ref 140–450)
PMV BLD AUTO: 10 FL (ref 6–12)
POTASSIUM SERPL-SCNC: 4.9 MMOL/L (ref 3.5–5.2)
PROT SERPL-MCNC: 5.4 G/DL (ref 6–8.5)
RBC # BLD AUTO: 4.81 10*6/MM3 (ref 4.14–5.8)
RBC MORPH BLD: NORMAL
SCAN SLIDE: NORMAL
SMUDGE CELLS BLD QL SMEAR: ABNORMAL
SODIUM SERPL-SCNC: 132 MMOL/L (ref 136–145)
WBC # BLD AUTO: 20.7 10*3/MM3 (ref 3.4–10.8)

## 2021-09-04 PROCEDURE — 85007 BL SMEAR W/DIFF WBC COUNT: CPT | Performed by: INTERNAL MEDICINE

## 2021-09-04 PROCEDURE — 85025 COMPLETE CBC W/AUTO DIFF WBC: CPT | Performed by: INTERNAL MEDICINE

## 2021-09-04 PROCEDURE — 94799 UNLISTED PULMONARY SVC/PX: CPT

## 2021-09-04 PROCEDURE — 80053 COMPREHEN METABOLIC PANEL: CPT | Performed by: INTERNAL MEDICINE

## 2021-09-04 PROCEDURE — 99239 HOSP IP/OBS DSCHRG MGMT >30: CPT | Performed by: INTERNAL MEDICINE

## 2021-09-04 RX ORDER — BUSPIRONE HYDROCHLORIDE 7.5 MG/1
7.5 TABLET ORAL 3 TIMES DAILY
Qty: 90 TABLET | Refills: 0 | Status: ON HOLD | OUTPATIENT
Start: 2021-09-04 | End: 2022-11-24

## 2021-09-04 RX ORDER — GUAIFENESIN 600 MG/1
1200 TABLET, EXTENDED RELEASE ORAL EVERY 12 HOURS SCHEDULED
Qty: 28 TABLET | Refills: 0 | Status: SHIPPED | OUTPATIENT
Start: 2021-09-04 | End: 2021-09-21

## 2021-09-04 RX ORDER — BENZONATATE 200 MG/1
200 CAPSULE ORAL 3 TIMES DAILY
Qty: 21 CAPSULE | Refills: 0 | Status: SHIPPED | OUTPATIENT
Start: 2021-09-04 | End: 2021-09-21

## 2021-09-04 RX ORDER — ALBUTEROL SULFATE 90 UG/1
2 AEROSOL, METERED RESPIRATORY (INHALATION) EVERY 4 HOURS PRN
Qty: 8.5 G | Refills: 0 | Status: SHIPPED | OUTPATIENT
Start: 2021-09-04

## 2021-09-04 RX ADMIN — BUDESONIDE AND FORMOTEROL FUMARATE DIHYDRATE 2 PUFF: 160; 4.5 AEROSOL RESPIRATORY (INHALATION) at 08:06

## 2021-09-04 RX ADMIN — PANTOPRAZOLE SODIUM 40 MG: 40 TABLET, DELAYED RELEASE ORAL at 05:16

## 2021-09-04 RX ADMIN — LEVOTHYROXINE SODIUM 112 MCG: 0.07 TABLET ORAL at 05:17

## 2021-09-05 ENCOUNTER — READMISSION MANAGEMENT (OUTPATIENT)
Dept: CALL CENTER | Facility: HOSPITAL | Age: 81
End: 2021-09-05

## 2021-09-05 NOTE — OUTREACH NOTE
COVID-19 Week 1 Survey      Responses   [facility] patient discharged from?  [facility]   Does the patient have one of the following disease processes/diagnoses(primary or secondary)?  COVID-19   COVID-19 underlying condition?  None   Call Number  Call 1   Week 1 Call successful?  Yes   Call start time  1145   Call end time  1152   Discharge diagnosis  covid positive   Is patient permission given to speak with other caregiver?  Yes   List who call center can speak with  Wife   Person spoke with today (if not patient) and relationship  wife   Meds reviewed with patient/caregiver?  Yes   Is the patient having any side effects they believe may be caused by any medication additions or changes?  No   Does the patient have all medications ordered at discharge?  Yes   Is the patient taking all medications as directed (includes completed medication regime)?  Yes   Does the patient have a primary care provider?   Yes   Does the patient have an appointment with their PCP or specialist within 7 days of discharge?  No   What is preventing the patient from scheduling follow up appointments within 7 days of discharge?  Haven't had time   Nursing Interventions  Advised patient to make appointment   Has the patient kept scheduled appointments due by today?  N/A   Has home health visited the patient within 72 hours of discharge?  N/A   Has all DME been delivered?  No   Psychosocial issues?  No   Did the patient receive a copy of their discharge instructions?  Yes   Did the patient receive a copy of COVID-19 specific instructions?  Yes   Nursing interventions  Reviewed instructions with patient   What is the patient's perception of their health status since discharge?  Same   Does the patient have any of the following symptoms?  Cough, Shortness of breath   Nursing Interventions  Nurse provided patient education   Pulse Ox monitoring  Intermittent   Pulse Ox device source  Hospital   O2 Sat comments  Running 95% of oxygen   O2 Sat:  education provided  Sat levels, Other, When to seek care   O2 Sat education comments  Keep sats above 92% and come to ED if dropping   Is the patient/caregiver able to teach back steps to recovery at home?  Rest and rebuild strength, gradually increase activity, Practice good oral hygiene, Eat a well-balance diet   If the patient is a current smoker, are they able to teach back resources for cessation?  Not a smoker   Is the patient/caregiver able to teach back the hierarchy of who to call/visit for symptoms/problems? PCP, Specialist, Home health nurse, Urgent Care, ED, 911  Yes   COVID-19 call completed?  Yes   Wrap up additional comments  Enc fluids, changing out toothbrush and chapsticks. Enc f/u with PCP. Good pulmonary toilet.           Lucinda Mann RN

## 2021-09-05 NOTE — OUTREACH NOTE
Prep Survey      Responses   [facility]?  [facility]   Is LACE score < 7 ?  No   Emergency Room discharge w/ pulse ox?  No   Eligibility  Readm Mgmt   Discharge diagnosis  covid positive   Does the patient have one of the following disease processes/diagnoses(primary or secondary)?  COVID-19   Does the patient have Home health ordered?  No   Is there a DME ordered?  No   Prep survey completed?  Yes          Sapphire Pino RN

## 2021-09-06 ENCOUNTER — READMISSION MANAGEMENT (OUTPATIENT)
Dept: CALL CENTER | Facility: HOSPITAL | Age: 81
End: 2021-09-06

## 2021-09-06 NOTE — OUTREACH NOTE
"COVID-19 Week 1 Survey      Responses   [facility] patient discharged from?  [facility]   Does the patient have one of the following disease processes/diagnoses(primary or secondary)?  COVID-19   COVID-19 underlying condition?  None   Call Number  Call 2   Week 1 Call successful?  Yes   Call start time  1138   Call end time  1143   Discharge diagnosis  covid positive   Has the patient kept scheduled appointments due by today?  N/A   Psychosocial issues?  No   What is the patient's perception of their health status since discharge?  Same   Does the patient have any of the following symptoms?  Cough, Shortness of breath   Nursing Interventions  Nurse provided patient education   Pulse Ox monitoring  Intermittent   Pulse Ox device source  Hospital   O2 Sat comments  Running 95% on oxygen   O2 Sat: education provided  Sat levels, Other, When to seek care   O2 Sat education comments  Keep sats above 92% and come to ED if dropping   Is the patient/caregiver able to teach back the hierarchy of who to call/visit for symptoms/problems? PCP, Specialist, Home health nurse, Urgent Care, ED, 911  Yes   COVID-19 call completed?  Yes   Wrap up additional comments  Pt feels like is \"smothering\". Enc deep breathing and he has not been sleeping too well. Enc him with wearing oxygen and he should call for PCP appt tomorrow. He may use a small fan to help him with his smothering c/o.           Lucinda Mann RN  "

## 2021-09-07 ENCOUNTER — READMISSION MANAGEMENT (OUTPATIENT)
Dept: CALL CENTER | Facility: HOSPITAL | Age: 81
End: 2021-09-07

## 2021-09-07 ENCOUNTER — TELEPHONE (OUTPATIENT)
Dept: MEDSURG UNIT | Facility: HOSPITAL | Age: 81
End: 2021-09-07

## 2021-09-07 NOTE — OUTREACH NOTE
COVID-19 Week 1 Survey      Responses   [facility] patient discharged from?  [facility]   Does the patient have one of the following disease processes/diagnoses(primary or secondary)?  COVID-19   COVID-19 underlying condition?  None   Call Number  Call 3   Week 1 Call successful?  Yes   Call start time  1257   Call end time  1301   Discharge diagnosis  covid positive   Meds reviewed with patient/caregiver?  Yes   Is the patient having any side effects they believe may be caused by any medication additions or changes?  No   Does the patient have all medications ordered at discharge?  Yes   Is the patient taking all medications as directed (includes completed medication regime)?  Yes   Does the patient have a primary care provider?   Yes   Does the patient have an appointment with their PCP or specialist within 7 days of discharge?  Yes   Has the patient kept scheduled appointments due by today?  N/A   Comments  Has a call out to his PCP and he is hoping to get a call back today   Has home health visited the patient within 72 hours of discharge?  N/A   Psychosocial issues?  No   What is the patient's perception of their health status since discharge?  Improving   Does the patient have any of the following symptoms?  Shortness of breath   Nursing Interventions  Nurse provided patient education   Pulse Ox monitoring  Intermittent   Pulse Ox device source  Hospital   O2 Sat comments  Using 2L with sats at 95%   O2 Sat: education provided  Sat levels, Monitoring frequency, When to seek care   O2 Sat education comments  Keep sats above 92% and come to ED if dropping   Is the patient/caregiver able to teach back steps to recovery at home?  Set small, achievable goals for return to baseline health, Rest and rebuild strength, gradually increase activity, Make a list of questions for provider's appointment, Eat a well-balance diet   If the patient is a current smoker, are they able to teach back resources for cessation?  Not  a smoker   Is the patient/caregiver able to teach back the hierarchy of who to call/visit for symptoms/problems? PCP, Specialist, Home health nurse, Urgent Care, ED, 911  Yes   COVID-19 call completed?  Yes   Wrap up additional comments  States was still congested last night and couldn't sleep.  Feels like it is clearer now.  He has a call out to his PCP regarding the continued congestion and unable to sleep.  He states his appetite has been poor and encouraged boost or ensure for nutrient.            Vanessa Cerrato, ROSASN

## 2021-09-13 ENCOUNTER — READMISSION MANAGEMENT (OUTPATIENT)
Dept: CALL CENTER | Facility: HOSPITAL | Age: 81
End: 2021-09-13

## 2021-09-13 NOTE — OUTREACH NOTE
COVID-19 Week 2 Survey      Responses   [facility] patient discharged from?  [facility]   Does the patient have one of the following disease processes/diagnoses(primary or secondary)?  COVID-19   COVID-19 underlying condition?  None   Call Number  Call 1   COVID-19 Week 2: Call 1 attempt successful?  Yes   Call start time  1647   Call end time  1652   Discharge diagnosis  covid positive   Meds reviewed with patient/caregiver?  Yes   Is the patient having any side effects they believe may be caused by any medication additions or changes?  No   Does the patient have all medications ordered at discharge?  Yes   Is the patient taking all medications as directed (includes completed medication regime)?  Yes   Does the patient have a primary care provider?   Yes   Does the patient have an appointment with their PCP or specialist within 7 days of discharge?  Yes   Has the patient kept scheduled appointments due by today?  Yes [phone visit]   Has home health visited the patient within 72 hours of discharge?  N/A   Psychosocial issues?  No   Did the patient receive a copy of their discharge instructions?  Yes   Did the patient receive a copy of COVID-19 specific instructions?  Yes   Nursing interventions  Reviewed instructions with patient   What is the patient's perception of their health status since discharge?  Improving   Does the patient have any of the following symptoms?  Shortness of breath [SOB on exertion]   Nursing Interventions  Nurse provided patient education   Pulse Ox monitoring  Intermittent   Pulse Ox device source  Patient   O2 Sat comments  95-96% on RA, today did not use O2, will still use 1L O2 at night   O2 Sat: education provided  Sat levels, Monitoring frequency, When to seek care   O2 Sat education comments  will go to ED for O2 sats <90%   Is the patient/caregiver able to teach back steps to recovery at home?  Set small, achievable goals for return to baseline health, Rest and rebuild strength,  gradually increase activity, Eat a well-balance diet   Is the patient/caregiver able to teach back the hierarchy of who to call/visit for symptoms/problems? PCP, Specialist, Home health nurse, Urgent Care, ED, 911  Yes   COVID-19 call completed?  Yes          Kelly Alvarenga RN

## 2021-09-20 ENCOUNTER — TRANSCRIBE ORDERS (OUTPATIENT)
Dept: ADMINISTRATIVE | Facility: HOSPITAL | Age: 81
End: 2021-09-20

## 2021-09-20 ENCOUNTER — LAB (OUTPATIENT)
Dept: LAB | Facility: HOSPITAL | Age: 81
End: 2021-09-20

## 2021-09-20 ENCOUNTER — READMISSION MANAGEMENT (OUTPATIENT)
Dept: CALL CENTER | Facility: HOSPITAL | Age: 81
End: 2021-09-20

## 2021-09-20 ENCOUNTER — HOSPITAL ENCOUNTER (OUTPATIENT)
Dept: GENERAL RADIOLOGY | Facility: HOSPITAL | Age: 81
Discharge: HOME | End: 2021-09-20

## 2021-09-20 DIAGNOSIS — E87.6 HYPOKALEMIA: Primary | ICD-10-CM

## 2021-09-20 DIAGNOSIS — E87.6 HYPOKALEMIA: ICD-10-CM

## 2021-09-20 DIAGNOSIS — R05.9 COUGH: ICD-10-CM

## 2021-09-20 DIAGNOSIS — R05.9 COUGH: Primary | ICD-10-CM

## 2021-09-20 LAB
ALBUMIN SERPL-MCNC: 3.27 G/DL (ref 3.5–5.2)
ALBUMIN/GLOB SERPL: 1.2 G/DL
ALP SERPL-CCNC: 82 U/L (ref 39–117)
ALT SERPL W P-5'-P-CCNC: 25 U/L (ref 1–41)
ANION GAP SERPL CALCULATED.3IONS-SCNC: 10.1 MMOL/L (ref 5–15)
AST SERPL-CCNC: 15 U/L (ref 1–40)
BILIRUB SERPL-MCNC: 0.4 MG/DL (ref 0–1.2)
BUN SERPL-MCNC: 16 MG/DL (ref 8–23)
BUN/CREAT SERPL: 16.3 (ref 7–25)
CALCIUM SPEC-SCNC: 8.7 MG/DL (ref 8.6–10.5)
CHLORIDE SERPL-SCNC: 101 MMOL/L (ref 98–107)
CO2 SERPL-SCNC: 24.9 MMOL/L (ref 22–29)
CREAT SERPL-MCNC: 0.98 MG/DL (ref 0.76–1.27)
GFR SERPL CREATININE-BSD FRML MDRD: 73 ML/MIN/1.73
GLOBULIN UR ELPH-MCNC: 2.8 GM/DL
GLUCOSE SERPL-MCNC: 142 MG/DL (ref 65–99)
POTASSIUM SERPL-SCNC: 3.8 MMOL/L (ref 3.5–5.2)
PROT SERPL-MCNC: 6.1 G/DL (ref 6–8.5)
SODIUM SERPL-SCNC: 136 MMOL/L (ref 136–145)

## 2021-09-20 PROCEDURE — 36415 COLL VENOUS BLD VENIPUNCTURE: CPT

## 2021-09-20 PROCEDURE — 80053 COMPREHEN METABOLIC PANEL: CPT

## 2021-09-20 PROCEDURE — 71046 X-RAY EXAM CHEST 2 VIEWS: CPT | Performed by: RADIOLOGY

## 2021-09-20 PROCEDURE — 71046 X-RAY EXAM CHEST 2 VIEWS: CPT

## 2021-09-20 NOTE — OUTREACH NOTE
COVID-19 Week 3 Survey      Responses   [facility] patient discharged from?  [facility]   Does the patient have one of the following disease processes/diagnoses(primary or secondary)?  COVID-19   COVID-19 underlying condition?  None   Call Number  Call 1   COVID-19 Week 3: Call 1 attempt successful?  Yes   Call start time  1000   Call end time  1003   Discharge diagnosis  covid positive   Meds reviewed with patient/caregiver?  Yes   Comments regarding appointments  Pt to have repeat blood work and chest xray on this date.   COVID-19 call completed?  Yes   Wrap up additional comments  Pt reports back on 02 @ 2l. Pt continues to be sob. Pt will see pcp today with repeat blood work and chest xray as well.          Perla Wayne RN

## 2021-09-21 ENCOUNTER — OFFICE VISIT (OUTPATIENT)
Dept: CARDIOLOGY | Facility: CLINIC | Age: 81
End: 2021-09-21

## 2021-09-21 VITALS
DIASTOLIC BLOOD PRESSURE: 87 MMHG | TEMPERATURE: 98 F | SYSTOLIC BLOOD PRESSURE: 144 MMHG | HEIGHT: 69 IN | BODY MASS INDEX: 31.4 KG/M2 | HEART RATE: 95 BPM | OXYGEN SATURATION: 94 % | WEIGHT: 212 LBS

## 2021-09-21 DIAGNOSIS — I10 ESSENTIAL HYPERTENSION: ICD-10-CM

## 2021-09-21 DIAGNOSIS — I47.29 NONSUSTAINED VENTRICULAR TACHYCARDIA (HCC): ICD-10-CM

## 2021-09-21 DIAGNOSIS — I47.1 NONSUSTAINED SUPRAVENTRICULAR TACHYCARDIA (HCC): ICD-10-CM

## 2021-09-21 DIAGNOSIS — I50.32 CHRONIC HEART FAILURE WITH PRESERVED EJECTION FRACTION (HCC): Primary | ICD-10-CM

## 2021-09-21 PROCEDURE — 99213 OFFICE O/P EST LOW 20 MIN: CPT | Performed by: PHYSICIAN ASSISTANT

## 2021-09-21 RX ORDER — POTASSIUM CHLORIDE 750 MG/1
TABLET, EXTENDED RELEASE ORAL
Status: ON HOLD | COMMUNITY
Start: 2021-09-20 | End: 2022-11-24

## 2021-09-21 RX ORDER — ASPIRIN 81 MG/1
81 TABLET ORAL DAILY
Status: ON HOLD | COMMUNITY
End: 2022-11-24

## 2021-09-21 NOTE — PROGRESS NOTES
Ángela Bonilla, APRN  Edward Rodas  1940 09/21/2021    Patient Active Problem List   Diagnosis   • Essential hypertension   • Palpitations   • Near syncope   • Nonsustained supraventricular tachycardia (CMS/HCC)   • Nonsustained ventricular tachycardia (CMS/HCC)   • Abnormal nuclear stress test   • Pneumonia due to COVID-19 virus   • Acute hypoxemic respiratory failure due to COVID-19 (CMS/HCC)   • Cytokine release syndrome, grade 2   • Chronic heart failure with preserved ejection fraction (CMS/HCC)       Ángela Santana, APRN:    Subjective     History of Present Illness:    Chief Complaint   Patient presents with   • Follow-up     POST COVID -DX AUG 24TH       Edward Rodas is a pleasant 81 y.o. male with a past medical history significant for no known coronary artery disease but does have history of supraventricular and ventricular dysrhythmias that has been controlled with metoprolol.  He is nondiabetic and does not smoke tobacco.  He comes in today for hospital follow-up.    Since Edward was last seen he was hospitalized for COVID-19 he was treated with remdesivir and Decadron initial his oxygen requirements did increase however this eventually did improve and he was discharged in stable condition although he did require supplemental oxygen at discharge.  He reports that he required discharge up until last week where he went 3 days without oxygen, however, his oxygen started to desaturate around 88 to 89% so he has been wearing oxygen since.  He has seen his PCP who ordered blood work and a chest x-ray chest x-ray is not resulted yet but CMP was unremarkable.  He does report since being at home he does use anywhere from 2-3 pillows at night but does deny PND and pedal edema.    Allergies   Allergen Reactions   • Sulfa Antibiotics Myalgia     States lowers blood sugar   • Diprivan [Propofol] Angioedema   • Statins Mental Status Change and Myalgia   • Shellfish-Derived Products Hives and Itching    :      Current Outpatient Medications:   •  acetaminophen (TYLENOL) 500 MG tablet, Take 500 mg by mouth Every 6 (Six) Hours As Needed for Mild Pain  or Fever., Disp: , Rfl:   •  albuterol sulfate  (90 Base) MCG/ACT inhaler, Inhale 2 puffs Every 4 (Four) Hours As Needed for Wheezing., Disp: 8.5 g, Rfl: 0  •  amLODIPine (NORVASC) 10 MG tablet, Take 5 mg by mouth Daily., Disp: , Rfl:   •  ascorbic acid (VITAMIN C) 500 MG tablet, Take 500 mg by mouth Daily., Disp: , Rfl:   •  aspirin 81 MG EC tablet, Take 81 mg by mouth Daily., Disp: , Rfl:   •  budesonide-formoterol (SYMBICORT) 160-4.5 MCG/ACT inhaler, Inhale 2 puffs 2 (Two) Times a Day., Disp: , Rfl:   •  calcium carbonate (OS-TIANA) 600 MG tablet, Take 600 mg by mouth Daily., Disp: , Rfl:   •  cholecalciferol (VITAMIN D3) 25 MCG (1000 UT) tablet, Take 1,000 Units by mouth Daily., Disp: , Rfl:   •  levothyroxine (SYNTHROID, LEVOTHROID) 112 MCG tablet, Take 112 mcg by mouth Daily., Disp: , Rfl:   •  magnesium oxide (MAGOX) 400 (241.3 Mg) MG tablet tablet, Take 400 mg by mouth Daily., Disp: , Rfl:   •  metoprolol succinate XL (TOPROL-XL) 50 MG 24 hr tablet, Take 1 tablet by mouth Daily., Disp: 90 tablet, Rfl: 2  •  potassium chloride (K-DUR,KLOR-CON) 10 MEQ CR tablet, TAKE ONE CAPSULE BY MOUTH EVERY DAY FOR 3 DAYS, Disp: , Rfl:   •  simethicone (MYLICON) 80 MG chewable tablet, Chew 80 mg Every 6 (Six) Hours As Needed for Flatulence., Disp: , Rfl:   •  valsartan-hydrochlorothiazide (DIOVAN-HCT) 160-12.5 MG per tablet, Take 1 tablet by mouth Daily., Disp: 90 tablet, Rfl: 2  •  busPIRone (BUSPAR) 7.5 MG tablet, Take 1 tablet by mouth 3 (Three) Times a Day., Disp: 90 tablet, Rfl: 0  •  Omega-3 Fatty Acids (FISH OIL) 1000 MG capsule capsule, Take 1,000 mg by mouth Daily With Breakfast., Disp: , Rfl:     The following portions of the patient's history were reviewed and updated as appropriate: allergies, current medications, past family history, past medical history,  "past social history, past surgical history and problem list.    Social History     Tobacco Use   • Smoking status: Former Smoker     Packs/day: 1.00     Types: Cigarettes     Quit date: 1958     Years since quittin.7   • Smokeless tobacco: Never Used   Substance Use Topics   • Alcohol use: No   • Drug use: No         Objective   Vitals:    21 1025   BP: 144/87   Pulse: 95   Temp: 98 °F (36.7 °C)   SpO2: 94%   Weight: 96.2 kg (212 lb)   Height: 175.3 cm (69\")     Body mass index is 31.31 kg/m².    Constitutional:       General: Not in acute distress.     Appearance: Healthy appearance. Well-developed and not in distress. Not diaphoretic.   Eyes:      Conjunctiva/sclera: Conjunctivae normal.      Pupils: Pupils are equal, round, and reactive to light.   HENT:      Head: Normocephalic and atraumatic.   Neck:      Vascular: No carotid bruit or JVD.   Pulmonary:      Effort: Pulmonary effort is normal. No respiratory distress.      Breath sounds: Normal breath sounds.   Cardiovascular:      Normal rate. Regular rhythm.   Skin:     General: Skin is cool.   Neurological:      Mental Status: Alert, oriented to person, place, and time and oriented to person, place and time.         Lab Results   Component Value Date     2021    K 3.8 2021     2021    CO2 24.9 2021    BUN 16 2021    CREATININE 0.98 2021    GLUCOSE 142 (H) 2021    CALCIUM 8.7 2021    AST 15 2021    ALT 25 2021    ALKPHOS 82 2021     No results found for: CKTOTAL  Lab Results   Component Value Date    WBC 20.70 (H) 2021    HGB 14.5 2021    HCT 44.1 2021     2021     Lab Results   Component Value Date    INR 1.03 2020    INR 0.90 2020    INR 0.94 2019     Lab Results   Component Value Date    MG 2.5 (H) 2021     Lab Results   Component Value Date    TSH 0.843 2021    TRIG 112 2018    HDL 34 (L) 2018    "  (H) 08/08/2018      No results found for: BNP    During this visit the following were done:  Labs Reviewed []    Labs Ordered []    Radiology Reports Reviewed []    Radiology Ordered []    PCP Records Reviewed []    Referring Provider Records Reviewed []    ER Records Reviewed []    Hospital Records Reviewed []    History Obtained From Family []    Radiology Images Reviewed []    Other Reviewed []    Records Requested []       Procedures    Assessment/Plan    Diagnosis Plan   1. Chronic heart failure with preserved ejection fraction (CMS/HCC)     2. Essential hypertension     3. Nonsustained supraventricular tachycardia (CMS/HCC)     4. Nonsustained ventricular tachycardia (CMS/HCC)                Recommendations:  1. Dyspnea on exertion  1. Patient reports that he did have  CXR done which has not resulted yet. I suspect his dyspnea is related to recent COVID-19 infection. However, if any congestion is seen on CXR will start loop diuretic.   2. Encouraged him to continue following with PCP which he has been doing.  2. Supraventricular tachycardia/nonsustained V. Tach  1. He does deny any palpitations today we will continue metoprolol.      No follow-ups on file.    As always, I appreciate very much the opportunity to participate in the cardiovascular care of your patients.      With Best Regards,    Suleiman Man PA-C

## 2021-09-27 ENCOUNTER — READMISSION MANAGEMENT (OUTPATIENT)
Dept: CALL CENTER | Facility: HOSPITAL | Age: 81
End: 2021-09-27

## 2021-09-27 NOTE — OUTREACH NOTE
COVID-19 Week 4 Survey      Responses   [facility] patient discharged from?  [facility]   Does the patient have one of the following disease processes/diagnoses(primary or secondary)?  COVID-19   COVID-19 underlying condition?  None   Call Number  Call 1   COVID-19 Week 4: Call 1 attempt successful?  No   Discharge diagnosis  covid positive          Lucinda Mann RN

## 2021-10-14 ENCOUNTER — TRANSCRIBE ORDERS (OUTPATIENT)
Dept: ADMINISTRATIVE | Facility: HOSPITAL | Age: 81
End: 2021-10-14

## 2021-10-14 DIAGNOSIS — R10.84 ABDOMINAL PAIN, GENERALIZED: Primary | ICD-10-CM

## 2021-10-14 DIAGNOSIS — M25.552 LEFT HIP PAIN: ICD-10-CM

## 2021-10-18 ENCOUNTER — TRANSCRIBE ORDERS (OUTPATIENT)
Dept: OTHER | Facility: OTHER | Age: 81
End: 2021-10-18

## 2021-10-18 ENCOUNTER — HOSPITAL ENCOUNTER (OUTPATIENT)
Dept: CT IMAGING | Facility: HOSPITAL | Age: 81
Discharge: HOME | End: 2021-10-18
Admitting: NURSE PRACTITIONER

## 2021-10-18 DIAGNOSIS — M25.552 LEFT HIP PAIN: ICD-10-CM

## 2021-10-18 PROCEDURE — 73700 CT LOWER EXTREMITY W/O DYE: CPT

## 2021-10-18 PROCEDURE — 73700 CT LOWER EXTREMITY W/O DYE: CPT | Performed by: RADIOLOGY

## 2021-12-23 ENCOUNTER — OFFICE VISIT (OUTPATIENT)
Dept: CARDIOLOGY | Facility: CLINIC | Age: 81
End: 2021-12-23

## 2021-12-23 VITALS
DIASTOLIC BLOOD PRESSURE: 78 MMHG | TEMPERATURE: 97.5 F | OXYGEN SATURATION: 96 % | HEART RATE: 66 BPM | SYSTOLIC BLOOD PRESSURE: 155 MMHG | WEIGHT: 216.2 LBS | HEIGHT: 69 IN | BODY MASS INDEX: 32.02 KG/M2

## 2021-12-23 DIAGNOSIS — R27.0 ATAXIA: ICD-10-CM

## 2021-12-23 DIAGNOSIS — R94.39 ABNORMAL NUCLEAR STRESS TEST: ICD-10-CM

## 2021-12-23 DIAGNOSIS — I47.1 NONSUSTAINED SUPRAVENTRICULAR TACHYCARDIA (HCC): Primary | ICD-10-CM

## 2021-12-23 DIAGNOSIS — I47.29 NONSUSTAINED VENTRICULAR TACHYCARDIA (HCC): ICD-10-CM

## 2021-12-23 PROCEDURE — 93000 ELECTROCARDIOGRAM COMPLETE: CPT | Performed by: INTERNAL MEDICINE

## 2021-12-23 PROCEDURE — 99213 OFFICE O/P EST LOW 20 MIN: CPT | Performed by: INTERNAL MEDICINE

## 2021-12-23 RX ORDER — METOPROLOL SUCCINATE 50 MG/1
50 TABLET, EXTENDED RELEASE ORAL DAILY
Qty: 90 TABLET | Refills: 2 | Status: SHIPPED | OUTPATIENT
Start: 2021-12-23 | End: 2022-04-21 | Stop reason: SDUPTHER

## 2021-12-23 NOTE — PROGRESS NOTES
Ángela Bonilla, APRN  Edward Rodas  2023    Patient Active Problem List   Diagnosis   • Essential hypertension   • Palpitations   • Near syncope   • Nonsustained supraventricular tachycardia (HCC)   • Nonsustained ventricular tachycardia (HCC)   • Abnormal nuclear stress test   • Pneumonia due to COVID-19 virus   • Acute hypoxemic respiratory failure due to COVID-19 (HCC)   • Cytokine release syndrome, grade 2   • Chronic heart failure with preserved ejection fraction (HCC)       Ángela Santana, APRN:    Subjective     Edward Rodas is a 81 y.o. male with the problems as listed above, presents    Chief complaint: Follow-up of history of nonsustained supraventricular r tachycardia and ventricular tachycardia.    History of Present Illness: Mr. Cantor is a pleasant 80-year-old  male with previous history of nonsustained  ventriclar tachycardia and previous history of mildly abnormal Lexiscan sestamibi study with mild degree of small size inferior wall myocardial ischemia. He is here for regular cardiology follow-up.  On today's visit he denies any significant palpitations.  He does get staggery and when he is walking at times but no syncope.  He denies any significant chest pains.  He has chronic dyspnea with moderate exertion.    Edward Rodas  Echo Complete w/Doppler and Color Flow  Order# [id number]  Reading physician:   Chloe Renae MD Ordering physician:   Joe Sanhcez DO Study date: 21       Patient Name   Edward Rodas MRN   [id number] Legal Sex   Male  (Age)   1940 (81 y.o.)     Interpretation Summary    · Left ventricular ejection fraction appears to be 56 - 60%. Left ventricular systolic function is normal.  · Left ventricular diastolic function is consistent with (grade I) impaired relaxation.  · This was a technically limited study.             Allergies   Allergen Reactions   • Sulfa Antibiotics Myalgia     States lowers blood sugar   • Diprivan [Propofol]  Angioedema   • Statins Mental Status Change and Myalgia   • Shellfish-Derived Products Hives and Itching   :      Current Outpatient Medications:   •  acetaminophen (TYLENOL) 500 MG tablet, Take 500 mg by mouth Every 6 (Six) Hours As Needed for Mild Pain  or Fever., Disp: , Rfl:   •  albuterol sulfate  (90 Base) MCG/ACT inhaler, Inhale 2 puffs Every 4 (Four) Hours As Needed for Wheezing., Disp: 8.5 g, Rfl: 0  •  amLODIPine (NORVASC) 10 MG tablet, Take 5 mg by mouth Daily., Disp: , Rfl:   •  budesonide-formoterol (SYMBICORT) 160-4.5 MCG/ACT inhaler, Inhale 2 puffs 2 (Two) Times a Day., Disp: , Rfl:   •  calcium carbonate (OS-TIANA) 600 MG tablet, Take 600 mg by mouth Daily., Disp: , Rfl:   •  cholecalciferol (VITAMIN D3) 25 MCG (1000 UT) tablet, Take 1,000 Units by mouth Daily., Disp: , Rfl:   •  levothyroxine (SYNTHROID, LEVOTHROID) 112 MCG tablet, Take 112 mcg by mouth Daily., Disp: , Rfl:   •  magnesium oxide (MAGOX) 400 (241.3 Mg) MG tablet tablet, Take 400 mg by mouth Daily., Disp: , Rfl:   •  metoprolol succinate XL (TOPROL-XL) 50 MG 24 hr tablet, Take 1 tablet by mouth Daily., Disp: 90 tablet, Rfl: 2  •  valsartan-hydrochlorothiazide (DIOVAN-HCT) 160-12.5 MG per tablet, Take 1 tablet by mouth Daily., Disp: 90 tablet, Rfl: 2  •  ascorbic acid (VITAMIN C) 500 MG tablet, Take 500 mg by mouth Daily., Disp: , Rfl:   •  aspirin 81 MG EC tablet, Take 81 mg by mouth Daily., Disp: , Rfl:   •  busPIRone (BUSPAR) 7.5 MG tablet, Take 1 tablet by mouth 3 (Three) Times a Day., Disp: 90 tablet, Rfl: 0  •  Omega-3 Fatty Acids (FISH OIL) 1000 MG capsule capsule, Take 1,000 mg by mouth Daily With Breakfast., Disp: , Rfl:   •  potassium chloride (K-DUR,KLOR-CON) 10 MEQ CR tablet, TAKE ONE CAPSULE BY MOUTH EVERY DAY FOR 3 DAYS, Disp: , Rfl:   •  simethicone (MYLICON) 80 MG chewable tablet, Chew 80 mg Every 6 (Six) Hours As Needed for Flatulence., Disp: , Rfl:       The following portions of the patient's history were  "reviewed and updated as appropriate: allergies, current medications, past family history, past medical history, past social history, past surgical history and problem list.    Social History     Tobacco Use   • Smoking status: Former Smoker     Packs/day: 1.00     Types: Cigarettes     Quit date:      Years since quittin.0   • Smokeless tobacco: Never Used   Substance Use Topics   • Alcohol use: No   • Drug use: No       Review of Systems   Constitutional: Negative for chills and fever.   HENT: Negative for nosebleeds and sore throat.    Cardiovascular: Positive for dyspnea on exertion.   Respiratory: Negative for cough, hemoptysis and wheezing.    Gastrointestinal: Negative for abdominal pain, hematemesis, hematochezia, melena, nausea and vomiting.   Genitourinary: Negative for dysuria and hematuria.   Neurological: Positive for disturbances in coordination. Negative for headaches.       Objective   Vitals:    21 1036   BP: 155/78   Pulse: 66   Temp: 97.5 °F (36.4 °C)   SpO2: 96%   Weight: 98.1 kg (216 lb 3.2 oz)   Height: 175.3 cm (69.02\")     Body mass index is 31.91 kg/m².    Constitutional:       Appearance: Well-developed.   Eyes:      Conjunctiva/sclera: Conjunctivae normal.   HENT:      Head: Normocephalic.   Neck:      Thyroid: No thyromegaly.      Vascular: No JVD.      Trachea: No tracheal deviation.   Pulmonary:      Effort: No respiratory distress.      Breath sounds: Normal breath sounds. No wheezing. No rales.   Cardiovascular:      PMI at left midclavicular line. Normal rate. Regular rhythm. Normal S1. Normal S2.      Murmurs: There is no murmur.      No gallop. No click. No rub.   Pulses:     Intact distal pulses.   Edema:     Peripheral edema absent.   Abdominal:      General: Bowel sounds are normal.      Palpations: Abdomen is soft. There is no abdominal mass.      Tenderness: There is no abdominal tenderness.   Musculoskeletal:      Cervical back: Normal range of motion and neck " supple. Skin:     General: Skin is warm and dry.   Neurological:      Mental Status: Alert and oriented to person, place, and time.      Cranial Nerves: No cranial nerve deficit.         Lab Results   Component Value Date     09/20/2021    K 3.8 09/20/2021     09/20/2021    CO2 24.9 09/20/2021    BUN 16 09/20/2021    CREATININE 0.98 09/20/2021    GLUCOSE 142 (H) 09/20/2021    CALCIUM 8.7 09/20/2021    AST 15 09/20/2021    ALT 25 09/20/2021    ALKPHOS 82 09/20/2021     No results found for: CKTOTAL  Lab Results   Component Value Date    WBC 20.70 (H) 09/04/2021    HGB 14.5 09/04/2021    HCT 44.1 09/04/2021     09/04/2021     Lab Results   Component Value Date    INR 1.03 08/03/2020    INR 0.90 02/23/2020    INR 0.94 07/20/2019     Lab Results   Component Value Date    MG 2.5 (H) 09/03/2021     Lab Results   Component Value Date    TSH 0.843 08/24/2021    TRIG 112 08/08/2018    HDL 34 (L) 08/08/2018     (H) 08/08/2018        ECG 12 Lead    Date/Time: 12/23/2021 10:41 AM  Performed by: Jae Payan MD  Authorized by: Jae Payan MD   Comparison: compared with previous ECG from 8/25/2021  Similar to previous ECG  Rhythm: sinus rhythm  Conduction: conduction normal  ST Segments: ST segments normal  T Waves: T waves normal    Clinical impression: normal ECG                Assessment/Plan :   Diagnosis Plan   1. Nonsustained supraventricular tachycardia, clinically asymptomatic and stable.     2. Nonsustained ventricular tachycardia, clinically asymptomatic and stable.     3. Abnormal nuclear stress test with mild degree of small size inferior wall myocardial ischemia, clinically asymptomatic and stable..     4. Ataxia          Recommendations:  1. Patient seems to be doing relatively well from cardiac standpoint at this time.  2. For his ataxia, I have offered to refer him to a neurologist but he wants to wait on this for now.    Return in about 3 months (around 3/23/2022).    As always,  Ángela Bonilla, LORENZO  I appreciate very much the opportunity to participate in the cardiovascular care of your patients. Please do not hesitate to call me with any questions with regards to Edward Rodas's evaluation and management.       With Best Regards,        Jae Payan MD, [facility]    Please note that portions of this note were completed with a voice recognition program.

## 2022-04-21 ENCOUNTER — OFFICE VISIT (OUTPATIENT)
Dept: CARDIOLOGY | Facility: CLINIC | Age: 82
End: 2022-04-21

## 2022-04-21 VITALS
OXYGEN SATURATION: 95 % | BODY MASS INDEX: 32.76 KG/M2 | TEMPERATURE: 97.5 F | HEIGHT: 69 IN | DIASTOLIC BLOOD PRESSURE: 86 MMHG | HEART RATE: 67 BPM | SYSTOLIC BLOOD PRESSURE: 151 MMHG | WEIGHT: 221.2 LBS

## 2022-04-21 DIAGNOSIS — I10 ESSENTIAL HYPERTENSION: ICD-10-CM

## 2022-04-21 DIAGNOSIS — I47.29 NONSUSTAINED VENTRICULAR TACHYCARDIA: ICD-10-CM

## 2022-04-21 DIAGNOSIS — I47.1 NONSUSTAINED SUPRAVENTRICULAR TACHYCARDIA: Primary | ICD-10-CM

## 2022-04-21 DIAGNOSIS — R94.39 ABNORMAL NUCLEAR STRESS TEST: ICD-10-CM

## 2022-04-21 PROCEDURE — 99214 OFFICE O/P EST MOD 30 MIN: CPT | Performed by: INTERNAL MEDICINE

## 2022-04-21 RX ORDER — METOPROLOL SUCCINATE 50 MG/1
50 TABLET, EXTENDED RELEASE ORAL DAILY
Qty: 90 TABLET | Refills: 2 | Status: SHIPPED | OUTPATIENT
Start: 2022-04-21

## 2022-04-21 NOTE — PROGRESS NOTES
Ángela Bonilla, APRN  Edward Rodas  1940 04/21/2022    Patient Active Problem List   Diagnosis   • Essential hypertension   • Palpitations   • Near syncope   • Nonsustained supraventricular tachycardia (HCC)   • Nonsustained ventricular tachycardia (HCC)   • Abnormal nuclear stress test   • Pneumonia due to COVID-19 virus   • Acute hypoxemic respiratory failure due to COVID-19 (HCC)   • Cytokine release syndrome, grade 2   • Chronic heart failure with preserved ejection fraction (HCC)       Ángela Santana, APRN:    Subjective     History of Present Illness:    Chief Complaint   Patient presents with   • Follow-up     Routine. PT has no complaints today.         Edward Rodas is a pleasant 82 y.o. male with a past medical history significant for    Allergies   Allergen Reactions   • Sulfa Antibiotics Myalgia     States lowers blood sugar   • Diprivan [Propofol] Angioedema   • Statins Mental Status Change and Myalgia   • Shellfish-Derived Products Hives and Itching   :      Current Outpatient Medications:   •  acetaminophen (TYLENOL) 500 MG tablet, Take 500 mg by mouth Every 6 (Six) Hours As Needed for Mild Pain  or Fever., Disp: , Rfl:   •  albuterol sulfate  (90 Base) MCG/ACT inhaler, Inhale 2 puffs Every 4 (Four) Hours As Needed for Wheezing., Disp: 8.5 g, Rfl: 0  •  amLODIPine (NORVASC) 10 MG tablet, Take 5 mg by mouth Daily., Disp: , Rfl:   •  ascorbic acid (VITAMIN C) 500 MG tablet, Take 500 mg by mouth Daily., Disp: , Rfl:   •  aspirin 81 MG EC tablet, Take 81 mg by mouth Daily., Disp: , Rfl:   •  budesonide-formoterol (SYMBICORT) 160-4.5 MCG/ACT inhaler, Inhale 2 puffs 2 (Two) Times a Day., Disp: , Rfl:   •  busPIRone (BUSPAR) 7.5 MG tablet, Take 1 tablet by mouth 3 (Three) Times a Day., Disp: 90 tablet, Rfl: 0  •  calcium carbonate (OS-TIANA) 600 MG tablet, Take 600 mg by mouth Daily., Disp: , Rfl:   •  cholecalciferol (VITAMIN D3) 25 MCG (1000 UT) tablet, Take 1,000 Units by mouth  "Daily., Disp: , Rfl:   •  levothyroxine (SYNTHROID, LEVOTHROID) 112 MCG tablet, Take 112 mcg by mouth Daily., Disp: , Rfl:   •  magnesium oxide (MAGOX) 400 (241.3 Mg) MG tablet tablet, Take 400 mg by mouth Daily., Disp: , Rfl:   •  metoprolol succinate XL (TOPROL-XL) 50 MG 24 hr tablet, Take 1 tablet by mouth Daily., Disp: 90 tablet, Rfl: 2  •  potassium chloride (K-DUR,KLOR-CON) 10 MEQ CR tablet, TAKE ONE CAPSULE BY MOUTH EVERY DAY FOR 3 DAYS, Disp: , Rfl:   •  simethicone (MYLICON) 80 MG chewable tablet, Chew 80 mg Every 6 (Six) Hours As Needed for Flatulence., Disp: , Rfl:   •  valsartan-hydrochlorothiazide (DIOVAN-HCT) 160-12.5 MG per tablet, Take 1 tablet by mouth Daily., Disp: 90 tablet, Rfl: 2  •  Omega-3 Fatty Acids (FISH OIL) 1000 MG capsule capsule, Take 1,000 mg by mouth Daily With Breakfast., Disp: , Rfl:     The following portions of the patient's history were reviewed and updated as appropriate: allergies, current medications, past family history, past medical history, past social history, past surgical history and problem list.    Social History     Tobacco Use   • Smoking status: Former Smoker     Packs/day: 1.00     Types: Cigarettes     Quit date:      Years since quittin.3   • Smokeless tobacco: Never Used   Substance Use Topics   • Alcohol use: No   • Drug use: No         Objective   Vitals:    22 1221   BP: 151/86   Pulse: 67   Temp: 97.5 °F (36.4 °C)   SpO2: 95%   Weight: 100 kg (221 lb 3.2 oz)   Height: 175.3 cm (69.02\")     Body mass index is 32.65 kg/m².    Physical Exam    Lab Results   Component Value Date     2021    K 3.8 2021     2021    CO2 24.9 2021    BUN 16 2021    CREATININE 0.98 2021    GLUCOSE 142 (H) 2021    CALCIUM 8.7 2021    AST 15 2021    ALT 25 2021    ALKPHOS 82 2021     No results found for: CKTOTAL  Lab Results   Component Value Date    WBC 20.70 (H) 2021    HGB 14.5 " 09/04/2021    HCT 44.1 09/04/2021     09/04/2021     Lab Results   Component Value Date    INR 1.03 08/03/2020    INR 0.90 02/23/2020    INR 0.94 07/20/2019     Lab Results   Component Value Date    MG 2.5 (H) 09/03/2021     Lab Results   Component Value Date    TSH 0.843 08/24/2021    TRIG 112 08/08/2018    HDL 34 (L) 08/08/2018     (H) 08/08/2018      No results found for: BNP    During this visit the following were done:  Labs Reviewed []    Labs Ordered []    Radiology Reports Reviewed []    Radiology Ordered []    PCP Records Reviewed []    Referring Provider Records Reviewed []    ER Records Reviewed []    Hospital Records Reviewed []    History Obtained From Family []    Radiology Images Reviewed []    Other Reviewed []    Records Requested []       Procedures    Assessment/Plan   No diagnosis found.         Recommendations:  1.       No follow-ups on file.    As always, I appreciate very much the opportunity to participate in the cardiovascular care of your patients.      With Best Regards,    Suleiman Man PA-C

## 2022-04-21 NOTE — PROGRESS NOTES
Ángela Bonilla, APRN  Edward Rodas  1940 04/21/2022    Patient Active Problem List   Diagnosis   • Essential hypertension   • Palpitations   • Near syncope   • Nonsustained supraventricular tachycardia (HCC)   • Nonsustained ventricular tachycardia (HCC)   • Abnormal nuclear stress test   • Pneumonia due to COVID-19 virus   • Acute hypoxemic respiratory failure due to COVID-19 (HCC)   • Cytokine release syndrome, grade 2   • Chronic heart failure with preserved ejection fraction (HCC)       Ángela Santana, APRN:    Subjective     Edward Rodas is a 82 y.o. male with the problems as listed above, presents    Chief complaint: Follow-up of nonsustained supraventricular tachycardia and ventricular tachycardia and abnormal nuclear stress test.    History of Present Illness: Mr. Rodas is a pleasant 82-year-old  male with history of nonsustained supraventricular tachycardia and nonsustained ventricular tachycardia that was noted on the previous monitoring.  He has been maintained on metoprolol succinate.  He is here for regular cardiology follow-up.  On today's visit he denies any complaints of palpitations, dizziness or syncope.  He denies any chest pains or shortness of breath.  He has chronic intermittent vertigo/dizziness/ataxia.   He states his blood pressure at home runs in the 120s to 130s over 80s.      Allergies   Allergen Reactions   • Sulfa Antibiotics Myalgia     States lowers blood sugar   • Diprivan [Propofol] Angioedema   • Statins Mental Status Change and Myalgia   • Shellfish-Derived Products Hives and Itching       Current Outpatient Medications:   •  acetaminophen (TYLENOL) 500 MG tablet, Take 500 mg by mouth Every 6 (Six) Hours As Needed for Mild Pain  or Fever., Disp: , Rfl:   •  albuterol sulfate  (90 Base) MCG/ACT inhaler, Inhale 2 puffs Every 4 (Four) Hours As Needed for Wheezing., Disp: 8.5 g, Rfl: 0  •  amLODIPine (NORVASC) 10 MG tablet, Take 5 mg by mouth Daily.,  Disp: , Rfl:   •  ascorbic acid (VITAMIN C) 500 MG tablet, Take 500 mg by mouth Daily., Disp: , Rfl:   •  aspirin 81 MG EC tablet, Take 81 mg by mouth Daily., Disp: , Rfl:   •  budesonide-formoterol (SYMBICORT) 160-4.5 MCG/ACT inhaler, Inhale 2 puffs 2 (Two) Times a Day., Disp: , Rfl:   •  busPIRone (BUSPAR) 7.5 MG tablet, Take 1 tablet by mouth 3 (Three) Times a Day., Disp: 90 tablet, Rfl: 0  •  calcium carbonate (OS-TIANA) 600 MG tablet, Take 600 mg by mouth Daily., Disp: , Rfl:   •  cholecalciferol (VITAMIN D3) 25 MCG (1000 UT) tablet, Take 1,000 Units by mouth Daily., Disp: , Rfl:   •  levothyroxine (SYNTHROID, LEVOTHROID) 112 MCG tablet, Take 112 mcg by mouth Daily., Disp: , Rfl:   •  magnesium oxide (MAGOX) 400 (241.3 Mg) MG tablet tablet, Take 400 mg by mouth Daily., Disp: , Rfl:   •  metoprolol succinate XL (TOPROL-XL) 50 MG 24 hr tablet, Take 1 tablet by mouth Daily., Disp: 90 tablet, Rfl: 2  •  potassium chloride (K-DUR,KLOR-CON) 10 MEQ CR tablet, TAKE ONE CAPSULE BY MOUTH EVERY DAY FOR 3 DAYS, Disp: , Rfl:   •  simethicone (MYLICON) 80 MG chewable tablet, Chew 80 mg Every 6 (Six) Hours As Needed for Flatulence., Disp: , Rfl:   •  valsartan-hydrochlorothiazide (DIOVAN-HCT) 160-12.5 MG per tablet, Take 1 tablet by mouth Daily., Disp: 90 tablet, Rfl: 2  •  Omega-3 Fatty Acids (FISH OIL) 1000 MG capsule capsule, Take 1,000 mg by mouth Daily With Breakfast., Disp: , Rfl:       The following portions of the patient's history were reviewed and updated as appropriate: allergies, current medications, past family history, past medical history, past social history, past surgical history and problem list.    Social History     Tobacco Use   • Smoking status: Former Smoker     Packs/day: 1.00     Types: Cigarettes     Quit date:      Years since quittin.3   • Smokeless tobacco: Never Used   Substance Use Topics   • Alcohol use: No   • Drug use: No     Review of Systems   Constitutional: Negative for chills and  "fever.   HENT: Negative for nosebleeds and sore throat.    Respiratory: Negative for cough, hemoptysis and wheezing.    Gastrointestinal: Negative for abdominal pain, hematemesis, hematochezia, melena, nausea and vomiting.   Genitourinary: Negative for dysuria and hematuria.   Neurological: Negative for headaches.     Objective   Vitals:    04/21/22 1221   BP: 151/86   Pulse: 67   Temp: 97.5 °F (36.4 °C)   SpO2: 95%   Weight: 100 kg (221 lb 3.2 oz)   Height: 175.3 cm (69.02\")     Body mass index is 32.65 kg/m².    Vitals reviewed.   Constitutional:       Appearance: Well-developed.   Eyes:      Conjunctiva/sclera: Conjunctivae normal.   HENT:      Head: Normocephalic.   Neck:      Thyroid: No thyromegaly.      Vascular: No JVD.      Trachea: No tracheal deviation.   Pulmonary:      Effort: No respiratory distress.      Breath sounds: Normal breath sounds. No wheezing. No rales.   Cardiovascular:      PMI at left midclavicular line. Normal rate. Regular rhythm. Normal S1. Normal S2.      Murmurs: There is no murmur.      No gallop. No click. No rub.   Pulses:     Intact distal pulses.   Edema:     Peripheral edema absent.   Abdominal:      General: Bowel sounds are normal.      Palpations: Abdomen is soft. There is no abdominal mass.      Tenderness: There is no abdominal tenderness.   Musculoskeletal:      Cervical back: Normal range of motion and neck supple. Skin:     General: Skin is warm and dry.   Neurological:      Mental Status: Alert and oriented to person, place, and time.      Cranial Nerves: No cranial nerve deficit.       Lab Results   Component Value Date     09/20/2021    K 3.8 09/20/2021     09/20/2021    CO2 24.9 09/20/2021    BUN 16 09/20/2021    CREATININE 0.98 09/20/2021    GLUCOSE 142 (H) 09/20/2021    CALCIUM 8.7 09/20/2021    AST 15 09/20/2021    ALT 25 09/20/2021    ALKPHOS 82 09/20/2021     No results found for: CKTOTAL    Lab Results   Component Value Date    INR 1.03 08/03/2020 "    INR 0.90 02/23/2020    INR 0.94 07/20/2019     Lab Results   Component Value Date    MG 2.5 (H) 09/03/2021     Assessment/Plan :   Diagnosis Plan   1. Nonsustained supraventricular tachycardia, clinically asymptomatic and stable.     2. Nonsustained ventricular tachycardia, clinically asymptomatic and stable.     3. Abnormal nuclear stress test with mild degree of small size inferior wall myocardial ischemia, clinically asymptomatic and stable..     4. Essential hypertension          Recommendations:  1. Continue with low-dose aspirin, metoprolol succinate.  2. Continue the metoprolol succinate for his arrhythmias.  3. Patient has been intolerant to statins with severe muscle aches and pains.  4. I have asked him to keep a check on his blood pressure at home twice a day and call if is running more than 140 on the top and more than 90 on the bottom.    Return in about 5 months (around 9/21/2022).    As always, Ángela Bonilla, LORENZO  I appreciate very much the opportunity to participate in the cardiovascular care of your patients. Please do not hesitate to call me with any questions with regards to Edward Rodas evaluation and management.       With Best Regards,        Jae Payan MD, FACC    Please note that portions of this note were completed with a voice recognition program.

## 2022-05-18 RX ORDER — AMLODIPINE BESYLATE 5 MG/1
TABLET ORAL
Qty: 90 TABLET | Refills: 0 | Status: ON HOLD | OUTPATIENT
Start: 2022-05-18 | End: 2022-11-24

## 2022-11-24 ENCOUNTER — APPOINTMENT (OUTPATIENT)
Dept: GENERAL RADIOLOGY | Facility: HOSPITAL | Age: 82
End: 2022-11-24

## 2022-11-24 ENCOUNTER — HOSPITAL ENCOUNTER (INPATIENT)
Facility: HOSPITAL | Age: 82
LOS: 2 days | Discharge: HOME | End: 2022-11-26
Attending: EMERGENCY MEDICINE | Admitting: HOSPITALIST

## 2022-11-24 ENCOUNTER — APPOINTMENT (OUTPATIENT)
Dept: CT IMAGING | Facility: HOSPITAL | Age: 82
End: 2022-11-24

## 2022-11-24 DIAGNOSIS — J18.9 PNEUMONIA OF BOTH LUNGS DUE TO INFECTIOUS ORGANISM, UNSPECIFIED PART OF LUNG: Primary | ICD-10-CM

## 2022-11-24 DIAGNOSIS — R09.02 HYPOXEMIA: ICD-10-CM

## 2022-11-24 LAB
A-A DO2: 44.9 MMHG (ref 0–300)
ALBUMIN SERPL-MCNC: 4.01 G/DL (ref 3.5–5.2)
ALBUMIN/GLOB SERPL: 1.3 G/DL
ALP SERPL-CCNC: 88 U/L (ref 39–117)
ALT SERPL W P-5'-P-CCNC: 16 U/L (ref 1–41)
ANION GAP SERPL CALCULATED.3IONS-SCNC: 12.6 MMOL/L (ref 5–15)
ARTERIAL PATENCY WRIST A: POSITIVE
AST SERPL-CCNC: 13 U/L (ref 1–40)
ATMOSPHERIC PRESS: 730 MMHG
B PARAPERT DNA SPEC QL NAA+PROBE: NOT DETECTED
B PERT DNA SPEC QL NAA+PROBE: NOT DETECTED
BACTERIA UR QL AUTO: NORMAL /HPF
BASE EXCESS BLDA CALC-SCNC: 2.6 MMOL/L (ref 0–2)
BASOPHILS # BLD AUTO: 0.06 10*3/MM3 (ref 0–0.2)
BASOPHILS NFR BLD AUTO: 0.2 % (ref 0–1.5)
BDY SITE: ABNORMAL
BILIRUB SERPL-MCNC: 0.4 MG/DL (ref 0–1.2)
BILIRUB UR QL STRIP: NEGATIVE
BODY TEMPERATURE: 0 C
BUN SERPL-MCNC: 20 MG/DL (ref 8–23)
BUN/CREAT SERPL: 17.1 (ref 7–25)
C PNEUM DNA NPH QL NAA+NON-PROBE: NOT DETECTED
CALCIUM SPEC-SCNC: 8.9 MG/DL (ref 8.6–10.5)
CHLORIDE SERPL-SCNC: 102 MMOL/L (ref 98–107)
CLARITY UR: CLEAR
CO2 BLDA-SCNC: 27.7 MMOL/L (ref 22–33)
CO2 SERPL-SCNC: 24.4 MMOL/L (ref 22–29)
COHGB MFR BLD: 0.9 % (ref 0–5)
COLOR UR: YELLOW
CREAT SERPL-MCNC: 1.17 MG/DL (ref 0.76–1.27)
CRP SERPL-MCNC: <0.3 MG/DL (ref 0–0.5)
D-LACTATE SERPL-SCNC: 2 MMOL/L (ref 0.5–2)
DEPRECATED RDW RBC AUTO: 46.2 FL (ref 37–54)
EGFRCR SERPLBLD CKD-EPI 2021: 61.5 ML/MIN/1.73
EOSINOPHIL # BLD AUTO: 0.06 10*3/MM3 (ref 0–0.4)
EOSINOPHIL NFR BLD AUTO: 0.2 % (ref 0.3–6.2)
ERYTHROCYTE [DISTWIDTH] IN BLOOD BY AUTOMATED COUNT: 13.8 % (ref 12.3–15.4)
ERYTHROCYTE [SEDIMENTATION RATE] IN BLOOD: 16 MM/HR (ref 0–20)
FLUAV SUBTYP SPEC NAA+PROBE: NOT DETECTED
FLUAV SUBTYP SPEC NAA+PROBE: NOT DETECTED
FLUBV RNA ISLT QL NAA+PROBE: NOT DETECTED
FLUBV RNA ISLT QL NAA+PROBE: NOT DETECTED
GLOBULIN UR ELPH-MCNC: 3.1 GM/DL
GLUCOSE BLDC GLUCOMTR-MCNC: 106 MG/DL (ref 70–130)
GLUCOSE SERPL-MCNC: 109 MG/DL (ref 65–99)
GLUCOSE UR STRIP-MCNC: NEGATIVE MG/DL
HADV DNA SPEC NAA+PROBE: NOT DETECTED
HCO3 BLDA-SCNC: 26.5 MMOL/L (ref 20–26)
HCOV 229E RNA SPEC QL NAA+PROBE: NOT DETECTED
HCOV HKU1 RNA SPEC QL NAA+PROBE: NOT DETECTED
HCOV NL63 RNA SPEC QL NAA+PROBE: NOT DETECTED
HCOV OC43 RNA SPEC QL NAA+PROBE: NOT DETECTED
HCT VFR BLD AUTO: 48 % (ref 37.5–51)
HCT VFR BLD CALC: 49.4 % (ref 38–51)
HGB BLD-MCNC: 16.4 G/DL (ref 13–17.7)
HGB BLDA-MCNC: 16.1 G/DL (ref 14–18)
HGB UR QL STRIP.AUTO: NEGATIVE
HMPV RNA NPH QL NAA+NON-PROBE: NOT DETECTED
HOLD SPECIMEN: NORMAL
HOLD SPECIMEN: NORMAL
HPIV1 RNA ISLT QL NAA+PROBE: DETECTED
HPIV2 RNA SPEC QL NAA+PROBE: NOT DETECTED
HPIV3 RNA NPH QL NAA+PROBE: NOT DETECTED
HPIV4 P GENE NPH QL NAA+PROBE: NOT DETECTED
HYALINE CASTS UR QL AUTO: NORMAL /LPF
IMM GRANULOCYTES # BLD AUTO: 0.21 10*3/MM3 (ref 0–0.05)
IMM GRANULOCYTES NFR BLD AUTO: 0.8 % (ref 0–0.5)
INHALED O2 CONCENTRATION: 21 %
KETONES UR QL STRIP: NEGATIVE
LEUKOCYTE ESTERASE UR QL STRIP.AUTO: NEGATIVE
LYMPHOCYTES # BLD AUTO: 5.85 10*3/MM3 (ref 0.7–3.1)
LYMPHOCYTES NFR BLD AUTO: 21.6 % (ref 19.6–45.3)
Lab: ABNORMAL
M PNEUMO IGG SER IA-ACNC: NOT DETECTED
MAGNESIUM SERPL-MCNC: 2.1 MG/DL (ref 1.6–2.4)
MCH RBC QN AUTO: 31.1 PG (ref 26.6–33)
MCHC RBC AUTO-ENTMCNC: 34.2 G/DL (ref 31.5–35.7)
MCV RBC AUTO: 91.1 FL (ref 79–97)
METHGB BLD QL: <-0.1 % (ref 0–3)
MODALITY: ABNORMAL
MONOCYTES # BLD AUTO: 1.33 10*3/MM3 (ref 0.1–0.9)
MONOCYTES NFR BLD AUTO: 4.9 % (ref 5–12)
NEUTROPHILS NFR BLD AUTO: 19.62 10*3/MM3 (ref 1.7–7)
NEUTROPHILS NFR BLD AUTO: 72.3 % (ref 42.7–76)
NITRITE UR QL STRIP: NEGATIVE
NOTE: ABNORMAL
NRBC BLD AUTO-RTO: 0 /100 WBC (ref 0–0.2)
NT-PROBNP SERPL-MCNC: 147.9 PG/ML (ref 0–1800)
OXYHGB MFR BLDV: 90.5 % (ref 94–99)
PCO2 BLDA: 37.9 MM HG (ref 35–45)
PCO2 TEMP ADJ BLD: ABNORMAL MM[HG]
PH BLDA: 7.45 PH UNITS (ref 7.35–7.45)
PH UR STRIP.AUTO: 7.5 [PH] (ref 5–8)
PH, TEMP CORRECTED: ABNORMAL
PLATELET # BLD AUTO: 215 10*3/MM3 (ref 140–450)
PMV BLD AUTO: 10 FL (ref 6–12)
PO2 BLDA: 55.8 MM HG (ref 83–108)
PO2 TEMP ADJ BLD: ABNORMAL MM[HG]
POTASSIUM SERPL-SCNC: 3.4 MMOL/L (ref 3.5–5.2)
PROCALCITONIN SERPL-MCNC: 0.06 NG/ML (ref 0–0.25)
PROT SERPL-MCNC: 7.1 G/DL (ref 6–8.5)
PROT UR QL STRIP: ABNORMAL
QT INTERVAL: 362 MS
QTC INTERVAL: 422 MS
RBC # BLD AUTO: 5.27 10*6/MM3 (ref 4.14–5.8)
RBC # UR STRIP: NORMAL /HPF
REF LAB TEST METHOD: NORMAL
RHINOVIRUS RNA SPEC NAA+PROBE: NOT DETECTED
RSV RNA NPH QL NAA+NON-PROBE: NOT DETECTED
SAO2 % BLDCOA: 91.1 % (ref 94–99)
SARS-COV-2 RNA NPH QL NAA+NON-PROBE: NOT DETECTED
SARS-COV-2 RNA PNL SPEC NAA+PROBE: NOT DETECTED
SODIUM SERPL-SCNC: 139 MMOL/L (ref 136–145)
SP GR UR STRIP: 1.01 (ref 1–1.03)
SQUAMOUS #/AREA URNS HPF: NORMAL /HPF
TROPONIN T SERPL-MCNC: <0.01 NG/ML (ref 0–0.03)
TSH SERPL DL<=0.05 MIU/L-ACNC: 1.41 UIU/ML (ref 0.27–4.2)
UROBILINOGEN UR QL STRIP: ABNORMAL
VENTILATOR MODE: ABNORMAL
WBC # UR STRIP: NORMAL /HPF
WBC NRBC COR # BLD: 27.13 10*3/MM3 (ref 3.4–10.8)
WHOLE BLOOD HOLD COAG: NORMAL
WHOLE BLOOD HOLD SPECIMEN: NORMAL

## 2022-11-24 PROCEDURE — 99223 1ST HOSP IP/OBS HIGH 75: CPT | Performed by: HOSPITALIST

## 2022-11-24 PROCEDURE — 84145 PROCALCITONIN (PCT): CPT | Performed by: HOSPITALIST

## 2022-11-24 PROCEDURE — 94799 UNLISTED PULMONARY SVC/PX: CPT

## 2022-11-24 PROCEDURE — 85025 COMPLETE CBC W/AUTO DIFF WBC: CPT | Performed by: PHYSICIAN ASSISTANT

## 2022-11-24 PROCEDURE — 84484 ASSAY OF TROPONIN QUANT: CPT | Performed by: PHYSICIAN ASSISTANT

## 2022-11-24 PROCEDURE — 82805 BLOOD GASES W/O2 SATURATION: CPT

## 2022-11-24 PROCEDURE — 82375 ASSAY CARBOXYHB QUANT: CPT

## 2022-11-24 PROCEDURE — 36600 WITHDRAWAL OF ARTERIAL BLOOD: CPT

## 2022-11-24 PROCEDURE — 87636 SARSCOV2 & INF A&B AMP PRB: CPT | Performed by: PHYSICIAN ASSISTANT

## 2022-11-24 PROCEDURE — 93005 ELECTROCARDIOGRAM TRACING: CPT | Performed by: PHYSICIAN ASSISTANT

## 2022-11-24 PROCEDURE — 84443 ASSAY THYROID STIM HORMONE: CPT | Performed by: HOSPITALIST

## 2022-11-24 PROCEDURE — 82962 GLUCOSE BLOOD TEST: CPT

## 2022-11-24 PROCEDURE — 85652 RBC SED RATE AUTOMATED: CPT | Performed by: PHYSICIAN ASSISTANT

## 2022-11-24 PROCEDURE — 87040 BLOOD CULTURE FOR BACTERIA: CPT | Performed by: PHYSICIAN ASSISTANT

## 2022-11-24 PROCEDURE — 71045 X-RAY EXAM CHEST 1 VIEW: CPT

## 2022-11-24 PROCEDURE — 83605 ASSAY OF LACTIC ACID: CPT | Performed by: PHYSICIAN ASSISTANT

## 2022-11-24 PROCEDURE — 83735 ASSAY OF MAGNESIUM: CPT | Performed by: HOSPITALIST

## 2022-11-24 PROCEDURE — 99285 EMERGENCY DEPT VISIT HI MDM: CPT

## 2022-11-24 PROCEDURE — 71250 CT THORAX DX C-: CPT

## 2022-11-24 PROCEDURE — 25010000002 METHYLPREDNISOLONE PER 40 MG: Performed by: HOSPITALIST

## 2022-11-24 PROCEDURE — 25010000002 HEPARIN (PORCINE) PER 1000 UNITS: Performed by: HOSPITALIST

## 2022-11-24 PROCEDURE — 0202U NFCT DS 22 TRGT SARS-COV-2: CPT | Performed by: HOSPITALIST

## 2022-11-24 PROCEDURE — 25010000002 CEFTRIAXONE PER 250 MG: Performed by: PHYSICIAN ASSISTANT

## 2022-11-24 PROCEDURE — 83880 ASSAY OF NATRIURETIC PEPTIDE: CPT | Performed by: PHYSICIAN ASSISTANT

## 2022-11-24 PROCEDURE — 86140 C-REACTIVE PROTEIN: CPT | Performed by: PHYSICIAN ASSISTANT

## 2022-11-24 PROCEDURE — 80053 COMPREHEN METABOLIC PANEL: CPT | Performed by: PHYSICIAN ASSISTANT

## 2022-11-24 PROCEDURE — 81001 URINALYSIS AUTO W/SCOPE: CPT | Performed by: PHYSICIAN ASSISTANT

## 2022-11-24 PROCEDURE — 36415 COLL VENOUS BLD VENIPUNCTURE: CPT

## 2022-11-24 PROCEDURE — 83050 HGB METHEMOGLOBIN QUAN: CPT

## 2022-11-24 PROCEDURE — 94640 AIRWAY INHALATION TREATMENT: CPT

## 2022-11-24 RX ORDER — POTASSIUM CHLORIDE 1.5 G/1.77G
40 POWDER, FOR SOLUTION ORAL AS NEEDED
Status: DISCONTINUED | OUTPATIENT
Start: 2022-11-24 | End: 2022-11-26 | Stop reason: HOSPADM

## 2022-11-24 RX ORDER — SODIUM CHLORIDE 9 MG/ML
40 INJECTION, SOLUTION INTRAVENOUS AS NEEDED
Status: DISCONTINUED | OUTPATIENT
Start: 2022-11-24 | End: 2022-11-26 | Stop reason: HOSPADM

## 2022-11-24 RX ORDER — METHYLPREDNISOLONE SODIUM SUCCINATE 40 MG/ML
40 INJECTION, POWDER, LYOPHILIZED, FOR SOLUTION INTRAMUSCULAR; INTRAVENOUS EVERY 12 HOURS
Status: DISCONTINUED | OUTPATIENT
Start: 2022-11-24 | End: 2022-11-26 | Stop reason: HOSPADM

## 2022-11-24 RX ORDER — SODIUM CHLORIDE 0.9 % (FLUSH) 0.9 %
10 SYRINGE (ML) INJECTION AS NEEDED
Status: DISCONTINUED | OUTPATIENT
Start: 2022-11-24 | End: 2022-11-26 | Stop reason: HOSPADM

## 2022-11-24 RX ORDER — SODIUM CHLORIDE 0.9 % (FLUSH) 0.9 %
10 SYRINGE (ML) INJECTION EVERY 12 HOURS SCHEDULED
Status: DISCONTINUED | OUTPATIENT
Start: 2022-11-24 | End: 2022-11-26 | Stop reason: HOSPADM

## 2022-11-24 RX ORDER — MAGNESIUM SULFATE 1 G/100ML
1 INJECTION INTRAVENOUS AS NEEDED
Status: DISCONTINUED | OUTPATIENT
Start: 2022-11-24 | End: 2022-11-26 | Stop reason: HOSPADM

## 2022-11-24 RX ORDER — ALBUTEROL SULFATE 2.5 MG/3ML
2.5 SOLUTION RESPIRATORY (INHALATION) EVERY 4 HOURS PRN
Status: CANCELLED | OUTPATIENT
Start: 2022-11-24

## 2022-11-24 RX ORDER — METOPROLOL SUCCINATE 50 MG/1
50 TABLET, EXTENDED RELEASE ORAL DAILY
Status: DISCONTINUED | OUTPATIENT
Start: 2022-11-25 | End: 2022-11-26 | Stop reason: HOSPADM

## 2022-11-24 RX ORDER — POTASSIUM CHLORIDE 7.45 MG/ML
10 INJECTION INTRAVENOUS
Status: DISCONTINUED | OUTPATIENT
Start: 2022-11-24 | End: 2022-11-26 | Stop reason: HOSPADM

## 2022-11-24 RX ORDER — POTASSIUM CHLORIDE 20 MEQ/1
40 TABLET, EXTENDED RELEASE ORAL EVERY 4 HOURS
Status: COMPLETED | OUTPATIENT
Start: 2022-11-24 | End: 2022-11-25

## 2022-11-24 RX ORDER — ACETAMINOPHEN 325 MG/1
325 TABLET ORAL EVERY 6 HOURS PRN
Status: DISCONTINUED | OUTPATIENT
Start: 2022-11-24 | End: 2022-11-26 | Stop reason: HOSPADM

## 2022-11-24 RX ORDER — HYDRALAZINE HYDROCHLORIDE 10 MG/1
10 TABLET, FILM COATED ORAL EVERY 8 HOURS PRN
Status: DISCONTINUED | OUTPATIENT
Start: 2022-11-24 | End: 2022-11-26 | Stop reason: HOSPADM

## 2022-11-24 RX ORDER — MAGNESIUM SULFATE HEPTAHYDRATE 40 MG/ML
4 INJECTION, SOLUTION INTRAVENOUS AS NEEDED
Status: DISCONTINUED | OUTPATIENT
Start: 2022-11-24 | End: 2022-11-26 | Stop reason: HOSPADM

## 2022-11-24 RX ORDER — MAGNESIUM SULFATE HEPTAHYDRATE 40 MG/ML
2 INJECTION, SOLUTION INTRAVENOUS AS NEEDED
Status: DISCONTINUED | OUTPATIENT
Start: 2022-11-24 | End: 2022-11-26 | Stop reason: HOSPADM

## 2022-11-24 RX ORDER — OMEGA-3S/DHA/EPA/FISH OIL/D3 300MG-1000
1000 CAPSULE ORAL DAILY
Status: DISCONTINUED | OUTPATIENT
Start: 2022-11-25 | End: 2022-11-26 | Stop reason: HOSPADM

## 2022-11-24 RX ORDER — LEVOTHYROXINE SODIUM 0.07 MG/1
112 TABLET ORAL DAILY
Status: DISCONTINUED | OUTPATIENT
Start: 2022-11-25 | End: 2022-11-26 | Stop reason: HOSPADM

## 2022-11-24 RX ORDER — HEPARIN SODIUM 5000 [USP'U]/ML
5000 INJECTION, SOLUTION INTRAVENOUS; SUBCUTANEOUS EVERY 12 HOURS SCHEDULED
Status: DISCONTINUED | OUTPATIENT
Start: 2022-11-24 | End: 2022-11-26 | Stop reason: HOSPADM

## 2022-11-24 RX ORDER — NITROGLYCERIN 0.4 MG/1
0.4 TABLET SUBLINGUAL
Status: DISCONTINUED | OUTPATIENT
Start: 2022-11-24 | End: 2022-11-26 | Stop reason: HOSPADM

## 2022-11-24 RX ORDER — IPRATROPIUM BROMIDE AND ALBUTEROL SULFATE 2.5; .5 MG/3ML; MG/3ML
3 SOLUTION RESPIRATORY (INHALATION) ONCE
Status: COMPLETED | OUTPATIENT
Start: 2022-11-24 | End: 2022-11-24

## 2022-11-24 RX ORDER — AMLODIPINE BESYLATE 5 MG/1
5 TABLET ORAL NIGHTLY
Status: DISCONTINUED | OUTPATIENT
Start: 2022-11-25 | End: 2022-11-26 | Stop reason: HOSPADM

## 2022-11-24 RX ORDER — BUDESONIDE AND FORMOTEROL FUMARATE DIHYDRATE 160; 4.5 UG/1; UG/1
2 AEROSOL RESPIRATORY (INHALATION)
Status: DISCONTINUED | OUTPATIENT
Start: 2022-11-24 | End: 2022-11-26 | Stop reason: HOSPADM

## 2022-11-24 RX ORDER — POTASSIUM CHLORIDE 750 MG/1
40 CAPSULE, EXTENDED RELEASE ORAL AS NEEDED
Status: DISCONTINUED | OUTPATIENT
Start: 2022-11-24 | End: 2022-11-26 | Stop reason: HOSPADM

## 2022-11-24 RX ORDER — MELATONIN
1000 DAILY
COMMUNITY

## 2022-11-24 RX ORDER — IPRATROPIUM BROMIDE AND ALBUTEROL SULFATE 2.5; .5 MG/3ML; MG/3ML
3 SOLUTION RESPIRATORY (INHALATION)
Status: DISCONTINUED | OUTPATIENT
Start: 2022-11-24 | End: 2022-11-26 | Stop reason: HOSPADM

## 2022-11-24 RX ADMIN — Medication 10 ML: at 23:17

## 2022-11-24 RX ADMIN — IPRATROPIUM BROMIDE 0.5 MG: 0.5 SOLUTION RESPIRATORY (INHALATION) at 20:15

## 2022-11-24 RX ADMIN — SODIUM CHLORIDE 2 G: 9 INJECTION, SOLUTION INTRAVENOUS at 19:10

## 2022-11-24 RX ADMIN — HEPARIN SODIUM 5000 UNITS: 5000 INJECTION INTRAVENOUS; SUBCUTANEOUS at 23:16

## 2022-11-24 RX ADMIN — IPRATROPIUM BROMIDE AND ALBUTEROL SULFATE 3 ML: .5; 2.5 SOLUTION RESPIRATORY (INHALATION) at 16:41

## 2022-11-24 RX ADMIN — POTASSIUM CHLORIDE 40 MEQ: 20 TABLET, EXTENDED RELEASE ORAL at 23:15

## 2022-11-24 RX ADMIN — METHYLPREDNISOLONE SODIUM SUCCINATE 40 MG: 40 INJECTION, POWDER, FOR SOLUTION INTRAMUSCULAR; INTRAVENOUS at 20:13

## 2022-11-24 RX ADMIN — DOXYCYCLINE 100 MG: 100 INJECTION, POWDER, LYOPHILIZED, FOR SOLUTION INTRAVENOUS at 20:14

## 2022-11-25 ENCOUNTER — APPOINTMENT (OUTPATIENT)
Dept: ULTRASOUND IMAGING | Facility: HOSPITAL | Age: 82
End: 2022-11-25

## 2022-11-25 ENCOUNTER — APPOINTMENT (OUTPATIENT)
Dept: CARDIOLOGY | Facility: HOSPITAL | Age: 82
End: 2022-11-25

## 2022-11-25 LAB
ALBUMIN SERPL-MCNC: 3.83 G/DL (ref 3.5–5.2)
ALBUMIN/GLOB SERPL: 1.3 G/DL
ALP SERPL-CCNC: 85 U/L (ref 39–117)
ALT SERPL W P-5'-P-CCNC: 14 U/L (ref 1–41)
ANION GAP SERPL CALCULATED.3IONS-SCNC: 15.6 MMOL/L (ref 5–15)
AST SERPL-CCNC: 12 U/L (ref 1–40)
BASOPHILS # BLD AUTO: 0.05 10*3/MM3 (ref 0–0.2)
BASOPHILS NFR BLD AUTO: 0.2 % (ref 0–1.5)
BH CV ECHO MEAS - ACS: 2.5 CM
BH CV ECHO MEAS - AO MAX PG: 14.6 MMHG
BH CV ECHO MEAS - AO MEAN PG: 7 MMHG
BH CV ECHO MEAS - AO ROOT DIAM: 3.7 CM
BH CV ECHO MEAS - AO V2 MAX: 191 CM/SEC
BH CV ECHO MEAS - AO V2 VTI: 30.7 CM
BH CV ECHO MEAS - AVA(I,D): 3.3 CM2
BH CV ECHO MEAS - EDV(CUBED): 113 ML
BH CV ECHO MEAS - EDV(MOD-SP4): 114 ML
BH CV ECHO MEAS - EF(MOD-SP4): 68.7 %
BH CV ECHO MEAS - ESV(CUBED): 35.4 ML
BH CV ECHO MEAS - ESV(MOD-SP4): 35.7 ML
BH CV ECHO MEAS - FS: 32.1 %
BH CV ECHO MEAS - IVS/LVPW: 1.01 CM
BH CV ECHO MEAS - IVSD: 1.16 CM
BH CV ECHO MEAS - LA DIMENSION: 3.4 CM
BH CV ECHO MEAS - LAT PEAK E' VEL: 11.2 CM/SEC
BH CV ECHO MEAS - LV DIASTOLIC VOL/BSA (35-75): 53.3 CM2
BH CV ECHO MEAS - LV MASS(C)D: 208.8 GRAMS
BH CV ECHO MEAS - LV MAX PG: 6 MMHG
BH CV ECHO MEAS - LV MEAN PG: 3 MMHG
BH CV ECHO MEAS - LV SYSTOLIC VOL/BSA (12-30): 16.7 CM2
BH CV ECHO MEAS - LV V1 MAX: 122 CM/SEC
BH CV ECHO MEAS - LV V1 VTI: 24.7 CM
BH CV ECHO MEAS - LVIDD: 4.8 CM
BH CV ECHO MEAS - LVIDS: 3.3 CM
BH CV ECHO MEAS - LVOT AREA: 4.2 CM2
BH CV ECHO MEAS - LVOT DIAM: 2.3 CM
BH CV ECHO MEAS - LVPWD: 1.15 CM
BH CV ECHO MEAS - MED PEAK E' VEL: 6.1 CM/SEC
BH CV ECHO MEAS - MV A MAX VEL: 113 CM/SEC
BH CV ECHO MEAS - MV E MAX VEL: 69.5 CM/SEC
BH CV ECHO MEAS - MV E/A: 0.62
BH CV ECHO MEAS - PA ACC TIME: 0.09 SEC
BH CV ECHO MEAS - PA PR(ACCEL): 40.8 MMHG
BH CV ECHO MEAS - SI(MOD-SP4): 36.6 ML/M2
BH CV ECHO MEAS - SV(LVOT): 102.6 ML
BH CV ECHO MEAS - SV(MOD-SP4): 78.3 ML
BH CV ECHO MEAS - TAPSE (>1.6): 2.04 CM
BH CV ECHO MEASUREMENTS AVERAGE E/E' RATIO: 8.03
BILIRUB SERPL-MCNC: 0.5 MG/DL (ref 0–1.2)
BUN SERPL-MCNC: 18 MG/DL (ref 8–23)
BUN/CREAT SERPL: 14.9 (ref 7–25)
CALCIUM SPEC-SCNC: 9 MG/DL (ref 8.6–10.5)
CHLORIDE SERPL-SCNC: 99 MMOL/L (ref 98–107)
CO2 SERPL-SCNC: 22.4 MMOL/L (ref 22–29)
CREAT SERPL-MCNC: 1.21 MG/DL (ref 0.76–1.27)
CRP SERPL-MCNC: 4.35 MG/DL (ref 0–0.5)
DEPRECATED RDW RBC AUTO: 47.3 FL (ref 37–54)
EGFRCR SERPLBLD CKD-EPI 2021: 59 ML/MIN/1.73
EOSINOPHIL # BLD AUTO: 0 10*3/MM3 (ref 0–0.4)
EOSINOPHIL NFR BLD AUTO: 0 % (ref 0.3–6.2)
ERYTHROCYTE [DISTWIDTH] IN BLOOD BY AUTOMATED COUNT: 13.8 % (ref 12.3–15.4)
GLOBULIN UR ELPH-MCNC: 3 GM/DL
GLUCOSE SERPL-MCNC: 214 MG/DL (ref 65–99)
HCT VFR BLD AUTO: 47.5 % (ref 37.5–51)
HGB BLD-MCNC: 15.3 G/DL (ref 13–17.7)
IMM GRANULOCYTES # BLD AUTO: 0.26 10*3/MM3 (ref 0–0.05)
IMM GRANULOCYTES NFR BLD AUTO: 0.8 % (ref 0–0.5)
L PNEUMO1 AG UR QL IA: NEGATIVE
LEFT ATRIUM VOLUME INDEX: 16.7 ML/M2
LYMPHOCYTES # BLD AUTO: 3.97 10*3/MM3 (ref 0.7–3.1)
LYMPHOCYTES NFR BLD AUTO: 13 % (ref 19.6–45.3)
MAXIMAL PREDICTED HEART RATE: 136 BPM
MCH RBC QN AUTO: 29.9 PG (ref 26.6–33)
MCHC RBC AUTO-ENTMCNC: 32.2 G/DL (ref 31.5–35.7)
MCV RBC AUTO: 93 FL (ref 79–97)
MONOCYTES # BLD AUTO: 0.54 10*3/MM3 (ref 0.1–0.9)
MONOCYTES NFR BLD AUTO: 1.8 % (ref 5–12)
MRSA DNA SPEC QL NAA+PROBE: NORMAL
NEUTROPHILS NFR BLD AUTO: 25.8 10*3/MM3 (ref 1.7–7)
NEUTROPHILS NFR BLD AUTO: 84.2 % (ref 42.7–76)
NRBC BLD AUTO-RTO: 0 /100 WBC (ref 0–0.2)
PLATELET # BLD AUTO: 195 10*3/MM3 (ref 140–450)
PMV BLD AUTO: 10.4 FL (ref 6–12)
POTASSIUM SERPL-SCNC: 3.7 MMOL/L (ref 3.5–5.2)
PROCALCITONIN SERPL-MCNC: 0.26 NG/ML (ref 0–0.25)
PROT SERPL-MCNC: 6.8 G/DL (ref 6–8.5)
RBC # BLD AUTO: 5.11 10*6/MM3 (ref 4.14–5.8)
S PNEUM AG SPEC QL LA: NEGATIVE
SODIUM SERPL-SCNC: 137 MMOL/L (ref 136–145)
STRESS TARGET HR: 116 BPM
WBC NRBC COR # BLD: 30.62 10*3/MM3 (ref 3.4–10.8)

## 2022-11-25 PROCEDURE — 80053 COMPREHEN METABOLIC PANEL: CPT | Performed by: HOSPITALIST

## 2022-11-25 PROCEDURE — 25010000002 METHYLPREDNISOLONE PER 40 MG: Performed by: HOSPITALIST

## 2022-11-25 PROCEDURE — 94761 N-INVAS EAR/PLS OXIMETRY MLT: CPT

## 2022-11-25 PROCEDURE — 84145 PROCALCITONIN (PCT): CPT | Performed by: HOSPITALIST

## 2022-11-25 PROCEDURE — 76775 US EXAM ABDO BACK WALL LIM: CPT

## 2022-11-25 PROCEDURE — 87070 CULTURE OTHR SPECIMN AEROBIC: CPT | Performed by: STUDENT IN AN ORGANIZED HEALTH CARE EDUCATION/TRAINING PROGRAM

## 2022-11-25 PROCEDURE — 87899 AGENT NOS ASSAY W/OPTIC: CPT | Performed by: STUDENT IN AN ORGANIZED HEALTH CARE EDUCATION/TRAINING PROGRAM

## 2022-11-25 PROCEDURE — 87641 MR-STAPH DNA AMP PROBE: CPT | Performed by: STUDENT IN AN ORGANIZED HEALTH CARE EDUCATION/TRAINING PROGRAM

## 2022-11-25 PROCEDURE — 76775 US EXAM ABDO BACK WALL LIM: CPT | Performed by: RADIOLOGY

## 2022-11-25 PROCEDURE — 87205 SMEAR GRAM STAIN: CPT | Performed by: STUDENT IN AN ORGANIZED HEALTH CARE EDUCATION/TRAINING PROGRAM

## 2022-11-25 PROCEDURE — 99232 SBSQ HOSP IP/OBS MODERATE 35: CPT | Performed by: STUDENT IN AN ORGANIZED HEALTH CARE EDUCATION/TRAINING PROGRAM

## 2022-11-25 PROCEDURE — 93306 TTE W/DOPPLER COMPLETE: CPT

## 2022-11-25 PROCEDURE — 85025 COMPLETE CBC W/AUTO DIFF WBC: CPT | Performed by: HOSPITALIST

## 2022-11-25 PROCEDURE — 25010000002 CEFTRIAXONE PER 250 MG: Performed by: HOSPITALIST

## 2022-11-25 PROCEDURE — 94799 UNLISTED PULMONARY SVC/PX: CPT

## 2022-11-25 PROCEDURE — 94664 DEMO&/EVAL PT USE INHALER: CPT

## 2022-11-25 PROCEDURE — 87449 NOS EACH ORGANISM AG IA: CPT | Performed by: STUDENT IN AN ORGANIZED HEALTH CARE EDUCATION/TRAINING PROGRAM

## 2022-11-25 PROCEDURE — 25010000002 HEPARIN (PORCINE) PER 1000 UNITS: Performed by: HOSPITALIST

## 2022-11-25 PROCEDURE — 86140 C-REACTIVE PROTEIN: CPT | Performed by: HOSPITALIST

## 2022-11-25 RX ORDER — HYDROXYZINE HYDROCHLORIDE 25 MG/1
25 TABLET, FILM COATED ORAL 3 TIMES DAILY PRN
Status: DISCONTINUED | OUTPATIENT
Start: 2022-11-25 | End: 2022-11-25

## 2022-11-25 RX ORDER — HYDROXYZINE HYDROCHLORIDE 25 MG/1
25 TABLET, FILM COATED ORAL 2 TIMES DAILY PRN
Status: DISCONTINUED | OUTPATIENT
Start: 2022-11-25 | End: 2022-11-26 | Stop reason: HOSPADM

## 2022-11-25 RX ORDER — CHOLECALCIFEROL (VITAMIN D3) 125 MCG
5 CAPSULE ORAL NIGHTLY PRN
Status: DISCONTINUED | OUTPATIENT
Start: 2022-11-25 | End: 2022-11-26 | Stop reason: HOSPADM

## 2022-11-25 RX ORDER — GUAIFENESIN/DEXTROMETHORPHAN 100-10MG/5
5 SYRUP ORAL EVERY 4 HOURS PRN
Status: DISCONTINUED | OUTPATIENT
Start: 2022-11-25 | End: 2022-11-26 | Stop reason: HOSPADM

## 2022-11-25 RX ADMIN — IPRATROPIUM BROMIDE AND ALBUTEROL SULFATE 3 ML: .5; 2.5 SOLUTION RESPIRATORY (INHALATION) at 13:24

## 2022-11-25 RX ADMIN — DOXYCYCLINE 100 MG: 100 INJECTION, POWDER, LYOPHILIZED, FOR SOLUTION INTRAVENOUS at 11:00

## 2022-11-25 RX ADMIN — CHOLECALCIFEROL TAB 10 MCG (400 UNIT) 1000 UNITS: 10 TAB at 10:59

## 2022-11-25 RX ADMIN — METHYLPREDNISOLONE SODIUM SUCCINATE 40 MG: 40 INJECTION, POWDER, FOR SOLUTION INTRAMUSCULAR; INTRAVENOUS at 19:52

## 2022-11-25 RX ADMIN — METHYLPREDNISOLONE SODIUM SUCCINATE 40 MG: 40 INJECTION, POWDER, FOR SOLUTION INTRAMUSCULAR; INTRAVENOUS at 11:00

## 2022-11-25 RX ADMIN — LEVOTHYROXINE SODIUM 112 MCG: 0.07 TABLET ORAL at 10:58

## 2022-11-25 RX ADMIN — POTASSIUM CHLORIDE 40 MEQ: 20 TABLET, EXTENDED RELEASE ORAL at 01:00

## 2022-11-25 RX ADMIN — GUAIFENESIN AND DEXTROMETHORPHAN 5 ML: 100; 10 SYRUP ORAL at 20:27

## 2022-11-25 RX ADMIN — HEPARIN SODIUM 5000 UNITS: 5000 INJECTION INTRAVENOUS; SUBCUTANEOUS at 20:02

## 2022-11-25 RX ADMIN — AMLODIPINE BESYLATE 5 MG: 5 TABLET ORAL at 20:02

## 2022-11-25 RX ADMIN — IPRATROPIUM BROMIDE AND ALBUTEROL SULFATE 3 ML: .5; 2.5 SOLUTION RESPIRATORY (INHALATION) at 07:45

## 2022-11-25 RX ADMIN — GUAIFENESIN AND DEXTROMETHORPHAN 5 ML: 100; 10 SYRUP ORAL at 00:41

## 2022-11-25 RX ADMIN — BUDESONIDE AND FORMOTEROL FUMARATE DIHYDRATE 2 PUFF: 160; 4.5 AEROSOL RESPIRATORY (INHALATION) at 00:29

## 2022-11-25 RX ADMIN — IPRATROPIUM BROMIDE AND ALBUTEROL SULFATE 3 ML: .5; 2.5 SOLUTION RESPIRATORY (INHALATION) at 00:29

## 2022-11-25 RX ADMIN — DOXYCYCLINE 100 MG: 100 INJECTION, POWDER, LYOPHILIZED, FOR SOLUTION INTRAVENOUS at 19:52

## 2022-11-25 RX ADMIN — HEPARIN SODIUM 5000 UNITS: 5000 INJECTION INTRAVENOUS; SUBCUTANEOUS at 10:59

## 2022-11-25 RX ADMIN — AMLODIPINE BESYLATE 5 MG: 5 TABLET ORAL at 00:41

## 2022-11-25 RX ADMIN — Medication 10 ML: at 20:03

## 2022-11-25 RX ADMIN — BUDESONIDE AND FORMOTEROL FUMARATE DIHYDRATE 2 PUFF: 160; 4.5 AEROSOL RESPIRATORY (INHALATION) at 19:09

## 2022-11-25 RX ADMIN — IPRATROPIUM BROMIDE AND ALBUTEROL SULFATE 3 ML: .5; 2.5 SOLUTION RESPIRATORY (INHALATION) at 19:09

## 2022-11-25 RX ADMIN — HYDROXYZINE HYDROCHLORIDE 25 MG: 25 TABLET ORAL at 15:37

## 2022-11-25 RX ADMIN — CEFTRIAXONE 1 G: 1 INJECTION, POWDER, FOR SOLUTION INTRAMUSCULAR; INTRAVENOUS at 18:34

## 2022-11-25 RX ADMIN — Medication 5 MG: at 20:27

## 2022-11-25 RX ADMIN — BUDESONIDE AND FORMOTEROL FUMARATE DIHYDRATE 2 PUFF: 160; 4.5 AEROSOL RESPIRATORY (INHALATION) at 07:45

## 2022-11-25 RX ADMIN — Medication 10 ML: at 11:00

## 2022-11-25 RX ADMIN — METOPROLOL SUCCINATE 50 MG: 50 TABLET, FILM COATED, EXTENDED RELEASE ORAL at 17:05

## 2022-11-26 VITALS
SYSTOLIC BLOOD PRESSURE: 116 MMHG | HEIGHT: 69 IN | BODY MASS INDEX: 32.64 KG/M2 | WEIGHT: 220.4 LBS | DIASTOLIC BLOOD PRESSURE: 70 MMHG | HEART RATE: 63 BPM | RESPIRATION RATE: 12 BRPM | TEMPERATURE: 98.4 F | OXYGEN SATURATION: 96 %

## 2022-11-26 LAB
ANION GAP SERPL CALCULATED.3IONS-SCNC: 11.4 MMOL/L (ref 5–15)
BASOPHILS # BLD AUTO: 0.02 10*3/MM3 (ref 0–0.2)
BASOPHILS NFR BLD AUTO: 0.1 % (ref 0–1.5)
BUN SERPL-MCNC: 23 MG/DL (ref 8–23)
BUN/CREAT SERPL: 19.2 (ref 7–25)
CALCIUM SPEC-SCNC: 9.6 MG/DL (ref 8.6–10.5)
CHLORIDE SERPL-SCNC: 103 MMOL/L (ref 98–107)
CO2 SERPL-SCNC: 21.6 MMOL/L (ref 22–29)
CREAT SERPL-MCNC: 1.2 MG/DL (ref 0.76–1.27)
CRP SERPL-MCNC: 4.77 MG/DL (ref 0–0.5)
DEPRECATED RDW RBC AUTO: 48.4 FL (ref 37–54)
EGFRCR SERPLBLD CKD-EPI 2021: 59.6 ML/MIN/1.73
EOSINOPHIL # BLD AUTO: 0.01 10*3/MM3 (ref 0–0.4)
EOSINOPHIL NFR BLD AUTO: 0 % (ref 0.3–6.2)
ERYTHROCYTE [DISTWIDTH] IN BLOOD BY AUTOMATED COUNT: 14 % (ref 12.3–15.4)
GLUCOSE SERPL-MCNC: 178 MG/DL (ref 65–99)
HCT VFR BLD AUTO: 44.3 % (ref 37.5–51)
HGB BLD-MCNC: 14.5 G/DL (ref 13–17.7)
IMM GRANULOCYTES # BLD AUTO: 0.2 10*3/MM3 (ref 0–0.05)
IMM GRANULOCYTES NFR BLD AUTO: 0.9 % (ref 0–0.5)
LYMPHOCYTES # BLD AUTO: 4.62 10*3/MM3 (ref 0.7–3.1)
LYMPHOCYTES NFR BLD AUTO: 21.1 % (ref 19.6–45.3)
MCH RBC QN AUTO: 30.5 PG (ref 26.6–33)
MCHC RBC AUTO-ENTMCNC: 32.7 G/DL (ref 31.5–35.7)
MCV RBC AUTO: 93.1 FL (ref 79–97)
MONOCYTES # BLD AUTO: 0.33 10*3/MM3 (ref 0.1–0.9)
MONOCYTES NFR BLD AUTO: 1.5 % (ref 5–12)
NEUTROPHILS NFR BLD AUTO: 16.71 10*3/MM3 (ref 1.7–7)
NEUTROPHILS NFR BLD AUTO: 76.4 % (ref 42.7–76)
NRBC BLD AUTO-RTO: 0 /100 WBC (ref 0–0.2)
PLATELET # BLD AUTO: 218 10*3/MM3 (ref 140–450)
PMV BLD AUTO: 10.7 FL (ref 6–12)
POTASSIUM SERPL-SCNC: 4.6 MMOL/L (ref 3.5–5.2)
RBC # BLD AUTO: 4.76 10*6/MM3 (ref 4.14–5.8)
SODIUM SERPL-SCNC: 136 MMOL/L (ref 136–145)
WBC NRBC COR # BLD: 21.89 10*3/MM3 (ref 3.4–10.8)

## 2022-11-26 PROCEDURE — 94799 UNLISTED PULMONARY SVC/PX: CPT

## 2022-11-26 PROCEDURE — 99239 HOSP IP/OBS DSCHRG MGMT >30: CPT | Performed by: STUDENT IN AN ORGANIZED HEALTH CARE EDUCATION/TRAINING PROGRAM

## 2022-11-26 PROCEDURE — 86140 C-REACTIVE PROTEIN: CPT | Performed by: STUDENT IN AN ORGANIZED HEALTH CARE EDUCATION/TRAINING PROGRAM

## 2022-11-26 PROCEDURE — 25010000002 HEPARIN (PORCINE) PER 1000 UNITS: Performed by: HOSPITALIST

## 2022-11-26 PROCEDURE — 80048 BASIC METABOLIC PNL TOTAL CA: CPT | Performed by: STUDENT IN AN ORGANIZED HEALTH CARE EDUCATION/TRAINING PROGRAM

## 2022-11-26 PROCEDURE — 25010000002 METHYLPREDNISOLONE PER 40 MG: Performed by: HOSPITALIST

## 2022-11-26 PROCEDURE — 85025 COMPLETE CBC W/AUTO DIFF WBC: CPT | Performed by: STUDENT IN AN ORGANIZED HEALTH CARE EDUCATION/TRAINING PROGRAM

## 2022-11-26 RX ORDER — GUAIFENESIN/DEXTROMETHORPHAN 100-10MG/5
5 SYRUP ORAL EVERY 4 HOURS PRN
Qty: 118 ML | Refills: 0 | Status: SHIPPED | OUTPATIENT
Start: 2022-11-26 | End: 2022-12-30

## 2022-11-26 RX ORDER — AMOXICILLIN AND CLAVULANATE POTASSIUM 875; 125 MG/1; MG/1
1 TABLET, FILM COATED ORAL 2 TIMES DAILY
Qty: 6 TABLET | Refills: 0 | Status: SHIPPED | OUTPATIENT
Start: 2022-11-26 | End: 2022-12-30

## 2022-11-26 RX ORDER — PREDNISONE 20 MG/1
40 TABLET ORAL DAILY
Qty: 6 TABLET | Refills: 0 | Status: SHIPPED | OUTPATIENT
Start: 2022-11-27 | End: 2022-11-30

## 2022-11-26 RX ORDER — DOXYCYCLINE HYCLATE 100 MG/1
100 CAPSULE ORAL 2 TIMES DAILY
Qty: 7 CAPSULE | Refills: 0 | Status: SHIPPED | OUTPATIENT
Start: 2022-11-26 | End: 2022-12-30

## 2022-11-26 RX ADMIN — BUDESONIDE AND FORMOTEROL FUMARATE DIHYDRATE 2 PUFF: 160; 4.5 AEROSOL RESPIRATORY (INHALATION) at 06:52

## 2022-11-26 RX ADMIN — HEPARIN SODIUM 5000 UNITS: 5000 INJECTION INTRAVENOUS; SUBCUTANEOUS at 09:20

## 2022-11-26 RX ADMIN — LEVOTHYROXINE SODIUM 112 MCG: 0.07 TABLET ORAL at 09:19

## 2022-11-26 RX ADMIN — METOPROLOL SUCCINATE 50 MG: 50 TABLET, FILM COATED, EXTENDED RELEASE ORAL at 09:19

## 2022-11-26 RX ADMIN — Medication 10 ML: at 09:19

## 2022-11-26 RX ADMIN — IPRATROPIUM BROMIDE AND ALBUTEROL SULFATE 3 ML: .5; 2.5 SOLUTION RESPIRATORY (INHALATION) at 06:52

## 2022-11-26 RX ADMIN — METHYLPREDNISOLONE SODIUM SUCCINATE 40 MG: 40 INJECTION, POWDER, FOR SOLUTION INTRAMUSCULAR; INTRAVENOUS at 09:20

## 2022-11-26 RX ADMIN — CHOLECALCIFEROL TAB 10 MCG (400 UNIT) 1000 UNITS: 10 TAB at 09:19

## 2022-11-26 RX ADMIN — DOXYCYCLINE 100 MG: 100 INJECTION, POWDER, LYOPHILIZED, FOR SOLUTION INTRAVENOUS at 09:21

## 2022-11-27 ENCOUNTER — READMISSION MANAGEMENT (OUTPATIENT)
Dept: CALL CENTER | Facility: HOSPITAL | Age: 82
End: 2022-11-27

## 2022-11-27 LAB
BACTERIA SPEC RESP CULT: NORMAL
GRAM STN SPEC: NORMAL

## 2022-11-27 NOTE — OUTREACH NOTE
Prep Survey    Flowsheet Row Responses   [facility] facility patient discharged from? [facility]   Is LACE score < 7 ? No   Emergency Room discharge w/ pulse ox? No   Eligibility Readm Mgmt   Discharge diagnosis PNA   Does the patient have one of the following disease processes/diagnoses(primary or secondary)? Pneumonia   Does the patient have Home health ordered? No   Is there a DME ordered? No   Prep survey completed? Yes          HAYLEE TESFAYE - Registered Nurse

## 2022-11-28 ENCOUNTER — TELEPHONE (OUTPATIENT)
Dept: TELEMETRY | Facility: HOSPITAL | Age: 82
End: 2022-11-28

## 2022-11-29 ENCOUNTER — READMISSION MANAGEMENT (OUTPATIENT)
Dept: CALL CENTER | Facility: HOSPITAL | Age: 82
End: 2022-11-29

## 2022-11-29 LAB
BACTERIA SPEC AEROBE CULT: NORMAL
BACTERIA SPEC AEROBE CULT: NORMAL

## 2022-11-29 NOTE — OUTREACH NOTE
COPD/PN Week 1 Survey    Flowsheet Row Responses   [facility] patient discharged from? [facility]   Does the patient have one of the following disease processes/diagnoses(primary or secondary)? Pneumonia   Week 1 attempt successful? Yes   Call start time 1502   Call end time 1505   Discharge diagnosis PNA   Meds reviewed with patient/caregiver? Yes   Is the patient having any side effects they believe may be caused by any medication additions or changes? No   Does the patient have all medications ordered at discharge? Yes   Is the patient taking all medications as directed (includes completed medication regime)? Yes   Does the patient have a primary care provider?  Yes   Does the patient have an appointment with their PCP or specialist within 7 days of discharge? Greater than 7 days   Comments Has appt Monday   Psychosocial issues? No   Did the patient receive a copy of their discharge instructions? Yes   Nursing interventions Reviewed instructions with patient   What is the patient's perception of their health status since discharge? Improving   If the patient is a current smoker, are they able to teach back resources for cessation? Not a smoker   Is the patient/caregiver able to teach back the hierarchy of who to call/visit for symptoms/problems? PCP, Specialist, Home health nurse, Urgent Care, ED, 911 Yes   Additional teach back comments States he is doing better.  Has completed antibiotics and steroids.   Is the patient/caregiver able to teach back signs and symptoms of worsening condition: Fever/chills, Shortness of breath, Chest pain   Is the patient/caregiver able to teach back importance of completing antibiotic course of treatment? Yes   Week 1 call completed? Yes   Graduated/Revoked comments Denies questions or needs at this time.          VICKY LUI - Licensed Nurse

## 2022-12-11 NOTE — PROGRESS NOTES
"Enter Query Response Below      Query Response:     Viral and superimposed bacterial pneumonia         If applicable, please update the problem list.      Patient: Edward Rodas        : 1940  Account: [id number]           Admit Date: 2022        How to Respond to this query:       a. Click New Note     b. Answer query within the yellow box.                c. Update the Problem List, if applicable.    Dr. Barrientos,    Patient presents with shortness of breath and cough found to have parainfluenza 1.  Hospitalist Note with \"this morning patient's CRP is elevated at 4.35 and procalcitonin is elevated at 0.26, raising concern for superimposed bacterial pneumonia.\" Patient was treated with Rocephin (-), Doxycycline (-) and discharged home on oral Augmentin and oral doxycycline.     Can you please clarify?  -Superimposed bacterial pneumonia  -Viral pneumonia only  -Viral and superimposed bacterial pneumonia  -Other-specify __________  -Unable to determine    By submitting this query, we are merely seeking further clarification of documentation to accurately reflect all conditions that you are monitoring, evaluating, treating or that extend the hospitalization or utilize additional resources of care. Please utilize your independent clinical judgment when addressing the question(s) above.     This query and your response, once completed, will be entered into the legal medical record.    Sincerely,  Nhung Cline RN, BSN  [email or web address]  Clinical Documentation Integrity Program   "

## 2022-12-30 ENCOUNTER — HOSPITAL ENCOUNTER (EMERGENCY)
Facility: HOSPITAL | Age: 82
Discharge: HOME | End: 2022-12-30
Attending: STUDENT IN AN ORGANIZED HEALTH CARE EDUCATION/TRAINING PROGRAM | Admitting: STUDENT IN AN ORGANIZED HEALTH CARE EDUCATION/TRAINING PROGRAM
Payer: MEDICARE

## 2022-12-30 ENCOUNTER — TRANSCRIBE ORDERS (OUTPATIENT)
Dept: ADMINISTRATIVE | Facility: HOSPITAL | Age: 82
End: 2022-12-30

## 2022-12-30 ENCOUNTER — APPOINTMENT (OUTPATIENT)
Dept: GENERAL RADIOLOGY | Facility: HOSPITAL | Age: 82
End: 2022-12-30
Payer: MEDICARE

## 2022-12-30 VITALS
HEART RATE: 74 BPM | SYSTOLIC BLOOD PRESSURE: 149 MMHG | WEIGHT: 216 LBS | BODY MASS INDEX: 31.99 KG/M2 | HEIGHT: 69 IN | RESPIRATION RATE: 18 BRPM | DIASTOLIC BLOOD PRESSURE: 84 MMHG | OXYGEN SATURATION: 95 % | TEMPERATURE: 98 F

## 2022-12-30 DIAGNOSIS — R07.9 CHEST PAIN, UNSPECIFIED TYPE: Primary | ICD-10-CM

## 2022-12-30 DIAGNOSIS — R07.89 ATYPICAL CHEST PAIN: Primary | ICD-10-CM

## 2022-12-30 LAB
ALBUMIN SERPL-MCNC: 3.8 G/DL (ref 3.5–5.2)
ALBUMIN/GLOB SERPL: 1.2 G/DL
ALP SERPL-CCNC: 95 U/L (ref 39–117)
ALT SERPL W P-5'-P-CCNC: 17 U/L (ref 1–41)
ANION GAP SERPL CALCULATED.3IONS-SCNC: 10.4 MMOL/L (ref 5–15)
AST SERPL-CCNC: 14 U/L (ref 1–40)
BASOPHILS # BLD AUTO: 0.03 10*3/MM3 (ref 0–0.2)
BASOPHILS NFR BLD AUTO: 0.2 % (ref 0–1.5)
BILIRUB SERPL-MCNC: 0.3 MG/DL (ref 0–1.2)
BUN SERPL-MCNC: 14 MG/DL (ref 8–23)
BUN/CREAT SERPL: 14 (ref 7–25)
CALCIUM SPEC-SCNC: 9.1 MG/DL (ref 8.6–10.5)
CHLORIDE SERPL-SCNC: 103 MMOL/L (ref 98–107)
CO2 SERPL-SCNC: 25.6 MMOL/L (ref 22–29)
CREAT SERPL-MCNC: 1 MG/DL (ref 0.76–1.27)
DEPRECATED RDW RBC AUTO: 46.8 FL (ref 37–54)
EGFRCR SERPLBLD CKD-EPI 2021: 74.2 ML/MIN/1.73
EOSINOPHIL # BLD AUTO: 0.31 10*3/MM3 (ref 0–0.4)
EOSINOPHIL NFR BLD AUTO: 2 % (ref 0.3–6.2)
ERYTHROCYTE [DISTWIDTH] IN BLOOD BY AUTOMATED COUNT: 14 % (ref 12.3–15.4)
FLUAV RNA RESP QL NAA+PROBE: NOT DETECTED
FLUBV RNA RESP QL NAA+PROBE: NOT DETECTED
GLOBULIN UR ELPH-MCNC: 3.1 GM/DL
GLUCOSE SERPL-MCNC: 98 MG/DL (ref 65–99)
HCT VFR BLD AUTO: 46.4 % (ref 37.5–51)
HGB BLD-MCNC: 15.6 G/DL (ref 13–17.7)
HOLD SPECIMEN: NORMAL
HOLD SPECIMEN: NORMAL
IMM GRANULOCYTES # BLD AUTO: 0.1 10*3/MM3 (ref 0–0.05)
IMM GRANULOCYTES NFR BLD AUTO: 0.7 % (ref 0–0.5)
LYMPHOCYTES # BLD AUTO: 4.29 10*3/MM3 (ref 0.7–3.1)
LYMPHOCYTES NFR BLD AUTO: 27.9 % (ref 19.6–45.3)
MCH RBC QN AUTO: 30.5 PG (ref 26.6–33)
MCHC RBC AUTO-ENTMCNC: 33.6 G/DL (ref 31.5–35.7)
MCV RBC AUTO: 90.6 FL (ref 79–97)
MONOCYTES # BLD AUTO: 1 10*3/MM3 (ref 0.1–0.9)
MONOCYTES NFR BLD AUTO: 6.5 % (ref 5–12)
NEUTROPHILS NFR BLD AUTO: 62.7 % (ref 42.7–76)
NEUTROPHILS NFR BLD AUTO: 9.62 10*3/MM3 (ref 1.7–7)
NRBC BLD AUTO-RTO: 0 /100 WBC (ref 0–0.2)
NT-PROBNP SERPL-MCNC: 113.6 PG/ML (ref 0–1800)
PLATELET # BLD AUTO: 224 10*3/MM3 (ref 140–450)
PMV BLD AUTO: 10 FL (ref 6–12)
POTASSIUM SERPL-SCNC: 3.7 MMOL/L (ref 3.5–5.2)
PROT SERPL-MCNC: 6.9 G/DL (ref 6–8.5)
QT INTERVAL: 376 MS
QTC INTERVAL: 402 MS
RBC # BLD AUTO: 5.12 10*6/MM3 (ref 4.14–5.8)
SARS-COV-2 RNA RESP QL NAA+PROBE: NOT DETECTED
SODIUM SERPL-SCNC: 139 MMOL/L (ref 136–145)
TROPONIN T SERPL-MCNC: <0.01 NG/ML (ref 0–0.03)
TROPONIN T SERPL-MCNC: <0.01 NG/ML (ref 0–0.03)
WBC NRBC COR # BLD: 15.35 10*3/MM3 (ref 3.4–10.8)
WHOLE BLOOD HOLD COAG: NORMAL
WHOLE BLOOD HOLD SPECIMEN: NORMAL

## 2022-12-30 PROCEDURE — 80053 COMPREHEN METABOLIC PANEL: CPT | Performed by: NURSE PRACTITIONER

## 2022-12-30 PROCEDURE — 99284 EMERGENCY DEPT VISIT MOD MDM: CPT

## 2022-12-30 PROCEDURE — 36415 COLL VENOUS BLD VENIPUNCTURE: CPT

## 2022-12-30 PROCEDURE — 85025 COMPLETE CBC W/AUTO DIFF WBC: CPT | Performed by: NURSE PRACTITIONER

## 2022-12-30 PROCEDURE — 71045 X-RAY EXAM CHEST 1 VIEW: CPT

## 2022-12-30 PROCEDURE — 84484 ASSAY OF TROPONIN QUANT: CPT | Performed by: NURSE PRACTITIONER

## 2022-12-30 PROCEDURE — 71045 X-RAY EXAM CHEST 1 VIEW: CPT | Performed by: RADIOLOGY

## 2022-12-30 PROCEDURE — 83880 ASSAY OF NATRIURETIC PEPTIDE: CPT | Performed by: NURSE PRACTITIONER

## 2022-12-30 PROCEDURE — 93005 ELECTROCARDIOGRAM TRACING: CPT | Performed by: STUDENT IN AN ORGANIZED HEALTH CARE EDUCATION/TRAINING PROGRAM

## 2022-12-30 PROCEDURE — 87636 SARSCOV2 & INF A&B AMP PRB: CPT | Performed by: NURSE PRACTITIONER

## 2022-12-30 RX ORDER — ASPIRIN 81 MG/1
TABLET, CHEWABLE ORAL
Status: DISCONTINUED
Start: 2022-12-30 | End: 2022-12-30 | Stop reason: HOSPADM

## 2022-12-30 RX ORDER — SODIUM CHLORIDE 0.9 % (FLUSH) 0.9 %
10 SYRINGE (ML) INJECTION AS NEEDED
Status: DISCONTINUED | OUTPATIENT
Start: 2022-12-30 | End: 2022-12-30 | Stop reason: HOSPADM

## 2022-12-30 RX ORDER — ASPIRIN 81 MG/1
324 TABLET, CHEWABLE ORAL ONCE
Status: COMPLETED | OUTPATIENT
Start: 2022-12-30 | End: 2022-12-30

## 2022-12-30 RX ADMIN — ASPIRIN 324 MG: 81 TABLET, CHEWABLE ORAL at 10:36

## 2022-12-30 NOTE — DISCHARGE INSTRUCTIONS
Follow up with Dr. Payan as soon as possible    Return to the emergency room for worsening symptoms.

## 2022-12-30 NOTE — ED PROVIDER NOTES
Subjective     Chest Pain  Pain location:  Substernal area  Pain quality: aching    Pain radiates to:  Does not radiate  Onset quality:  Sudden  Duration:  1 day  Timing:  Constant  Progression:  Worsening  Chronicity:  New  Context: not breathing, not eating, not lifting, not at rest and not trauma    Relieved by:  Nothing  Worsened by:  Nothing  Ineffective treatments:  None tried  Associated symptoms: shortness of breath    Associated symptoms: no abdominal pain, no back pain and no fatigue    Risk factors: coronary artery disease        Review of Systems   Constitutional: Negative.  Negative for fatigue.   HENT: Negative.    Eyes: Negative.    Respiratory: Positive for shortness of breath.    Cardiovascular: Positive for chest pain.   Gastrointestinal: Negative.  Negative for abdominal pain.   Endocrine: Negative.    Genitourinary: Negative.    Musculoskeletal: Negative.  Negative for back pain.   Skin: Negative.    Allergic/Immunologic: Negative.    Neurological: Negative.    Hematological: Negative.    Psychiatric/Behavioral: Negative.        Past Medical History:   Diagnosis Date   • Allergic rhinitis    • Asthma    • Cancer (HCC)     basal cell skin cancer   • Chronic diastolic CHF (congestive heart failure) (HCC)    • Disease of thyroid gland    • Dysrhythmia    • GERD (gastroesophageal reflux disease)    • Heart murmur     as infant   • History of ventricular tachycardia    • Hyperlipidemia    • Hypertension    • Near syncope        Allergies   Allergen Reactions   • Sulfa Antibiotics Myalgia     States lowers blood sugar   • Diprivan [Propofol] Angioedema   • Statins Mental Status Change and Myalgia   • Shellfish-Derived Products Hives and Itching       Past Surgical History:   Procedure Laterality Date   • TONSILLECTOMY         Family History   Problem Relation Age of Onset   • Heart disease Brother        Social History     Socioeconomic History   • Marital status:    Tobacco Use   • Smoking  status: Former     Packs/day: 1.00     Types: Cigarettes     Quit date:      Years since quittin.0   • Smokeless tobacco: Never   Substance and Sexual Activity   • Alcohol use: No   • Drug use: No   • Sexual activity: Defer           Objective   Physical Exam  Vitals and nursing note reviewed.   Constitutional:       Appearance: He is well-developed.   HENT:      Head: Normocephalic.      Right Ear: External ear normal.      Left Ear: External ear normal.   Eyes:      Conjunctiva/sclera: Conjunctivae normal.      Pupils: Pupils are equal, round, and reactive to light.   Cardiovascular:      Rate and Rhythm: Normal rate and regular rhythm.      Heart sounds: Normal heart sounds.   Pulmonary:      Effort: Pulmonary effort is normal.      Breath sounds: Normal breath sounds.   Abdominal:      General: Bowel sounds are normal.      Palpations: Abdomen is soft.   Musculoskeletal:         General: Normal range of motion.      Cervical back: Normal range of motion and neck supple.   Skin:     General: Skin is warm and dry.      Capillary Refill: Capillary refill takes less than 2 seconds.   Neurological:      Mental Status: He is alert and oriented to person, place, and time.   Psychiatric:         Behavior: Behavior normal.         Thought Content: Thought content normal.         Procedures           ED Course  ED Course as of 23 1439   Fri Dec 30, 2022   0939 EKG notes sinus rhythm.  69 bpm.  I directly evaluated the EKG of this patient and noted no acute abnormalities.    Electronically signed by Roc Hancock DO, 22, 9:39 AM EST.   [SF]   1408 Heart score 4    [MARIO]   1408 Discussed with Dr. Pryor. Advises that based on patient presentation patient could follow up with cardiology and be given order for outpatient stress test. Discussed with patient who is in agreeance with this plan. [MARIO]      ED Course User Index  [MARIO] Rudolph Hernández, APRN  [SF] Roc Hancock DO                                            MDM    Final diagnoses:   Atypical chest pain       ED Disposition  ED Disposition     ED Disposition   Discharge    Condition   Stable    Comment   --             Jae Payan MD  [address] [place] [address] [phone]    Schedule an appointment as soon as possible for a visit in 2 days  For further evaluation         Medication List      No changes were made to your prescriptions during this visit.          Rudolph Hernández, APRN  01/02/23 [id number]

## 2023-01-03 ENCOUNTER — HOSPITAL ENCOUNTER (OUTPATIENT)
Dept: NUCLEAR MEDICINE | Facility: HOSPITAL | Age: 83
Discharge: HOME | End: 2023-01-03
Payer: MEDICARE

## 2023-01-03 ENCOUNTER — HOSPITAL ENCOUNTER (OUTPATIENT)
Dept: CARDIOLOGY | Facility: HOSPITAL | Age: 83
Discharge: HOME | End: 2023-01-03
Payer: MEDICARE

## 2023-01-03 DIAGNOSIS — R07.9 CHEST PAIN, UNSPECIFIED TYPE: ICD-10-CM

## 2023-01-03 LAB
BH CV NUCLEAR PRIOR STUDY: 3
BH CV REST NUCLEAR ISOTOPE DOSE: 9.8 MCI
BH CV STRESS BP STAGE 1: NORMAL
BH CV STRESS COMMENTS STAGE 1: NORMAL
BH CV STRESS DOSE REGADENOSON STAGE 1: 0.4
BH CV STRESS DURATION MIN STAGE 1: 0
BH CV STRESS DURATION SEC STAGE 1: 10
BH CV STRESS HR STAGE 1: 101
BH CV STRESS NUCLEAR ISOTOPE DOSE: 30.2 MCI
BH CV STRESS PROTOCOL 1: NORMAL
BH CV STRESS RECOVERY BP: NORMAL MMHG
BH CV STRESS RECOVERY HR: 94 BPM
BH CV STRESS STAGE 1: 1
LV EF NUC BP: 61 %
MAXIMAL PREDICTED HEART RATE: 136 BPM
PERCENT MAX PREDICTED HR: 74.26 %
STRESS BASELINE BP: NORMAL MMHG
STRESS BASELINE HR: 75 BPM
STRESS PERCENT HR: 87 %
STRESS POST PEAK BP: NORMAL MMHG
STRESS POST PEAK HR: 101 BPM
STRESS TARGET HR: 116 BPM

## 2023-01-03 PROCEDURE — 25010000002 REGADENOSON 0.4 MG/5ML SOLUTION: Performed by: STUDENT IN AN ORGANIZED HEALTH CARE EDUCATION/TRAINING PROGRAM

## 2023-01-03 PROCEDURE — 93017 CV STRESS TEST TRACING ONLY: CPT

## 2023-01-03 PROCEDURE — 78452 HT MUSCLE IMAGE SPECT MULT: CPT | Performed by: INTERNAL MEDICINE

## 2023-01-03 PROCEDURE — 93018 CV STRESS TEST I&R ONLY: CPT | Performed by: INTERNAL MEDICINE

## 2023-01-03 PROCEDURE — 0 TECHNETIUM SESTAMIBI: Performed by: STUDENT IN AN ORGANIZED HEALTH CARE EDUCATION/TRAINING PROGRAM

## 2023-01-03 PROCEDURE — A9500 TC99M SESTAMIBI: HCPCS | Performed by: STUDENT IN AN ORGANIZED HEALTH CARE EDUCATION/TRAINING PROGRAM

## 2023-01-03 PROCEDURE — 78452 HT MUSCLE IMAGE SPECT MULT: CPT

## 2023-01-03 RX ADMIN — TECHNETIUM TC 99M SESTAMIBI 1 DOSE: 1 INJECTION INTRAVENOUS at 08:31

## 2023-01-03 RX ADMIN — TECHNETIUM TC 99M SESTAMIBI 1 DOSE: 1 INJECTION INTRAVENOUS at 09:48

## 2023-01-03 RX ADMIN — REGADENOSON 0.4 MG: 0.08 INJECTION, SOLUTION INTRAVENOUS at 09:48

## 2023-01-05 ENCOUNTER — OFFICE VISIT (OUTPATIENT)
Dept: CARDIOLOGY | Facility: CLINIC | Age: 83
End: 2023-01-05
Payer: MEDICARE

## 2023-01-05 VITALS
DIASTOLIC BLOOD PRESSURE: 77 MMHG | HEART RATE: 84 BPM | SYSTOLIC BLOOD PRESSURE: 151 MMHG | BODY MASS INDEX: 32.73 KG/M2 | HEIGHT: 69 IN | WEIGHT: 221 LBS | OXYGEN SATURATION: 96 %

## 2023-01-05 DIAGNOSIS — I10 ESSENTIAL HYPERTENSION: ICD-10-CM

## 2023-01-05 DIAGNOSIS — I47.29 NONSUSTAINED VENTRICULAR TACHYCARDIA: ICD-10-CM

## 2023-01-05 DIAGNOSIS — I47.1 NONSUSTAINED SUPRAVENTRICULAR TACHYCARDIA: Primary | ICD-10-CM

## 2023-01-05 DIAGNOSIS — R07.9 CHEST PAIN, UNSPECIFIED TYPE: ICD-10-CM

## 2023-01-05 DIAGNOSIS — R42 DIZZINESS: ICD-10-CM

## 2023-01-05 DIAGNOSIS — R00.2 PALPITATIONS: ICD-10-CM

## 2023-01-05 PROCEDURE — 99214 OFFICE O/P EST MOD 30 MIN: CPT | Performed by: NURSE PRACTITIONER

## 2023-01-05 RX ORDER — VALSARTAN 160 MG/1
160 TABLET ORAL DAILY
COMMUNITY

## 2023-01-05 NOTE — PROGRESS NOTES
Ángela Bonilla, APRN  Edward Rodas  1940 01/05/2023    Patient Active Problem List   Diagnosis   • Essential hypertension   • Palpitations   • Near syncope   • Nonsustained supraventricular tachycardia (HCC)   • Nonsustained ventricular tachycardia   • Abnormal nuclear stress test   • Pneumonia due to COVID-19 virus   • Acute hypoxemic respiratory failure due to COVID-19 (HCC)   • Cytokine release syndrome, grade 2   • Chronic heart failure with preserved ejection fraction (HCC)   • Pneumonia       Dear Ángela Bonilla, APRN:    Subjective     Chief Complaint   Patient presents with   • Med Management   • Chest tightness      Went to ER on 12/30/22.     • Edema   • Dizziness   • Fatigue   • Shortness of Breath   • Palpitations           History of Present Illness:    Edward Rodas is a 82 y.o. male with a past medical history of nonsustained ventricular tachycardia and paroxysmal SVT in the past.  He presents today for cardiology follow-up.  Since his last visit, was admitted to [facility] in November 2022 due to pneumonia.  He reports he has slowly been recovering from this.  He was experiencing some chest tightness on 12/30/2022 and went to the ED for further evaluation.  Troponin was negative x2.  EKG did not reveal any acute ischemic changes.  Ultimately, he underwent Lexiscan stress test which revealed no evidence of ischemia.  He also recently had an echocardiogram while inpatient which revealed normal LV function and no significant valvular abnormalities.  His blood pressure is elevated in the office today but he reports he monitors his diligently at home and since resuming his home valsartan after discharge from hospital his blood pressure has been well controlled.  He states shortly after he went to the hospital for chest tightness that he developed a cough and congestion.  His chest pains have completely resolved.  He does still have persistent dizziness which has been chronic and also  reports a sensation of hearing his heartbeat in his ears.  In addition, he has been having palpitations which feels like his heartbeat is irregular.  He has had 2 near syncopal episodes but denies any actual syncope.          Allergies   Allergen Reactions   • Sulfa Antibiotics Myalgia     States lowers blood sugar   • Diprivan [Propofol] Angioedema   • Statins Mental Status Change and Myalgia   • Shellfish-Derived Products Hives and Itching   :      Current Outpatient Medications:   •  acetaminophen (TYLENOL) 500 MG tablet, Take 500 mg by mouth Every 6 (Six) Hours As Needed for Mild Pain  or Fever., Disp: , Rfl:   •  albuterol sulfate  (90 Base) MCG/ACT inhaler, Inhale 2 puffs Every 4 (Four) Hours As Needed for Wheezing., Disp: 8.5 g, Rfl: 0  •  amLODIPine (NORVASC) 10 MG tablet, Take 5 mg by mouth Every Night., Disp: , Rfl:   •  budesonide-formoterol (SYMBICORT) 160-4.5 MCG/ACT inhaler, Inhale 2 puffs 2 (Two) Times a Day., Disp: , Rfl:   •  cholecalciferol (VITAMIN D3) 25 MCG (1000 UT) tablet, Take 1,000 Units by mouth Daily., Disp: , Rfl:   •  levothyroxine (SYNTHROID, LEVOTHROID) 112 MCG tablet, Take 112 mcg by mouth Daily., Disp: , Rfl:   •  metoprolol succinate XL (TOPROL-XL) 50 MG 24 hr tablet, Take 1 tablet by mouth Daily., Disp: 90 tablet, Rfl: 2  •  valsartan (DIOVAN) 160 MG tablet, Take 160 mg by mouth Daily., Disp: , Rfl:       The following portions of the patient's history were reviewed and updated as appropriate: allergies, current medications, past family history, past medical history, past social history, past surgical history and problem list.    Social History     Tobacco Use   • Smoking status: Former     Packs/day: 1.00     Types: Cigarettes     Quit date:      Years since quittin.0   • Smokeless tobacco: Never   Substance Use Topics   • Alcohol use: No   • Drug use: No       Review of Systems   Constitutional: Positive for malaise/fatigue. Negative for decreased appetite.    Cardiovascular: Positive for palpitations. Negative for chest pain and dyspnea on exertion.   Respiratory: Negative for cough and shortness of breath.    Neurological: Positive for dizziness.       Objective   Vitals:    01/05/23 1041   BP: 151/77   BP Location: Right arm   Patient Position: Sitting   Cuff Size: Adult   Pulse: 84   SpO2: 96%   Weight: 100 kg (221 lb)   Height: 175.3 cm (69\")     Body mass index is 32.64 kg/m².        Vitals reviewed.   Constitutional:       Appearance: Healthy appearance. Well-developed and not in distress.   HENT:      Head: Normocephalic and atraumatic.   Pulmonary:      Effort: Pulmonary effort is normal.      Breath sounds: Normal breath sounds. No wheezing. No rales.   Cardiovascular:      Normal rate. Regular rhythm.      Murmurs: There is no murmur.      . No S3 and S4 gallop.   Edema:     Peripheral edema absent.   Abdominal:      General: Bowel sounds are normal.      Palpations: Abdomen is soft.   Skin:     General: Skin is warm and dry.   Neurological:      Mental Status: Alert, oriented to person, place, and time and oriented to person, place and time.   Psychiatric:         Mood and Affect: Mood normal.         Behavior: Behavior normal.         Lab Results   Component Value Date     12/30/2022    K 3.7 12/30/2022     12/30/2022    CO2 25.6 12/30/2022    BUN 14 12/30/2022    CREATININE 1.00 12/30/2022    GLUCOSE 98 12/30/2022    CALCIUM 9.1 12/30/2022    AST 14 12/30/2022    ALT 17 12/30/2022    ALKPHOS 95 12/30/2022     No results found for: CKTOTAL  Lab Results   Component Value Date    WBC 15.35 (H) 12/30/2022    HGB 15.6 12/30/2022    HCT 46.4 12/30/2022     12/30/2022     Lab Results   Component Value Date    INR 1.03 08/03/2020    INR 0.90 02/23/2020    INR 0.94 07/20/2019     Lab Results   Component Value Date    MG 2.1 11/24/2022     Lab Results   Component Value Date    TSH 1.410 11/24/2022    TRIG 112 08/08/2018    HDL 34 (L) 08/08/2018      (H) 08/08/2018      No results found for: BNP        Procedures      Assessment & Plan    Diagnosis Plan   1. Nonsustained supraventricular tachycardia, clinically asymptomatic and stable.        2. Nonsustained ventricular tachycardia, clinically asymptomatic and stable.        3. Chest pain, unspecified type        4. Essential hypertension        5. Palpitations  Holter Monitor - 72 Hour Up To 15 Days      6. Dizziness  US Carotid Bilateral                   Recommendations:    1. She has a history of arrhythmia with recent palpitations and near syncope, will evaluate further with a 14-day Holter monitor  2. For his dizziness will evaluate further with a carotid Doppler.  3. He will continue with the amlodipine, metoprolol, and valsartan.  He will monitor his blood pressure at home.  4. Since his chest pains have resolved, will continue to monitor for now.  5. Follow-up in 6 months or sooner if needed.        Return in about 6 weeks (around 2/16/2023) for Recheck.    As always, I appreciate very much the opportunity to participate in the cardiovascular care of your patients.      With Best Regards,    LORENZO Moses

## 2023-01-05 NOTE — LETTER
January 5, 2023     LORENZO Wade  [address] [facility] [place] [address]    Patient: Edward Rodas   YOB: 1940   Date of Visit: 1/5/2023       Dear LORENZO May:    Thank you for referring Edward Rodas to me for evaluation. Below are the relevant portions of my assessment and plan of care.    If you have questions, please do not hesitate to call me. I look forward to following Edward along with you.         Sincerely,        LORENZO Moses        CC: No Recipients  Louann Pardo APRN  01/05/23 1134  Signed  Ángela Bonilla APRN  Edward Rodas  1940 01/05/2023    Patient Active Problem List   Diagnosis   • Essential hypertension   • Palpitations   • Near syncope   • Nonsustained supraventricular tachycardia (HCC)   • Nonsustained ventricular tachycardia   • Abnormal nuclear stress test   • Pneumonia due to COVID-19 virus   • Acute hypoxemic respiratory failure due to COVID-19 (HCC)   • Cytokine release syndrome, grade 2   • Chronic heart failure with preserved ejection fraction (HCC)   • Pneumonia       Dear Ángela Bonilla, APRN:    Subjective      Chief Complaint   Patient presents with   • Med Management   • Chest tightness      Went to ER on 12/30/22.     • Edema   • Dizziness   • Fatigue   • Shortness of Breath   • Palpitations           History of Present Illness:    Edward Rodas is a 82 y.o. male with a past medical history of nonsustained ventricular tachycardia and paroxysmal SVT in the past.  He presents today for cardiology follow-up.  Since his last visit, was admitted to [facility] in November 2022 due to pneumonia.  He reports he has slowly been recovering from this.  He was experiencing some chest tightness on 12/30/2022 and went to the ED for further evaluation.  Troponin was negative x2.  EKG did not reveal any acute ischemic changes.  Ultimately, he underwent Lexiscan stress test which revealed no evidence of ischemia.  He also recently had an  echocardiogram while inpatient which revealed normal LV function and no significant valvular abnormalities.  His blood pressure is elevated in the office today but he reports he monitors his diligently at home and since resuming his home valsartan after discharge from hospital his blood pressure has been well controlled.  He states shortly after he went to the hospital for chest tightness that he developed a cough and congestion.  His chest pains have completely resolved.  He does still have persistent dizziness which has been chronic and also reports a sensation of hearing his heartbeat in his ears.  In addition, he has been having palpitations which feels like his heartbeat is irregular.  He has had 2 near syncopal episodes but denies any actual syncope.          Allergies   Allergen Reactions   • Sulfa Antibiotics Myalgia     States lowers blood sugar   • Diprivan [Propofol] Angioedema   • Statins Mental Status Change and Myalgia   • Shellfish-Derived Products Hives and Itching   :      Current Outpatient Medications:   •  acetaminophen (TYLENOL) 500 MG tablet, Take 500 mg by mouth Every 6 (Six) Hours As Needed for Mild Pain  or Fever., Disp: , Rfl:   •  albuterol sulfate  (90 Base) MCG/ACT inhaler, Inhale 2 puffs Every 4 (Four) Hours As Needed for Wheezing., Disp: 8.5 g, Rfl: 0  •  amLODIPine (NORVASC) 10 MG tablet, Take 5 mg by mouth Every Night., Disp: , Rfl:   •  budesonide-formoterol (SYMBICORT) 160-4.5 MCG/ACT inhaler, Inhale 2 puffs 2 (Two) Times a Day., Disp: , Rfl:   •  cholecalciferol (VITAMIN D3) 25 MCG (1000 UT) tablet, Take 1,000 Units by mouth Daily., Disp: , Rfl:   •  levothyroxine (SYNTHROID, LEVOTHROID) 112 MCG tablet, Take 112 mcg by mouth Daily., Disp: , Rfl:   •  metoprolol succinate XL (TOPROL-XL) 50 MG 24 hr tablet, Take 1 tablet by mouth Daily., Disp: 90 tablet, Rfl: 2  •  valsartan (DIOVAN) 160 MG tablet, Take 160 mg by mouth Daily., Disp: , Rfl:       The following portions of the  patient's history were reviewed and updated as appropriate: allergies, current medications, past family history, past medical history, past social history, past surgical history and problem list.    Social History     Tobacco Use   • Smoking status: Former     Packs/day: 1.00     Types: Cigarettes     Quit date:      Years since quittin.0   • Smokeless tobacco: Never   Substance Use Topics   • Alcohol use: No   • Drug use: No       Review of Systems   Constitutional: Positive for malaise/fatigue. Negative for decreased appetite.   Cardiovascular: Positive for palpitations. Negative for chest pain and dyspnea on exertion.   Respiratory: Negative for cough and shortness of breath.    Neurological: Positive for dizziness.       Objective    Vitals:    23 1041   BP: 151/77   BP Location: Right arm   Patient Position: Sitting   Cuff Size: Adult   Pulse: 84   SpO2: 96%   Weight: 100 kg (221 lb)   Height: 175.3 cm (69\")     Body mass index is 32.64 kg/m².        Vitals reviewed.   Constitutional:       Appearance: Healthy appearance. Well-developed and not in distress.   HENT:      Head: Normocephalic and atraumatic.   Pulmonary:      Effort: Pulmonary effort is normal.      Breath sounds: Normal breath sounds. No wheezing. No rales.   Cardiovascular:      Normal rate. Regular rhythm.      Murmurs: There is no murmur.      . No S3 and S4 gallop.   Edema:     Peripheral edema absent.   Abdominal:      General: Bowel sounds are normal.      Palpations: Abdomen is soft.   Skin:     General: Skin is warm and dry.   Neurological:      Mental Status: Alert, oriented to person, place, and time and oriented to person, place and time.   Psychiatric:         Mood and Affect: Mood normal.         Behavior: Behavior normal.         Lab Results   Component Value Date     2022    K 3.7 2022     2022    CO2 25.6 2022    BUN 14 2022    CREATININE 1.00 2022    GLUCOSE 98  12/30/2022    CALCIUM 9.1 12/30/2022    AST 14 12/30/2022    ALT 17 12/30/2022    ALKPHOS 95 12/30/2022     No results found for: CKTOTAL  Lab Results   Component Value Date    WBC 15.35 (H) 12/30/2022    HGB 15.6 12/30/2022    HCT 46.4 12/30/2022     12/30/2022     Lab Results   Component Value Date    INR 1.03 08/03/2020    INR 0.90 02/23/2020    INR 0.94 07/20/2019     Lab Results   Component Value Date    MG 2.1 11/24/2022     Lab Results   Component Value Date    TSH 1.410 11/24/2022    TRIG 112 08/08/2018    HDL 34 (L) 08/08/2018     (H) 08/08/2018      No results found for: BNP        Procedures      Assessment & Plan    Diagnosis Plan   1. Nonsustained supraventricular tachycardia, clinically asymptomatic and stable.        2. Nonsustained ventricular tachycardia, clinically asymptomatic and stable.        3. Chest pain, unspecified type        4. Essential hypertension        5. Palpitations  Holter Monitor - 72 Hour Up To 15 Days      6. Dizziness  US Carotid Bilateral                  Recommendations:    1. She has a history of arrhythmia with recent palpitations and near syncope, will evaluate further with a 14-day Holter monitor  2. For his dizziness will evaluate further with a carotid Doppler.  3. He will continue with the amlodipine, metoprolol, and valsartan.  He will monitor his blood pressure at home.  4. Since his chest pains have resolved, will continue to monitor for now.  5. Follow-up in 6 months or sooner if needed.        Return in about 6 weeks (around 2/16/2023) for Recheck.    As always, I appreciate very much the opportunity to participate in the cardiovascular care of your patients.      With Best Regards,    LORENZO Moses

## 2023-01-13 ENCOUNTER — HOSPITAL ENCOUNTER (OUTPATIENT)
Dept: CARDIOLOGY | Facility: HOSPITAL | Age: 83
Discharge: HOME | End: 2023-01-13
Admitting: NURSE PRACTITIONER
Payer: MEDICARE

## 2023-01-13 DIAGNOSIS — R42 DIZZINESS: ICD-10-CM

## 2023-01-13 PROCEDURE — 93880 EXTRACRANIAL BILAT STUDY: CPT

## 2023-01-13 PROCEDURE — 93880 EXTRACRANIAL BILAT STUDY: CPT | Performed by: RADIOLOGY

## 2023-02-13 ENCOUNTER — TREATMENT (OUTPATIENT)
Dept: CARDIOLOGY | Facility: CLINIC | Age: 83
End: 2023-02-13
Payer: MEDICARE

## 2023-02-13 DIAGNOSIS — R00.2 PALPITATIONS: ICD-10-CM

## 2023-02-13 PROCEDURE — 93248 EXT ECG>7D<15D REV&INTERPJ: CPT | Performed by: INTERNAL MEDICINE

## 2023-02-13 PROCEDURE — 93246 EXT ECG>7D<15D RECORDING: CPT | Performed by: INTERNAL MEDICINE

## 2023-02-16 ENCOUNTER — OFFICE VISIT (OUTPATIENT)
Dept: CARDIOLOGY | Facility: CLINIC | Age: 83
End: 2023-02-16
Payer: MEDICARE

## 2023-02-16 VITALS
HEIGHT: 69 IN | OXYGEN SATURATION: 96 % | DIASTOLIC BLOOD PRESSURE: 70 MMHG | BODY MASS INDEX: 33.92 KG/M2 | HEART RATE: 80 BPM | WEIGHT: 229 LBS | SYSTOLIC BLOOD PRESSURE: 148 MMHG

## 2023-02-16 DIAGNOSIS — I47.1 NONSUSTAINED SUPRAVENTRICULAR TACHYCARDIA: ICD-10-CM

## 2023-02-16 DIAGNOSIS — I10 ESSENTIAL HYPERTENSION: ICD-10-CM

## 2023-02-16 DIAGNOSIS — I47.29 NONSUSTAINED VENTRICULAR TACHYCARDIA: Primary | ICD-10-CM

## 2023-02-16 DIAGNOSIS — R42 DIZZINESS: ICD-10-CM

## 2023-02-16 PROCEDURE — 99214 OFFICE O/P EST MOD 30 MIN: CPT | Performed by: NURSE PRACTITIONER

## 2023-02-16 RX ORDER — METOPROLOL SUCCINATE 25 MG/1
25 TABLET, EXTENDED RELEASE ORAL DAILY
Qty: 30 TABLET | Refills: 11 | Status: SHIPPED | OUTPATIENT
Start: 2023-02-16

## 2023-02-16 NOTE — PROGRESS NOTES
Ángela Bonilla, APRN  Edward Rodas  1940 02/16/2023    Patient Active Problem List   Diagnosis   • Essential hypertension   • Palpitations   • Near syncope   • Nonsustained supraventricular tachycardia (HCC)   • Nonsustained ventricular tachycardia   • Abnormal nuclear stress test   • Pneumonia due to COVID-19 virus   • Acute hypoxemic respiratory failure due to COVID-19 (HCC)   • Cytokine release syndrome, grade 2   • Chronic heart failure with preserved ejection fraction (HCC)   • Pneumonia       Dear Ángela Bonilla, APRN:    Subjective     Chief Complaint   Patient presents with   • Med Management   • Shortness of Breath   • Edema   • Dizziness           History of Present Illness:    Edward Rodas is a 83 y.o. male with a past medical history of nonsustained ventricular tachycardia and paroxysmal SVT in the past.  He presents today for cardiology follow-up.  Previously, was having some dizziness and lightheadedness.  He was evaluated further with a 14-day Holter monitor.  This revealed predominantly sinus rhythm with heart rate ranging from 49 to 120 bpm.  He had occasional PACs with short 5-7 beat runs of SVT.  There were also occasional PVCs with 3-4 beat runs of V. tach.  He denies any palpitations.  Denies chest pains.  Has chronic dyspnea unchanged from baseline.  He recently had a stress test which was negative for evidence of myocardial ischemia.  He also recently had an echocardiogram which revealed normal LV function.  He reports PCP has obtained labs.  He previously was on metoprolol succinate 75 mg daily but this was lowered to 50 mg daily because he did feel fatigued.  However, he now believes that this was multifactorial.  He has chronic dizziness for which she is scheduled to see a neurologist.  Recently had an eye exam.  Also had a normal carotid Doppler.          Allergies   Allergen Reactions   • Sulfa Antibiotics Myalgia     States lowers blood sugar   • Diprivan [Propofol]  Angioedema   • Statins Mental Status Change and Myalgia   • Shellfish-Derived Products Hives and Itching   :      Current Outpatient Medications:   •  acetaminophen (TYLENOL) 500 MG tablet, Take 500 mg by mouth Every 6 (Six) Hours As Needed for Mild Pain  or Fever., Disp: , Rfl:   •  albuterol sulfate  (90 Base) MCG/ACT inhaler, Inhale 2 puffs Every 4 (Four) Hours As Needed for Wheezing., Disp: 8.5 g, Rfl: 0  •  amLODIPine (NORVASC) 10 MG tablet, Take 5 mg by mouth Every Night., Disp: , Rfl:   •  budesonide-formoterol (SYMBICORT) 160-4.5 MCG/ACT inhaler, Inhale 2 puffs 2 (Two) Times a Day., Disp: , Rfl:   •  cholecalciferol (VITAMIN D3) 25 MCG (1000 UT) tablet, Take 1,000 Units by mouth Daily., Disp: , Rfl:   •  levothyroxine (SYNTHROID, LEVOTHROID) 112 MCG tablet, Take 112 mcg by mouth Daily., Disp: , Rfl:   •  metoprolol succinate XL (TOPROL-XL) 50 MG 24 hr tablet, Take 1 tablet by mouth Daily., Disp: 90 tablet, Rfl: 2  •  valsartan (DIOVAN) 160 MG tablet, Take 160 mg by mouth Daily., Disp: , Rfl:   •  metoprolol succinate XL (TOPROL-XL) 25 MG 24 hr tablet, Take 1 tablet by mouth Daily. Take with metoprolol succinate 50 mg for a total of 75 mg daily., Disp: 30 tablet, Rfl: 11      The following portions of the patient's history were reviewed and updated as appropriate: allergies, current medications, past family history, past medical history, past social history, past surgical history and problem list.    Social History     Tobacco Use   • Smoking status: Former     Packs/day: 1.00     Types: Cigarettes     Quit date:      Years since quittin.1   • Smokeless tobacco: Never   Substance Use Topics   • Alcohol use: No   • Drug use: No       Review of Systems   Constitutional: Negative for decreased appetite and malaise/fatigue.   Cardiovascular: Positive for dyspnea on exertion. Negative for chest pain and palpitations.   Respiratory: Negative for cough and shortness of breath.        Objective  "  Vitals:    02/16/23 1246   BP: 148/70   BP Location: Right arm   Patient Position: Sitting   Cuff Size: Adult   Pulse: 80   SpO2: 96%   Weight: 104 kg (229 lb)   Height: 175.3 cm (69\")     Body mass index is 33.82 kg/m².        Vitals reviewed.   Constitutional:       Appearance: Healthy appearance. Well-developed and not in distress.   HENT:      Head: Normocephalic and atraumatic.   Pulmonary:      Effort: Pulmonary effort is normal.      Breath sounds: Normal breath sounds. No wheezing. No rales.   Cardiovascular:      Normal rate. Regular rhythm.      Murmurs: There is no murmur.      . No S3 and S4 gallop.   Edema:     Peripheral edema absent.   Abdominal:      General: Bowel sounds are normal.      Palpations: Abdomen is soft.   Skin:     General: Skin is warm and dry.   Neurological:      Mental Status: Alert, oriented to person, place, and time and oriented to person, place and time.   Psychiatric:         Mood and Affect: Mood normal.         Behavior: Behavior normal.         Lab Results   Component Value Date     12/30/2022    K 3.7 12/30/2022     12/30/2022    CO2 25.6 12/30/2022    BUN 14 12/30/2022    CREATININE 1.00 12/30/2022    GLUCOSE 98 12/30/2022    CALCIUM 9.1 12/30/2022    AST 14 12/30/2022    ALT 17 12/30/2022    ALKPHOS 95 12/30/2022     No results found for: CKTOTAL  Lab Results   Component Value Date    WBC 15.35 (H) 12/30/2022    HGB 15.6 12/30/2022    HCT 46.4 12/30/2022     12/30/2022     Lab Results   Component Value Date    INR 1.03 08/03/2020    INR 0.90 02/23/2020    INR 0.94 07/20/2019     Lab Results   Component Value Date    MG 2.1 11/24/2022     Lab Results   Component Value Date    TSH 1.410 11/24/2022    TRIG 112 08/08/2018    HDL 34 (L) 08/08/2018     (H) 08/08/2018      No results found for: BNP        Procedures      Assessment & Plan    Diagnosis Plan   1. Nonsustained ventricular tachycardia, clinically asymptomatic and stable.  metoprolol " succinate XL (TOPROL-XL) 25 MG 24 hr tablet      2. Nonsustained supraventricular tachycardia, clinically asymptomatic and stable.  metoprolol succinate XL (TOPROL-XL) 25 MG 24 hr tablet      3. Essential hypertension  metoprolol succinate XL (TOPROL-XL) 25 MG 24 hr tablet      4. Dizziness                     Recommendations:    1. Nonsustained ventricular tachycardia-short runs on monitor.  Patient has been asymptomatic.  EF normal.  Stress test negative for ischemia.  Will increase metoprolol succinate to 75 mg daily.  We will obtain labs from PCP and order magnesium level if this has not been done.  2. Supraventricular tachycardia-asymptomatic.  Will monitor for improvement with increase in metoprolol dosage.  3. Essential hypertension - continue amlodipine and valsartan.  4. Dizziness - carotid doppler normal. He will follow up with neurology.  5. Follow up in 3 months or sooner if needed.         Return in about 3 months (around 5/16/2023) for Recheck.    As always, I appreciate very much the opportunity to participate in the cardiovascular care of your patients.      With Best Regards,    LORENZO Moses

## 2023-02-17 ENCOUNTER — TELEPHONE (OUTPATIENT)
Dept: CARDIOLOGY | Facility: CLINIC | Age: 83
End: 2023-02-17
Payer: MEDICARE

## 2023-02-17 NOTE — TELEPHONE ENCOUNTER
----- Message from LORENZO Moses sent at 2/16/2023  1:05 PM EST -----  Please request labs from PCP. Thanks.      REQUESTED -BUT WERE DONE IN NOVEMBER

## 2023-04-30 ENCOUNTER — HOSPITAL ENCOUNTER (EMERGENCY)
Facility: HOSPITAL | Age: 83
Discharge: HOME | End: 2023-05-01
Attending: STUDENT IN AN ORGANIZED HEALTH CARE EDUCATION/TRAINING PROGRAM | Admitting: STUDENT IN AN ORGANIZED HEALTH CARE EDUCATION/TRAINING PROGRAM
Payer: MEDICARE

## 2023-04-30 ENCOUNTER — APPOINTMENT (OUTPATIENT)
Dept: GENERAL RADIOLOGY | Facility: HOSPITAL | Age: 83
End: 2023-04-30
Payer: MEDICARE

## 2023-04-30 DIAGNOSIS — R07.9 CHEST PAIN, UNSPECIFIED TYPE: Primary | ICD-10-CM

## 2023-04-30 LAB
ALBUMIN SERPL-MCNC: 3.8 G/DL (ref 3.5–5.2)
ALBUMIN/GLOB SERPL: 1.3 G/DL
ALP SERPL-CCNC: 90 U/L (ref 39–117)
ALT SERPL W P-5'-P-CCNC: 23 U/L (ref 1–41)
ANION GAP SERPL CALCULATED.3IONS-SCNC: 11.8 MMOL/L (ref 5–15)
AST SERPL-CCNC: 16 U/L (ref 1–40)
BASOPHILS # BLD AUTO: 0.02 10*3/MM3 (ref 0–0.2)
BASOPHILS NFR BLD AUTO: 0.2 % (ref 0–1.5)
BILIRUB SERPL-MCNC: 0.3 MG/DL (ref 0–1.2)
BUN SERPL-MCNC: 17 MG/DL (ref 8–23)
BUN/CREAT SERPL: 16 (ref 7–25)
CALCIUM SPEC-SCNC: 9 MG/DL (ref 8.6–10.5)
CHLORIDE SERPL-SCNC: 103 MMOL/L (ref 98–107)
CO2 SERPL-SCNC: 24.2 MMOL/L (ref 22–29)
CREAT SERPL-MCNC: 1.06 MG/DL (ref 0.76–1.27)
DEPRECATED RDW RBC AUTO: 47.8 FL (ref 37–54)
EGFRCR SERPLBLD CKD-EPI 2021: 68.8 ML/MIN/1.73
EOSINOPHIL # BLD AUTO: 0.25 10*3/MM3 (ref 0–0.4)
EOSINOPHIL NFR BLD AUTO: 1.9 % (ref 0.3–6.2)
ERYTHROCYTE [DISTWIDTH] IN BLOOD BY AUTOMATED COUNT: 14.2 % (ref 12.3–15.4)
FLUAV RNA RESP QL NAA+PROBE: NOT DETECTED
FLUBV RNA RESP QL NAA+PROBE: NOT DETECTED
GLOBULIN UR ELPH-MCNC: 2.9 GM/DL
GLUCOSE SERPL-MCNC: 182 MG/DL (ref 65–99)
HCT VFR BLD AUTO: 46.1 % (ref 37.5–51)
HGB BLD-MCNC: 15.6 G/DL (ref 13–17.7)
IMM GRANULOCYTES # BLD AUTO: 0.06 10*3/MM3 (ref 0–0.05)
IMM GRANULOCYTES NFR BLD AUTO: 0.5 % (ref 0–0.5)
LYMPHOCYTES # BLD AUTO: 3.14 10*3/MM3 (ref 0.7–3.1)
LYMPHOCYTES NFR BLD AUTO: 23.9 % (ref 19.6–45.3)
MCH RBC QN AUTO: 31.2 PG (ref 26.6–33)
MCHC RBC AUTO-ENTMCNC: 33.8 G/DL (ref 31.5–35.7)
MCV RBC AUTO: 92.2 FL (ref 79–97)
MONOCYTES # BLD AUTO: 1.03 10*3/MM3 (ref 0.1–0.9)
MONOCYTES NFR BLD AUTO: 7.8 % (ref 5–12)
NEUTROPHILS NFR BLD AUTO: 65.7 % (ref 42.7–76)
NEUTROPHILS NFR BLD AUTO: 8.63 10*3/MM3 (ref 1.7–7)
NRBC BLD AUTO-RTO: 0 /100 WBC (ref 0–0.2)
NT-PROBNP SERPL-MCNC: 119.4 PG/ML (ref 0–1800)
PLATELET # BLD AUTO: 187 10*3/MM3 (ref 140–450)
PMV BLD AUTO: 10 FL (ref 6–12)
POTASSIUM SERPL-SCNC: 3.7 MMOL/L (ref 3.5–5.2)
PROT SERPL-MCNC: 6.7 G/DL (ref 6–8.5)
RBC # BLD AUTO: 5 10*6/MM3 (ref 4.14–5.8)
SARS-COV-2 RNA RESP QL NAA+PROBE: NOT DETECTED
SODIUM SERPL-SCNC: 139 MMOL/L (ref 136–145)
TROPONIN T SERPL HS-MCNC: 16 NG/L
WBC NRBC COR # BLD: 13.13 10*3/MM3 (ref 3.4–10.8)

## 2023-04-30 PROCEDURE — 84484 ASSAY OF TROPONIN QUANT: CPT | Performed by: STUDENT IN AN ORGANIZED HEALTH CARE EDUCATION/TRAINING PROGRAM

## 2023-04-30 PROCEDURE — 85025 COMPLETE CBC W/AUTO DIFF WBC: CPT | Performed by: STUDENT IN AN ORGANIZED HEALTH CARE EDUCATION/TRAINING PROGRAM

## 2023-04-30 PROCEDURE — 93005 ELECTROCARDIOGRAM TRACING: CPT | Performed by: STUDENT IN AN ORGANIZED HEALTH CARE EDUCATION/TRAINING PROGRAM

## 2023-04-30 PROCEDURE — 80053 COMPREHEN METABOLIC PANEL: CPT | Performed by: STUDENT IN AN ORGANIZED HEALTH CARE EDUCATION/TRAINING PROGRAM

## 2023-04-30 PROCEDURE — 83880 ASSAY OF NATRIURETIC PEPTIDE: CPT | Performed by: STUDENT IN AN ORGANIZED HEALTH CARE EDUCATION/TRAINING PROGRAM

## 2023-04-30 PROCEDURE — 93010 ELECTROCARDIOGRAM REPORT: CPT | Performed by: INTERNAL MEDICINE

## 2023-04-30 PROCEDURE — 87636 SARSCOV2 & INF A&B AMP PRB: CPT | Performed by: STUDENT IN AN ORGANIZED HEALTH CARE EDUCATION/TRAINING PROGRAM

## 2023-04-30 PROCEDURE — 71045 X-RAY EXAM CHEST 1 VIEW: CPT | Performed by: RADIOLOGY

## 2023-04-30 PROCEDURE — 99284 EMERGENCY DEPT VISIT MOD MDM: CPT

## 2023-04-30 PROCEDURE — 36415 COLL VENOUS BLD VENIPUNCTURE: CPT

## 2023-04-30 PROCEDURE — 71045 X-RAY EXAM CHEST 1 VIEW: CPT

## 2023-05-01 VITALS
OXYGEN SATURATION: 98 % | DIASTOLIC BLOOD PRESSURE: 78 MMHG | SYSTOLIC BLOOD PRESSURE: 140 MMHG | BODY MASS INDEX: 34.07 KG/M2 | HEIGHT: 69 IN | TEMPERATURE: 98 F | WEIGHT: 230 LBS | HEART RATE: 80 BPM | RESPIRATION RATE: 16 BRPM

## 2023-05-01 LAB
GEN 5 2HR TROPONIN T REFLEX: 16 NG/L
GLUCOSE BLDC GLUCOMTR-MCNC: 149 MG/DL (ref 70–130)
QT INTERVAL: 378 MS
QTC INTERVAL: 446 MS
TROPONIN T DELTA: 0 NG/L

## 2023-05-01 PROCEDURE — 82948 REAGENT STRIP/BLOOD GLUCOSE: CPT

## 2023-05-01 PROCEDURE — 84484 ASSAY OF TROPONIN QUANT: CPT | Performed by: STUDENT IN AN ORGANIZED HEALTH CARE EDUCATION/TRAINING PROGRAM

## 2023-05-01 NOTE — ED PROVIDER NOTES
Subjective   History of Present Illness     Edward is an 84 yo presenting to the ED for dyspnea which started around 4am. Denies chest pain. Patient also reports increased non productive cough over the last day. Has taken albuterol at 7 with little relief in symptoms. He denies any fevers or other infectious symptoms. No sick contacts. No LE swelling/pain. No hx of blood clots. No blood thinners.    Review of Systems   Constitutional: Negative for activity change and fatigue.   HENT: Negative for congestion and sinus pressure.    Respiratory: Positive for shortness of breath. Negative for cough.    Cardiovascular: Negative for chest pain.   Gastrointestinal: Negative for abdominal distention and abdominal pain.   Genitourinary: Negative for difficulty urinating, dysuria and hematuria.   Musculoskeletal: Negative for arthralgias.   Skin: Negative for color change and rash.   Neurological: Negative for dizziness, weakness and light-headedness.   All other systems reviewed and are negative.      Past Medical History:   Diagnosis Date   • Allergic rhinitis    • Asthma    • Cancer     basal cell skin cancer   • Chronic diastolic CHF (congestive heart failure)    • Disease of thyroid gland    • Dysrhythmia    • GERD (gastroesophageal reflux disease)    • Heart murmur     as infant   • History of ventricular tachycardia    • Hyperlipidemia    • Hypertension    • Near syncope        Allergies   Allergen Reactions   • Sulfa Antibiotics Myalgia     States lowers blood sugar   • Diprivan [Propofol] Angioedema   • Statins Mental Status Change and Myalgia   • Shellfish-Derived Products Hives and Itching       Past Surgical History:   Procedure Laterality Date   • TONSILLECTOMY         Family History   Problem Relation Age of Onset   • Heart disease Brother        Social History     Socioeconomic History   • Marital status:    Tobacco Use   • Smoking status: Former     Packs/day: 1.00     Types: Cigarettes     Quit date:  1958     Years since quittin.3   • Smokeless tobacco: Never   Substance and Sexual Activity   • Alcohol use: No   • Drug use: No   • Sexual activity: Defer           Objective   Physical Exam  Constitutional:       General: He is not in acute distress.     Appearance: Normal appearance. He is not ill-appearing.   HENT:      Head: Normocephalic and atraumatic.      Nose: No congestion or rhinorrhea.   Eyes:      Extraocular Movements: Extraocular movements intact.      Pupils: Pupils are equal, round, and reactive to light.   Cardiovascular:      Rate and Rhythm: Normal rate and regular rhythm.   Pulmonary:      Effort: Pulmonary effort is normal.      Breath sounds: Normal breath sounds. No decreased breath sounds, wheezing, rhonchi or rales.   Abdominal:      General: Bowel sounds are normal.      Palpations: Abdomen is soft.   Musculoskeletal:         General: Normal range of motion.      Cervical back: Normal range of motion.   Skin:     General: Skin is warm.      Capillary Refill: Capillary refill takes less than 2 seconds.   Neurological:      General: No focal deficit present.      Mental Status: He is alert and oriented to person, place, and time.      Cranial Nerves: No cranial nerve deficit.      Sensory: No sensory deficit.      Motor: No weakness.      Deep Tendon Reflexes: Reflexes normal.         Procedures           ED Course  ED Course as of 23 0509   Sun 2023   2212 Normal sinus. HR 84. . . No STEMI. Electronically signed by Mala Barrientos MD, 23, 10:13 PM EDT.  [LS]      ED Course User Index  [LS] Mala Barrientos MD                                           Medical Decision Making  Edward is an 82 yo presenting to the ED for dyspnea, no chest pain. Patient is afebrile and hemodynamically stale on arrival. Non toxic appearing. Bedside ECG shows no acute signs of ischemia. Bedside CXR shows chronic changes, no new consolidations or evidence of  pneumonia. Basic labs, Trop, BNP, COVID/flu are obtained for further evaluation. Differentials to consider but not limited to include; HF, viral mediated illness, pneumonia, COPD exacerbation. Labs remarkable for delta of 0. Normal BNP. CXR non actionable. Heart score is moderate given age and risk factors. Low wells PE score. Given current clinical picture no further emergent workup warranted. Patient is dc in stable condition and informed to fu w/ his pcp in 2-3 days for symptoms. Discharged in stable condition.     Chest pain, unspecified type: acute illness or injury  Amount and/or Complexity of Data Reviewed  Labs: ordered.  Radiology: ordered.  ECG/medicine tests: ordered.          Final diagnoses:   Chest pain, unspecified type       ED Disposition  ED Disposition     ED Disposition   Discharge    Condition   Stable    Comment   --             Ángela Bonilla, APRN  [address] [facility] [address]  [phone]    Go in 2 days           Medication List      No changes were made to your prescriptions during this visit.          Mala Barrientos MD  05/02/23 [id number]

## 2023-05-25 ENCOUNTER — OFFICE VISIT (OUTPATIENT)
Dept: CARDIOLOGY | Facility: CLINIC | Age: 83
End: 2023-05-25
Payer: MEDICARE

## 2023-05-25 VITALS
BODY MASS INDEX: 35.7 KG/M2 | WEIGHT: 241 LBS | OXYGEN SATURATION: 96 % | SYSTOLIC BLOOD PRESSURE: 153 MMHG | HEART RATE: 83 BPM | HEIGHT: 69 IN | DIASTOLIC BLOOD PRESSURE: 73 MMHG

## 2023-05-25 DIAGNOSIS — I47.1 NONSUSTAINED SUPRAVENTRICULAR TACHYCARDIA: Primary | ICD-10-CM

## 2023-05-25 DIAGNOSIS — R60.0 BILATERAL LEG EDEMA: ICD-10-CM

## 2023-05-25 DIAGNOSIS — I10 ESSENTIAL HYPERTENSION: ICD-10-CM

## 2023-05-25 DIAGNOSIS — R42 DIZZINESS: ICD-10-CM

## 2023-05-25 DIAGNOSIS — F41.9 ANXIETY: ICD-10-CM

## 2023-05-25 PROCEDURE — 99214 OFFICE O/P EST MOD 30 MIN: CPT | Performed by: NURSE PRACTITIONER

## 2023-05-25 PROCEDURE — 3078F DIAST BP <80 MM HG: CPT | Performed by: NURSE PRACTITIONER

## 2023-05-25 PROCEDURE — 1160F RVW MEDS BY RX/DR IN RCRD: CPT | Performed by: NURSE PRACTITIONER

## 2023-05-25 PROCEDURE — 3077F SYST BP >= 140 MM HG: CPT | Performed by: NURSE PRACTITIONER

## 2023-05-25 PROCEDURE — 1159F MED LIST DOCD IN RCRD: CPT | Performed by: NURSE PRACTITIONER

## 2023-05-25 RX ORDER — FUROSEMIDE 20 MG/1
20 TABLET ORAL DAILY
Qty: 30 TABLET | Refills: 2 | Status: SHIPPED | OUTPATIENT
Start: 2023-05-25

## 2023-05-25 RX ORDER — POTASSIUM CHLORIDE 750 MG/1
10 TABLET, FILM COATED, EXTENDED RELEASE ORAL DAILY
Qty: 30 TABLET | Refills: 2 | Status: SHIPPED | OUTPATIENT
Start: 2023-05-25 | End: 2023-05-25 | Stop reason: SDUPTHER

## 2023-05-25 RX ORDER — POTASSIUM CHLORIDE 750 MG/1
10 TABLET, FILM COATED, EXTENDED RELEASE ORAL DAILY
Qty: 30 TABLET | Refills: 2 | Status: SHIPPED | OUTPATIENT
Start: 2023-05-25

## 2023-05-25 RX ORDER — FUROSEMIDE 20 MG/1
20 TABLET ORAL DAILY
Qty: 30 TABLET | Refills: 2 | Status: SHIPPED | OUTPATIENT
Start: 2023-05-25 | End: 2023-05-25 | Stop reason: SDUPTHER

## 2023-05-25 NOTE — PROGRESS NOTES
Ángela Bonilla, APRN  Edward Rodas  1940 05/25/2023    Patient Active Problem List   Diagnosis   • Essential hypertension   • Palpitations   • Near syncope   • Nonsustained supraventricular tachycardia   • Nonsustained ventricular tachycardia   • Abnormal nuclear stress test   • Pneumonia due to COVID-19 virus   • Acute hypoxemic respiratory failure due to COVID-19   • Cytokine release syndrome, grade 2   • Chronic heart failure with preserved ejection fraction   • Pneumonia       Dear Ángela Bonilla, APRN:    Subjective     Chief Complaint   Patient presents with   • Follow-up     ROUTINE   • Edema     BILATERAL FEET AND ANKLES           History of Present Illness:    Edward Rodas is a 83 y.o. male with a past medical history of nonsustained ventricular tachycardia and paroxysmal SVT in the past.  He presents today for cardiology follow-up. He was recently in the ED for cough and congestion. This has resolved since taking Augmentin. He denies any chest pains, shortness of breath, or palpitations. He has had some dizziness he relates to vertigo. Reports his PCP has suggested referral to neurology but he has not had time to make this appointment.  He denies any near-syncope or syncope.  Reports his main issue has been anxiety and insomnia. He has multiple stressors at home. He notes when he is very stressed with circumstances at home, his BP is elevated. Otherwise BP seems to be well controlled. He has had some leg edema and PCP prescribed HCTZ. He has been afraid to take the medication.          Allergies   Allergen Reactions   • Sulfa Antibiotics Myalgia     States lowers blood sugar   • Diprivan [Propofol] Angioedema   • Statins Mental Status Change and Myalgia   • Shellfish-Derived Products Hives and Itching   :      Current Outpatient Medications:   •  acetaminophen (TYLENOL) 500 MG tablet, Take 1 tablet by mouth Every 6 (Six) Hours As Needed for Mild Pain or Fever., Disp: , Rfl:   •  albuterol  "sulfate  (90 Base) MCG/ACT inhaler, Inhale 2 puffs Every 4 (Four) Hours As Needed for Wheezing., Disp: 8.5 g, Rfl: 0  •  amLODIPine (NORVASC) 10 MG tablet, Take 5 mg by mouth Every Night., Disp: , Rfl:   •  budesonide-formoterol (SYMBICORT) 160-4.5 MCG/ACT inhaler, Inhale 2 puffs 2 (Two) Times a Day., Disp: , Rfl:   •  cholecalciferol (VITAMIN D3) 25 MCG (1000 UT) tablet, Take 1 tablet by mouth Daily., Disp: , Rfl:   •  furosemide (LASIX) 20 MG tablet, Take 1 tablet by mouth Daily., Disp: 30 tablet, Rfl: 2  •  levothyroxine (SYNTHROID, LEVOTHROID) 112 MCG tablet, Take 1 tablet by mouth Daily., Disp: , Rfl:   •  metoprolol succinate XL (TOPROL-XL) 25 MG 24 hr tablet, Take 1 tablet by mouth Daily. Take with metoprolol succinate 50 mg for a total of 75 mg daily., Disp: 30 tablet, Rfl: 11  •  metoprolol succinate XL (TOPROL-XL) 50 MG 24 hr tablet, Take 1 tablet by mouth Daily., Disp: 90 tablet, Rfl: 2  •  potassium chloride 10 MEQ CR tablet, Take 1 tablet by mouth Daily., Disp: 30 tablet, Rfl: 2      The following portions of the patient's history were reviewed and updated as appropriate: allergies, current medications, past family history, past medical history, past social history, past surgical history and problem list.    Social History     Tobacco Use   • Smoking status: Former     Packs/day: 1.00     Types: Cigarettes     Quit date:      Years since quittin.4   • Smokeless tobacco: Never   Substance Use Topics   • Alcohol use: No   • Drug use: No       Review of Systems   Constitutional: Negative for decreased appetite and malaise/fatigue.   Cardiovascular: Negative for chest pain, dyspnea on exertion and palpitations.   Respiratory: Negative for cough and shortness of breath.    Neurological: Positive for dizziness.       Objective   Vitals:    23 1309   BP: 153/73   Pulse: 83   SpO2: 96%   Weight: 109 kg (241 lb)   Height: 175.3 cm (69.02\")     Body mass index is 35.57 kg/m².        Vitals " reviewed.   Constitutional:       Appearance: Healthy appearance. Well-developed and not in distress.   HENT:      Head: Normocephalic and atraumatic.   Pulmonary:      Effort: Pulmonary effort is normal.      Breath sounds: Normal breath sounds. No wheezing. No rales.   Cardiovascular:      Normal rate. Regular rhythm.      Murmurs: There is no murmur.      . No S3 and S4 gallop.   Abdominal:      General: Bowel sounds are normal.      Palpations: Abdomen is soft.   Skin:     General: Skin is warm and dry.   Neurological:      Mental Status: Alert, oriented to person, place, and time and oriented to person, place and time.   Psychiatric:         Mood and Affect: Mood normal.         Behavior: Behavior normal.         Lab Results   Component Value Date     04/30/2023    K 3.7 04/30/2023     04/30/2023    CO2 24.2 04/30/2023    BUN 17 04/30/2023    CREATININE 1.06 04/30/2023    GLUCOSE 182 (H) 04/30/2023    CALCIUM 9.0 04/30/2023    AST 16 04/30/2023    ALT 23 04/30/2023    ALKPHOS 90 04/30/2023     No results found for: CKTOTAL  Lab Results   Component Value Date    WBC 13.13 (H) 04/30/2023    HGB 15.6 04/30/2023    HCT 46.1 04/30/2023     04/30/2023     Lab Results   Component Value Date    INR 1.03 08/03/2020    INR 0.90 02/23/2020    INR 0.94 07/20/2019     Lab Results   Component Value Date    MG 2.1 11/24/2022     Lab Results   Component Value Date    TSH 1.410 11/24/2022    TRIG 112 08/08/2018    HDL 34 (L) 08/08/2018     (H) 08/08/2018      No results found for: BNP        Procedures      Assessment & Plan    Diagnosis Plan   1. Nonsustained supraventricular tachycardia, clinically asymptomatic and stable.  Basic Metabolic Panel      2. Essential hypertension  Basic Metabolic Panel      3. Bilateral leg edema  Basic Metabolic Panel    furosemide (LASIX) 20 MG tablet    potassium chloride 10 MEQ CR tablet      4. Anxiety        5. Dizziness                      Recommendations:    1. NSVT/SVT - EF normal. No recent palpitations. Continue metoprolol.  2. Essential Hypertension - BP has been stable at home, in the 130's systolic. Will monitor for improvement with initiation of diuretic.  3. Bilateral leg edema - EF normal. Does not appear to be in acute heart failure today. Advised him to follow a low sodium diet. Keep feet elevated. Will start Lasix 20 mg daily with Potassium chloride 10 meQ daily. BMP in one week.  4. Anxiety - We have discussed referral to behavioral health. He wants to think about this and discuss with his PCP.  5. Dizziness - suggested neurology referral. He also wants to discuss this with PCP.  6. Follow up in 4 months or sooner if needed.         Return in about 4 months (around 9/25/2023) for Recheck.    As always, I appreciate very much the opportunity to participate in the cardiovascular care of your patients.      With Best Regards,    LORENZO Moses

## 2023-06-01 ENCOUNTER — LAB (OUTPATIENT)
Dept: LAB | Facility: HOSPITAL | Age: 83
End: 2023-06-01
Payer: MEDICARE

## 2023-06-01 DIAGNOSIS — I10 ESSENTIAL HYPERTENSION: ICD-10-CM

## 2023-06-01 DIAGNOSIS — I47.1 NONSUSTAINED SUPRAVENTRICULAR TACHYCARDIA: ICD-10-CM

## 2023-06-01 DIAGNOSIS — R60.0 BILATERAL LEG EDEMA: ICD-10-CM

## 2023-06-01 DIAGNOSIS — N28.9 ABNORMAL RENAL FUNCTION: Primary | ICD-10-CM

## 2023-06-01 LAB
ANION GAP SERPL CALCULATED.3IONS-SCNC: 10.8 MMOL/L (ref 5–15)
BUN SERPL-MCNC: 22 MG/DL (ref 8–23)
BUN/CREAT SERPL: 17.1 (ref 7–25)
CALCIUM SPEC-SCNC: 9.1 MG/DL (ref 8.6–10.5)
CHLORIDE SERPL-SCNC: 103 MMOL/L (ref 98–107)
CO2 SERPL-SCNC: 25.2 MMOL/L (ref 22–29)
CREAT SERPL-MCNC: 1.29 MG/DL (ref 0.76–1.27)
EGFRCR SERPLBLD CKD-EPI 2021: 54.3 ML/MIN/1.73
GLUCOSE SERPL-MCNC: 108 MG/DL (ref 65–99)
POTASSIUM SERPL-SCNC: 3.2 MMOL/L (ref 3.5–5.2)
SODIUM SERPL-SCNC: 139 MMOL/L (ref 136–145)

## 2023-06-01 PROCEDURE — 36415 COLL VENOUS BLD VENIPUNCTURE: CPT

## 2023-06-01 PROCEDURE — 80048 BASIC METABOLIC PNL TOTAL CA: CPT

## 2023-06-02 ENCOUNTER — TELEPHONE (OUTPATIENT)
Dept: CARDIOLOGY | Facility: CLINIC | Age: 83
End: 2023-06-02
Payer: MEDICARE

## 2023-06-06 ENCOUNTER — LAB (OUTPATIENT)
Dept: LAB | Facility: HOSPITAL | Age: 83
End: 2023-06-06
Payer: MEDICARE

## 2023-06-06 ENCOUNTER — TELEPHONE (OUTPATIENT)
Dept: CARDIOLOGY | Facility: CLINIC | Age: 83
End: 2023-06-06
Payer: MEDICARE

## 2023-06-06 DIAGNOSIS — N28.9 ABNORMAL RENAL FUNCTION: ICD-10-CM

## 2023-06-06 PROCEDURE — 36415 COLL VENOUS BLD VENIPUNCTURE: CPT

## 2023-06-06 PROCEDURE — 80048 BASIC METABOLIC PNL TOTAL CA: CPT

## 2023-06-06 NOTE — TELEPHONE ENCOUNTER
Pt called and wanted to report his weight for last 4 days. Sat it was 233.2 lbs, Sun 232.4 lbs, Mon 232.4 lbs, Tue 231.6 lbs. He also stated that his prostate is swelling and wanted to know if it was a side effect of the medications. I advised him to contact his pharmacy to see what they are and to also contact his PCP. He expressed understanding.

## 2023-06-07 LAB
ANION GAP SERPL CALCULATED.3IONS-SCNC: 12 MMOL/L (ref 5–15)
BUN SERPL-MCNC: 18 MG/DL (ref 8–23)
BUN/CREAT SERPL: 16.8 (ref 7–25)
CALCIUM SPEC-SCNC: 8.6 MG/DL (ref 8.6–10.5)
CHLORIDE SERPL-SCNC: 102 MMOL/L (ref 98–107)
CO2 SERPL-SCNC: 24 MMOL/L (ref 22–29)
CREAT SERPL-MCNC: 1.07 MG/DL (ref 0.76–1.27)
EGFRCR SERPLBLD CKD-EPI 2021: 68 ML/MIN/1.73
GLUCOSE SERPL-MCNC: 134 MG/DL (ref 65–99)
POTASSIUM SERPL-SCNC: 4 MMOL/L (ref 3.5–5.2)
SODIUM SERPL-SCNC: 138 MMOL/L (ref 136–145)

## 2023-07-03 ENCOUNTER — TELEPHONE (OUTPATIENT)
Dept: CARDIOLOGY | Facility: CLINIC | Age: 83
End: 2023-07-03

## 2023-07-03 NOTE — TELEPHONE ENCOUNTER
Caller: Edward Rodas    Relationship to patient: Self    Best call back number: [phone]    Chief complaint: PATIENT WAS ADVISED BY PCP TO FOLLOW UP WITH CARDIO REGARDING HIS LASIX.     Type of visit: FOLLOW UP     Requested date: ASAP/AFTERNOONS

## 2023-07-03 NOTE — TELEPHONE ENCOUNTER
Called pt and he stated he wanted to come in the office to discuss it with Louann. I made him an apt for Wednesday.

## 2023-07-24 ENCOUNTER — OFFICE VISIT (OUTPATIENT)
Dept: UROLOGY | Facility: CLINIC | Age: 83
End: 2023-07-24
Payer: MEDICARE

## 2023-07-24 VITALS
WEIGHT: 242 LBS | DIASTOLIC BLOOD PRESSURE: 73 MMHG | SYSTOLIC BLOOD PRESSURE: 155 MMHG | HEIGHT: 69 IN | HEART RATE: 73 BPM | BODY MASS INDEX: 35.84 KG/M2

## 2023-07-24 DIAGNOSIS — N42.9 DISORDER OF PROSTATE: ICD-10-CM

## 2023-07-24 DIAGNOSIS — R79.89 LOW TESTOSTERONE: Primary | ICD-10-CM

## 2023-07-24 PROCEDURE — 1160F RVW MEDS BY RX/DR IN RCRD: CPT | Performed by: UROLOGY

## 2023-07-24 PROCEDURE — 3078F DIAST BP <80 MM HG: CPT | Performed by: UROLOGY

## 2023-07-24 PROCEDURE — 84403 ASSAY OF TOTAL TESTOSTERONE: CPT | Performed by: UROLOGY

## 2023-07-24 PROCEDURE — 82670 ASSAY OF TOTAL ESTRADIOL: CPT | Performed by: UROLOGY

## 2023-07-24 PROCEDURE — 85027 COMPLETE CBC AUTOMATED: CPT | Performed by: UROLOGY

## 2023-07-24 PROCEDURE — 99203 OFFICE O/P NEW LOW 30 MIN: CPT | Performed by: UROLOGY

## 2023-07-24 PROCEDURE — 84153 ASSAY OF PSA TOTAL: CPT | Performed by: UROLOGY

## 2023-07-24 PROCEDURE — 3077F SYST BP >= 140 MM HG: CPT | Performed by: UROLOGY

## 2023-07-24 PROCEDURE — 1159F MED LIST DOCD IN RCRD: CPT | Performed by: UROLOGY

## 2023-07-24 NOTE — PROGRESS NOTES
Chief Complaint:      Chief Complaint   Patient presents with    Inflammatory disease of prostate       HPI:   83 y.o. male referred for evaluation of inflammatory bowel disease.  He has hesitancy.  He gets up 2 times at night.  He saw Hung Gray in 2015.  He had a history of prostatitis he has a normal prostate.  I Chery go ahead and initiate a work-up with a PSA quite honestly I do not recommend intervention as he has had lots of problems from side effects.  We will see how he does we will check his labs we will see him back based on this.    Past Medical History:     Past Medical History:   Diagnosis Date    Allergic rhinitis     Asthma     Cancer     basal cell skin cancer    Chronic diastolic CHF (congestive heart failure)     Disease of thyroid gland     Dysrhythmia     GERD (gastroesophageal reflux disease)     Heart murmur     as infant    History of ventricular tachycardia     Hyperlipidemia     Hypertension     Near syncope        Current Meds:     Current Outpatient Medications   Medication Sig Dispense Refill    acetaminophen (TYLENOL) 500 MG tablet Take 1 tablet by mouth Every 6 (Six) Hours As Needed for Mild Pain or Fever.      albuterol sulfate  (90 Base) MCG/ACT inhaler Inhale 2 puffs Every 4 (Four) Hours As Needed for Wheezing. 8.5 g 0    amLODIPine (NORVASC) 10 MG tablet Take 5 mg by mouth Every Night.      budesonide-formoterol (SYMBICORT) 160-4.5 MCG/ACT inhaler Inhale 2 puffs 2 (Two) Times a Day.      cholecalciferol (VITAMIN D3) 25 MCG (1000 UT) tablet Take 1 tablet by mouth Daily.      clonazePAM (KlonoPIN) 0.5 MG tablet Take 1 tablet by mouth 2 (Two) Times a Day As Needed.      furosemide (LASIX) 20 MG tablet Take 1 tablet by mouth Daily. 30 tablet 2    hydrALAZINE (APRESOLINE) 25 MG tablet Take 1 tablet by mouth 3 (Three) Times a Day. 90 tablet 2    levothyroxine (SYNTHROID, LEVOTHROID) 112 MCG tablet Take 1 tablet by mouth Daily.      metoprolol succinate XL (TOPROL-XL) 25 MG 24 hr  tablet Take 1 tablet by mouth Daily. Take with metoprolol succinate 50 mg for a total of 75 mg daily. 30 tablet 5    metoprolol succinate XL (TOPROL-XL) 50 MG 24 hr tablet Take 1 tablet by mouth Daily. 90 tablet 2    valsartan-hydrochlorothiazide (DIOVAN-HCT) 160-12.5 MG per tablet Take 1 tablet by mouth Daily.      potassium chloride 10 MEQ CR tablet Take 1 tablet by mouth Daily. (Patient not taking: Reported on 2023) 30 tablet 2     No current facility-administered medications for this visit.        Allergies:      Allergies   Allergen Reactions    Sulfa Antibiotics Myalgia     States lowers blood sugar    Diprivan [Propofol] Angioedema    Statins Mental Status Change and Myalgia    Shellfish-Derived Products Hives and Itching        Past Surgical History:     Past Surgical History:   Procedure Laterality Date    TONSILLECTOMY         Social History:     Social History     Socioeconomic History    Marital status:    Tobacco Use    Smoking status: Former     Packs/day: 1.00     Types: Cigarettes     Quit date:      Years since quittin.6    Smokeless tobacco: Never   Vaping Use    Vaping Use: Never used   Substance and Sexual Activity    Alcohol use: No    Drug use: No    Sexual activity: Defer       Family History:     Family History   Problem Relation Age of Onset    Heart disease Brother        Review of Systems:     Review of Systems   Constitutional: Negative.    HENT: Negative.     Eyes: Negative.    Respiratory: Negative.     Cardiovascular: Negative.    Gastrointestinal: Negative.    Endocrine: Negative.    Musculoskeletal: Negative.    Allergic/Immunologic: Negative.    Neurological: Negative.    Hematological: Negative.    Psychiatric/Behavioral: Negative.       Physical Exam:     Physical Exam  Vitals and nursing note reviewed.   Constitutional:       Appearance: He is well-developed.   HENT:      Head: Normocephalic and atraumatic.   Eyes:      Conjunctiva/sclera: Conjunctivae  normal.      Pupils: Pupils are equal, round, and reactive to light.   Cardiovascular:      Rate and Rhythm: Normal rate and regular rhythm.      Heart sounds: Normal heart sounds.   Pulmonary:      Effort: Pulmonary effort is normal.      Breath sounds: Normal breath sounds.   Abdominal:      General: Bowel sounds are normal.      Palpations: Abdomen is soft.   Genitourinary:     Prostate: Normal.      Rectum: Normal.   Musculoskeletal:         General: Normal range of motion.      Cervical back: Normal range of motion.   Skin:     General: Skin is warm and dry.   Neurological:      Mental Status: He is alert and oriented to person, place, and time.      Deep Tendon Reflexes: Reflexes are normal and symmetric.   Psychiatric:         Behavior: Behavior normal.         Thought Content: Thought content normal.         Judgment: Judgment normal.       I have reviewed the following portions of the patient's history: Allergies, current medications, past family history, past medical history, past social history, past surgical history, problem list, and ROS and confirm it is accurate.    Recent Image (CT and/or KUB):      CT Abdomen and Pelvis: No results found for this or any previous visit.       CT Stone Protocol: No results found for this or any previous visit.       KUB: No results found for this or any previous visit.       Labs (past 3 months):      Lab on 07/05/2023   Component Date Value Ref Range Status    Glucose 07/05/2023 139 (H)  65 - 99 mg/dL Final    BUN 07/05/2023 19  8 - 23 mg/dL Final    Creatinine 07/05/2023 1.14  0.76 - 1.27 mg/dL Final    Sodium 07/05/2023 139  136 - 145 mmol/L Final    Potassium 07/05/2023 3.9  3.5 - 5.2 mmol/L Final    Chloride 07/05/2023 105  98 - 107 mmol/L Final    CO2 07/05/2023 24.0  22.0 - 29.0 mmol/L Final    Calcium 07/05/2023 9.0  8.6 - 10.5 mg/dL Final    Total Protein 07/05/2023 6.3  6.0 - 8.5 g/dL Final    Albumin 07/05/2023 3.8  3.5 - 5.2 g/dL Final    ALT (SGPT)  07/05/2023 26  1 - 41 U/L Final    AST (SGOT) 07/05/2023 18  1 - 40 U/L Final    Alkaline Phosphatase 07/05/2023 85  39 - 117 U/L Final    Total Bilirubin 07/05/2023 0.2  0.0 - 1.2 mg/dL Final    Globulin 07/05/2023 2.5  gm/dL Final    A/G Ratio 07/05/2023 1.5  g/dL Final    BUN/Creatinine Ratio 07/05/2023 16.7  7.0 - 25.0 Final    Anion Gap 07/05/2023 10.0  5.0 - 15.0 mmol/L Final    eGFR 07/05/2023 63.0  >60.0 mL/min/1.73 Final    proBNP 07/05/2023 77.3  0.0 - 1,800.0 pg/mL Final    TSH 07/05/2023 4.200  0.270 - 4.200 uIU/mL Final    Magnesium 07/05/2023 2.5 (H)  1.6 - 2.4 mg/dL Final    WBC 07/05/2023 13.58 (H)  3.40 - 10.80 10*3/mm3 Final    RBC 07/05/2023 5.18  4.14 - 5.80 10*6/mm3 Final    Hemoglobin 07/05/2023 15.9  13.0 - 17.7 g/dL Final    Hematocrit 07/05/2023 47.3  37.5 - 51.0 % Final    MCV 07/05/2023 91.3  79.0 - 97.0 fL Final    MCH 07/05/2023 30.7  26.6 - 33.0 pg Final    MCHC 07/05/2023 33.6  31.5 - 35.7 g/dL Final    RDW 07/05/2023 13.6  12.3 - 15.4 % Final    RDW-SD 07/05/2023 45.7  37.0 - 54.0 fl Final    MPV 07/05/2023 11.1  6.0 - 12.0 fL Final    Platelets 07/05/2023 222  140 - 450 10*3/mm3 Final    Neutrophil % 07/05/2023 67.0  42.7 - 76.0 % Final    Lymphocyte % 07/05/2023 25.3  19.6 - 45.3 % Final    Monocyte % 07/05/2023 5.7  5.0 - 12.0 % Final    Eosinophil % 07/05/2023 1.3  0.3 - 6.2 % Final    Basophil % 07/05/2023 0.2  0.0 - 1.5 % Final    Immature Grans % 07/05/2023 0.5  0.0 - 0.5 % Final    Neutrophils, Absolute 07/05/2023 9.10 (H)  1.70 - 7.00 10*3/mm3 Final    Lymphocytes, Absolute 07/05/2023 3.44 (H)  0.70 - 3.10 10*3/mm3 Final    Monocytes, Absolute 07/05/2023 0.77  0.10 - 0.90 10*3/mm3 Final    Eosinophils, Absolute 07/05/2023 0.17  0.00 - 0.40 10*3/mm3 Final    Basophils, Absolute 07/05/2023 0.03  0.00 - 0.20 10*3/mm3 Final    Immature Grans, Absolute 07/05/2023 0.07 (H)  0.00 - 0.05 10*3/mm3 Final    nRBC 07/05/2023 0.0  0.0 - 0.2 /100 WBC Final   Lab on 06/06/2023   Component  Date Value Ref Range Status    Glucose 06/06/2023 134 (H)  65 - 99 mg/dL Final    BUN 06/06/2023 18  8 - 23 mg/dL Final    Creatinine 06/06/2023 1.07  0.76 - 1.27 mg/dL Final    Sodium 06/06/2023 138  136 - 145 mmol/L Final    Potassium 06/06/2023 4.0  3.5 - 5.2 mmol/L Final    Chloride 06/06/2023 102  98 - 107 mmol/L Final    CO2 06/06/2023 24.0  22.0 - 29.0 mmol/L Final    Calcium 06/06/2023 8.6  8.6 - 10.5 mg/dL Final    BUN/Creatinine Ratio 06/06/2023 16.8  7.0 - 25.0 Final    Anion Gap 06/06/2023 12.0  5.0 - 15.0 mmol/L Final    eGFR 06/06/2023 68.0  >60.0 mL/min/1.73 Final   Lab on 06/01/2023   Component Date Value Ref Range Status    Glucose 06/01/2023 108 (H)  65 - 99 mg/dL Final    BUN 06/01/2023 22  8 - 23 mg/dL Final    Creatinine 06/01/2023 1.29 (H)  0.76 - 1.27 mg/dL Final    Sodium 06/01/2023 139  136 - 145 mmol/L Final    Potassium 06/01/2023 3.2 (L)  3.5 - 5.2 mmol/L Final    Chloride 06/01/2023 103  98 - 107 mmol/L Final    CO2 06/01/2023 25.2  22.0 - 29.0 mmol/L Final    Calcium 06/01/2023 9.1  8.6 - 10.5 mg/dL Final    BUN/Creatinine Ratio 06/01/2023 17.1  7.0 - 25.0 Final    Anion Gap 06/01/2023 10.8  5.0 - 15.0 mmol/L Final    eGFR 06/01/2023 54.3 (L)  >60.0 mL/min/1.73 Final   Admission on 04/30/2023, Discharged on 05/01/2023   Component Date Value Ref Range Status    Glucose 04/30/2023 182 (H)  65 - 99 mg/dL Final    BUN 04/30/2023 17  8 - 23 mg/dL Final    Creatinine 04/30/2023 1.06  0.76 - 1.27 mg/dL Final    Sodium 04/30/2023 139  136 - 145 mmol/L Final    Potassium 04/30/2023 3.7  3.5 - 5.2 mmol/L Final    Slight hemolysis detected by analyzer. Results may be affected.    Chloride 04/30/2023 103  98 - 107 mmol/L Final    CO2 04/30/2023 24.2  22.0 - 29.0 mmol/L Final    Calcium 04/30/2023 9.0  8.6 - 10.5 mg/dL Final    Total Protein 04/30/2023 6.7  6.0 - 8.5 g/dL Final    Albumin 04/30/2023 3.8  3.5 - 5.2 g/dL Final    ALT (SGPT) 04/30/2023 23  1 - 41 U/L Final    AST (SGOT) 04/30/2023 16   1 - 40 U/L Final    Alkaline Phosphatase 04/30/2023 90  39 - 117 U/L Final    Total Bilirubin 04/30/2023 0.3  0.0 - 1.2 mg/dL Final    Globulin 04/30/2023 2.9  gm/dL Final    A/G Ratio 04/30/2023 1.3  g/dL Final    BUN/Creatinine Ratio 04/30/2023 16.0  7.0 - 25.0 Final    Anion Gap 04/30/2023 11.8  5.0 - 15.0 mmol/L Final    eGFR 04/30/2023 68.8  >60.0 mL/min/1.73 Final    COVID19 04/30/2023 Not Detected  Not Detected - Ref. Range Final    Influenza A PCR 04/30/2023 Not Detected  Not Detected Final    Influenza B PCR 04/30/2023 Not Detected  Not Detected Final    HS Troponin T 04/30/2023 16 (H)  <15 ng/L Final    WBC 04/30/2023 13.13 (H)  3.40 - 10.80 10*3/mm3 Final    RBC 04/30/2023 5.00  4.14 - 5.80 10*6/mm3 Final    Hemoglobin 04/30/2023 15.6  13.0 - 17.7 g/dL Final    Hematocrit 04/30/2023 46.1  37.5 - 51.0 % Final    MCV 04/30/2023 92.2  79.0 - 97.0 fL Final    MCH 04/30/2023 31.2  26.6 - 33.0 pg Final    MCHC 04/30/2023 33.8  31.5 - 35.7 g/dL Final    RDW 04/30/2023 14.2  12.3 - 15.4 % Final    RDW-SD 04/30/2023 47.8  37.0 - 54.0 fl Final    MPV 04/30/2023 10.0  6.0 - 12.0 fL Final    Platelets 04/30/2023 187  140 - 450 10*3/mm3 Final    Neutrophil % 04/30/2023 65.7  42.7 - 76.0 % Final    Lymphocyte % 04/30/2023 23.9  19.6 - 45.3 % Final    Monocyte % 04/30/2023 7.8  5.0 - 12.0 % Final    Eosinophil % 04/30/2023 1.9  0.3 - 6.2 % Final    Basophil % 04/30/2023 0.2  0.0 - 1.5 % Final    Immature Grans % 04/30/2023 0.5  0.0 - 0.5 % Final    Neutrophils, Absolute 04/30/2023 8.63 (H)  1.70 - 7.00 10*3/mm3 Final    Lymphocytes, Absolute 04/30/2023 3.14 (H)  0.70 - 3.10 10*3/mm3 Final    Monocytes, Absolute 04/30/2023 1.03 (H)  0.10 - 0.90 10*3/mm3 Final    Eosinophils, Absolute 04/30/2023 0.25  0.00 - 0.40 10*3/mm3 Final    Basophils, Absolute 04/30/2023 0.02  0.00 - 0.20 10*3/mm3 Final    Immature Grans, Absolute 04/30/2023 0.06 (H)  0.00 - 0.05 10*3/mm3 Final    nRBC 04/30/2023 0.0  0.0 - 0.2 /100 WBC Final     proBNP 04/30/2023 119.4  0.0 - 1,800.0 pg/mL Final    HS Troponin T 05/01/2023 16 (H)  <15 ng/L Final    Troponin T Delta 05/01/2023 0  >=-4 - <+4 ng/L Final    Glucose 05/01/2023 149 (H)  70 - 130 mg/dL Final    RN Notified Meter: [id number] : [id number] juan antonio rodriguez    QT Interval 04/30/2023 378  ms Final    QTC Interval 04/30/2023 446  ms Final        Procedure:       Assessment/Plan:   BPH: Discussed the pathophysiology of BPH and obstruction.  We discussed the static and dynamic effects of BPH as well as using 5 alpha reductase inhibitors versus alpha blockade.  We discussed the indications for transurethral surgery as well and/ or other therapeutic options available including all of the newer techniques.  Going to get a PSA first and reevaluate I am not sure that the side effects are worth additional treatment we discussed these at great length        This document has been electronically signed by LUCIO JAIME MD July 24, 2023 14:58 EDT    Dictated Utilizing Dragon Dictation: Part of this note may be an electronic transcription/translation of spoken language to printed text using the Dragon Dictation System.

## 2023-07-25 LAB
DEPRECATED RDW RBC AUTO: 45 FL (ref 37–54)
ERYTHROCYTE [DISTWIDTH] IN BLOOD BY AUTOMATED COUNT: 13.5 % (ref 12.3–15.4)
ESTRADIOL SERPL HS-MCNC: 29.5 PG/ML
HCT VFR BLD AUTO: 47.3 % (ref 37.5–51)
HGB BLD-MCNC: 16 G/DL (ref 13–17.7)
MCH RBC QN AUTO: 30.8 PG (ref 26.6–33)
MCHC RBC AUTO-ENTMCNC: 33.8 G/DL (ref 31.5–35.7)
MCV RBC AUTO: 91 FL (ref 79–97)
PLATELET # BLD AUTO: 218 10*3/MM3 (ref 140–450)
PMV BLD AUTO: 11.9 FL (ref 6–12)
PSA SERPL-MCNC: 6.47 NG/ML (ref 0–4)
RBC # BLD AUTO: 5.2 10*6/MM3 (ref 4.14–5.8)
TESTOST SERPL-MCNC: 224 NG/DL (ref 193–740)
WBC NRBC COR # BLD: 13.7 10*3/MM3 (ref 3.4–10.8)

## 2023-07-26 ENCOUNTER — HOSPITAL ENCOUNTER (OUTPATIENT)
Dept: CARDIOLOGY | Facility: HOSPITAL | Age: 83
Discharge: HOME | End: 2023-07-26
Admitting: NURSE PRACTITIONER
Payer: MEDICARE

## 2023-07-26 DIAGNOSIS — R60.0 BILATERAL LEG EDEMA: ICD-10-CM

## 2023-07-26 DIAGNOSIS — R06.09 DYSPNEA ON EXERTION: ICD-10-CM

## 2023-07-26 PROBLEM — N42.9 DISORDER OF PROSTATE: Status: ACTIVE | Noted: 2023-07-26

## 2023-07-26 PROBLEM — R79.89 LOW TESTOSTERONE: Status: ACTIVE | Noted: 2023-07-26

## 2023-07-26 PROCEDURE — 93308 TTE F-UP OR LMTD: CPT

## 2023-07-26 PROCEDURE — 93308 TTE F-UP OR LMTD: CPT | Performed by: INTERNAL MEDICINE

## 2023-07-27 LAB
BH CV ECHO MEAS - EDV(MOD-SP4): 57.7 ML
BH CV ECHO MEAS - EF(MOD-SP4): 61.4 %
BH CV ECHO MEAS - ESV(MOD-SP4): 22.3 ML
BH CV ECHO MEAS - LV DIASTOLIC VOL/BSA (35-75): 25.8 CM2
BH CV ECHO MEAS - LV SYSTOLIC VOL/BSA (12-30): 10 CM2
BH CV ECHO MEAS - SI(MOD-SP4): 15.8 ML/M2
BH CV ECHO MEAS - SV(MOD-SP4): 35.4 ML

## 2023-08-02 ENCOUNTER — OFFICE VISIT (OUTPATIENT)
Dept: CARDIOLOGY | Facility: CLINIC | Age: 83
End: 2023-08-02
Payer: MEDICARE

## 2023-08-02 VITALS
WEIGHT: 240 LBS | BODY MASS INDEX: 35.55 KG/M2 | HEIGHT: 69 IN | DIASTOLIC BLOOD PRESSURE: 84 MMHG | SYSTOLIC BLOOD PRESSURE: 154 MMHG | RESPIRATION RATE: 18 BRPM | HEART RATE: 64 BPM | OXYGEN SATURATION: 94 %

## 2023-08-02 DIAGNOSIS — I47.1 NONSUSTAINED SUPRAVENTRICULAR TACHYCARDIA: ICD-10-CM

## 2023-08-02 DIAGNOSIS — I10 ESSENTIAL HYPERTENSION: Primary | ICD-10-CM

## 2023-08-02 DIAGNOSIS — R60.0 BILATERAL LEG EDEMA: ICD-10-CM

## 2023-08-02 DIAGNOSIS — I47.29 NONSUSTAINED VENTRICULAR TACHYCARDIA: ICD-10-CM

## 2023-08-02 PROCEDURE — 1160F RVW MEDS BY RX/DR IN RCRD: CPT | Performed by: NURSE PRACTITIONER

## 2023-08-02 PROCEDURE — 3077F SYST BP >= 140 MM HG: CPT | Performed by: NURSE PRACTITIONER

## 2023-08-02 PROCEDURE — 99213 OFFICE O/P EST LOW 20 MIN: CPT | Performed by: NURSE PRACTITIONER

## 2023-08-02 PROCEDURE — 1159F MED LIST DOCD IN RCRD: CPT | Performed by: NURSE PRACTITIONER

## 2023-08-02 PROCEDURE — 3079F DIAST BP 80-89 MM HG: CPT | Performed by: NURSE PRACTITIONER

## 2023-08-15 ENCOUNTER — TELEPHONE (OUTPATIENT)
Dept: ONCOLOGY | Facility: CLINIC | Age: 83
End: 2023-08-15

## 2023-08-15 NOTE — TELEPHONE ENCOUNTER
Caller: Edward Rodas    Relationship to patient: Self    Best call back number: [phone]    Chief complaint: SICK    Type of visit: NEW PT APPT    Requested date: AFTERNOON APPT    If rescheduling, when is the original appointment: 8/16/2023

## 2023-08-28 ENCOUNTER — OFFICE VISIT (OUTPATIENT)
Dept: UROLOGY | Facility: CLINIC | Age: 83
End: 2023-08-28
Payer: MEDICARE

## 2023-08-28 VITALS
SYSTOLIC BLOOD PRESSURE: 154 MMHG | WEIGHT: 238 LBS | HEIGHT: 69 IN | DIASTOLIC BLOOD PRESSURE: 74 MMHG | BODY MASS INDEX: 35.25 KG/M2 | HEART RATE: 71 BPM

## 2023-08-28 DIAGNOSIS — N40.0 BENIGN PROSTATIC HYPERPLASIA WITHOUT LOWER URINARY TRACT SYMPTOMS: Primary | ICD-10-CM

## 2023-08-28 PROCEDURE — 3077F SYST BP >= 140 MM HG: CPT | Performed by: UROLOGY

## 2023-08-28 PROCEDURE — 3078F DIAST BP <80 MM HG: CPT | Performed by: UROLOGY

## 2023-08-28 PROCEDURE — 99213 OFFICE O/P EST LOW 20 MIN: CPT | Performed by: UROLOGY

## 2023-08-28 NOTE — PROGRESS NOTES
Chief Complaint:      Chief Complaint   Patient presents with     Inflammatory disease of prostate       HPI:   83 y.o. 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 He has hesitancy. He gets up 2 times at night. He saw Hung Gray in 2015. He had a history of prostatitis he has a normal prostate. I will go ahead and initiate a work-up with a PSA quite honestly I do not recommend intervention as he has had lots of problems from side effects. We will see how he does we will check his labs we will see him back based on this.  Returns today.  He wants to go off his amlodipine secondary to a feeling of that he does not need it.  He has considerable stress with his wife with cancer and radiation.  He sees a nurse practitioner [place] and wants Paxil but he when he takes Paxil or clonazepam he says he can he hold his urine but when he does not take it he can but that he cannot sleep his PSA is 6.2 but given his age this is reasonable he has a low testosterone but I do not think he is a candidate for placement he says Dr. Edil Tineo has him on the clonazepam that keeps him dizzy and worries.  Overall I am having a hard time figure out what he wants he does not have a great deal of voiding symptomatology and I think a lot of his problems are related to the stress from his wife and other issues additionally I had to remind him given his age he has not gone to have normal voiding.  I will see him back in a year    Past Medical History:     Past Medical History:   Diagnosis Date    Allergic rhinitis     Asthma     Cancer     basal cell skin cancer    Chronic diastolic CHF (congestive heart failure)     Disease of thyroid gland     Dysrhythmia     GERD (gastroesophageal reflux disease)     Heart murmur     as infant    History of ventricular tachycardia     Hyperlipidemia     Hypertension     Near syncope        Current Meds:     Current Outpatient Medications   Medication Sig Dispense Refill    acetaminophen  (TYLENOL) 500 MG tablet Take 1 tablet by mouth Every 6 (Six) Hours As Needed for Mild Pain or Fever.      albuterol sulfate  (90 Base) MCG/ACT inhaler Inhale 2 puffs Every 4 (Four) Hours As Needed for Wheezing. 8.5 g 0    amLODIPine (NORVASC) 10 MG tablet Take 0.5 tablets by mouth Every Night.      budesonide-formoterol (SYMBICORT) 160-4.5 MCG/ACT inhaler Inhale 2 puffs 2 (Two) Times a Day.      cholecalciferol (VITAMIN D3) 25 MCG (1000 UT) tablet Take 1 tablet by mouth Daily.      clonazePAM (KlonoPIN) 0.5 MG tablet Take 1 tablet by mouth 2 (Two) Times a Day As Needed.      levothyroxine (SYNTHROID, LEVOTHROID) 112 MCG tablet Take 1 tablet by mouth Daily.      metoprolol succinate XL (TOPROL-XL) 25 MG 24 hr tablet Take 1 tablet by mouth Daily. Take with metoprolol succinate 50 mg for a total of 75 mg daily. 30 tablet 5    metoprolol succinate XL (TOPROL-XL) 50 MG 24 hr tablet Take 1 tablet by mouth Daily. 90 tablet 2    valsartan-hydrochlorothiazide (DIOVAN-HCT) 160-12.5 MG per tablet Take 1 tablet by mouth Daily.       No current facility-administered medications for this visit.        Allergies:      Allergies   Allergen Reactions    Sulfa Antibiotics Myalgia     States lowers blood sugar    Diprivan [Propofol] Angioedema    Statins Mental Status Change and Myalgia    Shellfish-Derived Products Hives and Itching        Past Surgical History:     Past Surgical History:   Procedure Laterality Date    TONSILLECTOMY         Social History:     Social History     Socioeconomic History    Marital status:    Tobacco Use    Smoking status: Former     Packs/day: 1.00     Types: Cigarettes     Quit date:      Years since quittin.6    Smokeless tobacco: Never   Vaping Use    Vaping Use: Never used   Substance and Sexual Activity    Alcohol use: No    Drug use: No    Sexual activity: Defer       Family History:     Family History   Problem Relation Age of Onset    Heart disease Brother        Review of  Systems:     Review of Systems   Constitutional: Negative.    HENT: Negative.     Eyes: Negative.    Respiratory: Negative.     Cardiovascular: Negative.    Gastrointestinal: Negative.    Endocrine: Negative.    Musculoskeletal: Negative.    Allergic/Immunologic: Negative.    Neurological: Negative.    Hematological: Negative.    Psychiatric/Behavioral: Negative.       Physical Exam:     Physical Exam  Vitals and nursing note reviewed.   Constitutional:       Appearance: He is well-developed.   HENT:      Head: Normocephalic and atraumatic.   Eyes:      Conjunctiva/sclera: Conjunctivae normal.      Pupils: Pupils are equal, round, and reactive to light.   Cardiovascular:      Rate and Rhythm: Normal rate and regular rhythm.      Heart sounds: Normal heart sounds.   Pulmonary:      Effort: Pulmonary effort is normal.      Breath sounds: Normal breath sounds.   Abdominal:      General: Bowel sounds are normal.      Palpations: Abdomen is soft.   Genitourinary:     Comments: Uses a walker  Musculoskeletal:         General: Normal range of motion.      Cervical back: Normal range of motion.   Skin:     General: Skin is warm and dry.   Neurological:      Mental Status: He is alert and oriented to person, place, and time.      Deep Tendon Reflexes: Reflexes are normal and symmetric.   Psychiatric:         Behavior: Behavior normal.         Thought Content: Thought content normal.         Judgment: Judgment normal.       I have reviewed the following portions of the patient's history: Allergies, current medications, past family history, past medical history, past social history, past surgical history, problem list, and ROS and confirm it is accurate.    Recent Image (CT and/or KUB):      CT Abdomen and Pelvis: No results found for this or any previous visit.       CT Stone Protocol: No results found for this or any previous visit.       KUB: No results found for this or any previous visit.       Labs (past 3 months):       Hospital Outpatient Visit on 07/26/2023   Component Date Value Ref Range Status    LV Sys Vol (BSA corrected) 07/26/2023 10.0  cm2 Final    LV [place] Vol (BSA corrected) 07/26/2023 25.8  cm2 Final    EDV(MOD-sp4) 07/26/2023 57.7  ml Final    ESV(MOD-sp4) 07/26/2023 22.3  ml Final    SV(MOD-sp4) 07/26/2023 35.4  ml Final    SI(MOD-sp4) 07/26/2023 15.8  ml/m2 Final    EF(MOD-sp4) 07/26/2023 61.4  % Final   Office Visit on 07/24/2023   Component Date Value Ref Range Status    PSA 07/24/2023 6.470 (H)  0.000 - 4.000 ng/mL Final    Testosterone, Total 07/24/2023 224.00  193.00 - 740.00 ng/dL Final    Estradiol 07/24/2023 29.5  pg/mL Final    WBC 07/24/2023 13.70 (H)  3.40 - 10.80 10*3/mm3 Final    RBC 07/24/2023 5.20  4.14 - 5.80 10*6/mm3 Final    Hemoglobin 07/24/2023 16.0  13.0 - 17.7 g/dL Final    Hematocrit 07/24/2023 47.3  37.5 - 51.0 % Final    MCV 07/24/2023 91.0  79.0 - 97.0 fL Final    MCH 07/24/2023 30.8  26.6 - 33.0 pg Final    MCHC 07/24/2023 33.8  31.5 - 35.7 g/dL Final    RDW 07/24/2023 13.5  12.3 - 15.4 % Final    RDW-SD 07/24/2023 45.0  37.0 - 54.0 fl Final    MPV 07/24/2023 11.9  6.0 - 12.0 fL Final    Platelets 07/24/2023 218  140 - 450 10*3/mm3 Final   Lab on 07/05/2023   Component Date Value Ref Range Status    Glucose 07/05/2023 139 (H)  65 - 99 mg/dL Final    BUN 07/05/2023 19  8 - 23 mg/dL Final    Creatinine 07/05/2023 1.14  0.76 - 1.27 mg/dL Final    Sodium 07/05/2023 139  136 - 145 mmol/L Final    Potassium 07/05/2023 3.9  3.5 - 5.2 mmol/L Final    Chloride 07/05/2023 105  98 - 107 mmol/L Final    CO2 07/05/2023 24.0  22.0 - 29.0 mmol/L Final    Calcium 07/05/2023 9.0  8.6 - 10.5 mg/dL Final    Total Protein 07/05/2023 6.3  6.0 - 8.5 g/dL Final    Albumin 07/05/2023 3.8  3.5 - 5.2 g/dL Final    ALT (SGPT) 07/05/2023 26  1 - 41 U/L Final    AST (SGOT) 07/05/2023 18  1 - 40 U/L Final    Alkaline Phosphatase 07/05/2023 85  39 - 117 U/L Final    Total Bilirubin 07/05/2023 0.2  0.0 - 1.2 mg/dL Final     Globulin 07/05/2023 2.5  gm/dL Final    A/G Ratio 07/05/2023 1.5  g/dL Final    BUN/Creatinine Ratio 07/05/2023 16.7  7.0 - 25.0 Final    Anion Gap 07/05/2023 10.0  5.0 - 15.0 mmol/L Final    eGFR 07/05/2023 63.0  >60.0 mL/min/1.73 Final    proBNP 07/05/2023 77.3  0.0 - 1,800.0 pg/mL Final    TSH 07/05/2023 4.200  0.270 - 4.200 uIU/mL Final    Magnesium 07/05/2023 2.5 (H)  1.6 - 2.4 mg/dL Final    WBC 07/05/2023 13.58 (H)  3.40 - 10.80 10*3/mm3 Final    RBC 07/05/2023 5.18  4.14 - 5.80 10*6/mm3 Final    Hemoglobin 07/05/2023 15.9  13.0 - 17.7 g/dL Final    Hematocrit 07/05/2023 47.3  37.5 - 51.0 % Final    MCV 07/05/2023 91.3  79.0 - 97.0 fL Final    MCH 07/05/2023 30.7  26.6 - 33.0 pg Final    MCHC 07/05/2023 33.6  31.5 - 35.7 g/dL Final    RDW 07/05/2023 13.6  12.3 - 15.4 % Final    RDW-SD 07/05/2023 45.7  37.0 - 54.0 fl Final    MPV 07/05/2023 11.1  6.0 - 12.0 fL Final    Platelets 07/05/2023 222  140 - 450 10*3/mm3 Final    Neutrophil % 07/05/2023 67.0  42.7 - 76.0 % Final    Lymphocyte % 07/05/2023 25.3  19.6 - 45.3 % Final    Monocyte % 07/05/2023 5.7  5.0 - 12.0 % Final    Eosinophil % 07/05/2023 1.3  0.3 - 6.2 % Final    Basophil % 07/05/2023 0.2  0.0 - 1.5 % Final    Immature Grans % 07/05/2023 0.5  0.0 - 0.5 % Final    Neutrophils, Absolute 07/05/2023 9.10 (H)  1.70 - 7.00 10*3/mm3 Final    Lymphocytes, Absolute 07/05/2023 3.44 (H)  0.70 - 3.10 10*3/mm3 Final    Monocytes, Absolute 07/05/2023 0.77  0.10 - 0.90 10*3/mm3 Final    Eosinophils, Absolute 07/05/2023 0.17  0.00 - 0.40 10*3/mm3 Final    Basophils, Absolute 07/05/2023 0.03  0.00 - 0.20 10*3/mm3 Final    Immature Grans, Absolute 07/05/2023 0.07 (H)  0.00 - 0.05 10*3/mm3 Final    nRBC 07/05/2023 0.0  0.0 - 0.2 /100 WBC Final   Lab on 06/06/2023   Component Date Value Ref Range Status    Glucose 06/06/2023 134 (H)  65 - 99 mg/dL Final    BUN 06/06/2023 18  8 - 23 mg/dL Final    Creatinine 06/06/2023 1.07  0.76 - 1.27 mg/dL Final    Sodium  06/06/2023 138  136 - 145 mmol/L Final    Potassium 06/06/2023 4.0  3.5 - 5.2 mmol/L Final    Chloride 06/06/2023 102  98 - 107 mmol/L Final    CO2 06/06/2023 24.0  22.0 - 29.0 mmol/L Final    Calcium 06/06/2023 8.6  8.6 - 10.5 mg/dL Final    BUN/Creatinine Ratio 06/06/2023 16.8  7.0 - 25.0 Final    Anion Gap 06/06/2023 12.0  5.0 - 15.0 mmol/L Final    eGFR 06/06/2023 68.0  >60.0 mL/min/1.73 Final   Lab on 06/01/2023   Component Date Value Ref Range Status    Glucose 06/01/2023 108 (H)  65 - 99 mg/dL Final    BUN 06/01/2023 22  8 - 23 mg/dL Final    Creatinine 06/01/2023 1.29 (H)  0.76 - 1.27 mg/dL Final    Sodium 06/01/2023 139  136 - 145 mmol/L Final    Potassium 06/01/2023 3.2 (L)  3.5 - 5.2 mmol/L Final    Chloride 06/01/2023 103  98 - 107 mmol/L Final    CO2 06/01/2023 25.2  22.0 - 29.0 mmol/L Final    Calcium 06/01/2023 9.1  8.6 - 10.5 mg/dL Final    BUN/Creatinine Ratio 06/01/2023 17.1  7.0 - 25.0 Final    Anion Gap 06/01/2023 10.8  5.0 - 15.0 mmol/L Final    eGFR 06/01/2023 54.3 (L)  >60.0 mL/min/1.73 Final        Procedure:       Assessment/Plan:   BPH: Discussed the pathophysiology of BPH and obstruction.  We discussed the static and dynamic effects of BPH as well as using 5 alpha reductase inhibitors versus alpha blockade.  We discussed the indications for transurethral surgery as well and/ or other therapeutic options available including all of the newer techniques.  Currently stable he has a number of other concerns or conditions but nothing that I can really impact  Elevated prostate specific antigen-we discussed the diagnosis of elevated prostate-specific antigen.  I explained the pathophysiology of PSA.  It is a serine protease that's function in the male reproductive tract is to facilitate the liquefaction of semen.  It is for this reason the body does not want it freely floating in the serum and why typically bound tightly to albumin.  We discussed why we used both a PSA free and total to determine  the need for more aggressive therapy. I discussed the normal range.  Additionally, it was in the range of 1 to 4, but more recently has been downgraded to something less than 2 or even approaching 1.  I discussed the risk of family history, particularly the fact that the average male has a 14% risk of prostate cancer and that in the face of a positive diagnosis in a father it will tablet and any other first-generation relative continued tablet insofar that a father and brother with prostate cancer will produce almost a 50% risk of prostate cancer.  I discussed the use of the temporal use of PSA as the best option for monitoring.  We also discussed the fact that an elevated PSA is an isolated event does not mean that this is prostate cancer and should not engender worry in this regard. I discussed other things that can elevate PSA including constipation, prostatitis, infection, recent intercourse etc., as well as the risks and benefits associated with this.  Also discussed the fact that this is with a dilutional test and as a consequence of such were present produce slight variations on a single specimen.  Further discussed the risks and benefits of a prostate biopsy as well.  He has an elevated PSA but quite honestly given his age and overall numerous comorbidities observation is instigated        This document has been electronically signed by LUCIO JAIME MD August 28, 2023 12:49 EDT    Dictated Utilizing Dragon Dictation: Part of this note may be an electronic transcription/translation of spoken language to printed text using the Dragon Dictation System.

## 2023-08-29 ENCOUNTER — CONSULT (OUTPATIENT)
Dept: ONCOLOGY | Facility: CLINIC | Age: 83
End: 2023-08-29
Payer: MEDICARE

## 2023-08-29 VITALS
RESPIRATION RATE: 18 BRPM | TEMPERATURE: 97.6 F | HEART RATE: 66 BPM | BODY MASS INDEX: 35.4 KG/M2 | WEIGHT: 239 LBS | DIASTOLIC BLOOD PRESSURE: 79 MMHG | SYSTOLIC BLOOD PRESSURE: 168 MMHG | OXYGEN SATURATION: 97 % | HEIGHT: 69 IN

## 2023-08-29 DIAGNOSIS — R53.83 OTHER FATIGUE: ICD-10-CM

## 2023-08-29 DIAGNOSIS — Z11.4 ENCOUNTER FOR SCREENING FOR HUMAN IMMUNODEFICIENCY VIRUS (HIV): ICD-10-CM

## 2023-08-29 DIAGNOSIS — D72.829 LEUKOCYTOSIS, UNSPECIFIED TYPE: Primary | ICD-10-CM

## 2023-08-29 LAB
ALBUMIN SERPL-MCNC: 3.9 G/DL (ref 3.5–5.2)
ALBUMIN/GLOB SERPL: 1.3 G/DL
ALP SERPL-CCNC: 86 U/L (ref 39–117)
ALT SERPL W P-5'-P-CCNC: 26 U/L (ref 1–41)
ANION GAP SERPL CALCULATED.3IONS-SCNC: 10.5 MMOL/L (ref 5–15)
AST SERPL-CCNC: 21 U/L (ref 1–40)
BASOPHILS # BLD AUTO: 0.04 10*3/MM3 (ref 0–0.2)
BASOPHILS NFR BLD AUTO: 0.3 % (ref 0–1.5)
BILIRUB SERPL-MCNC: 0.4 MG/DL (ref 0–1.2)
BUN SERPL-MCNC: 17 MG/DL (ref 8–23)
BUN/CREAT SERPL: 15.6 (ref 7–25)
CALCIUM SPEC-SCNC: 8.8 MG/DL (ref 8.6–10.5)
CHLORIDE SERPL-SCNC: 104 MMOL/L (ref 98–107)
CHROMATIN AB SERPL-ACNC: <10 IU/ML (ref 0–14)
CO2 SERPL-SCNC: 24.5 MMOL/L (ref 22–29)
CREAT SERPL-MCNC: 1.09 MG/DL (ref 0.76–1.27)
CRP SERPL-MCNC: 0.55 MG/DL (ref 0–0.5)
DEPRECATED RDW RBC AUTO: 48.3 FL (ref 37–54)
EGFRCR SERPLBLD CKD-EPI 2021: 66.5 ML/MIN/1.73
EOSINOPHIL # BLD AUTO: 0.24 10*3/MM3 (ref 0–0.4)
EOSINOPHIL NFR BLD AUTO: 1.6 % (ref 0.3–6.2)
ERYTHROCYTE [DISTWIDTH] IN BLOOD BY AUTOMATED COUNT: 14.1 % (ref 12.3–15.4)
ERYTHROCYTE [SEDIMENTATION RATE] IN BLOOD: 17 MM/HR (ref 0–20)
GLOBULIN UR ELPH-MCNC: 2.9 GM/DL
GLUCOSE SERPL-MCNC: 142 MG/DL (ref 65–99)
HAV IGM SERPL QL IA: NORMAL
HBV CORE IGM SERPL QL IA: NORMAL
HBV SURFACE AG SERPL QL IA: NORMAL
HCT VFR BLD AUTO: 47.1 % (ref 37.5–51)
HCV AB SER DONR QL: NORMAL
HGB BLD-MCNC: 15.4 G/DL (ref 13–17.7)
HIV 1+2 AB+HIV1 P24 AG SERPL QL IA: NORMAL
IMM GRANULOCYTES # BLD AUTO: 0.08 10*3/MM3 (ref 0–0.05)
IMM GRANULOCYTES NFR BLD AUTO: 0.5 % (ref 0–0.5)
LYMPHOCYTES # BLD AUTO: 3.88 10*3/MM3 (ref 0.7–3.1)
LYMPHOCYTES NFR BLD AUTO: 26.5 % (ref 19.6–45.3)
MCH RBC QN AUTO: 30.4 PG (ref 26.6–33)
MCHC RBC AUTO-ENTMCNC: 32.7 G/DL (ref 31.5–35.7)
MCV RBC AUTO: 93.1 FL (ref 79–97)
MONOCYTES # BLD AUTO: 0.76 10*3/MM3 (ref 0.1–0.9)
MONOCYTES NFR BLD AUTO: 5.2 % (ref 5–12)
NEUTROPHILS NFR BLD AUTO: 65.9 % (ref 42.7–76)
NEUTROPHILS NFR BLD AUTO: 9.66 10*3/MM3 (ref 1.7–7)
NRBC BLD AUTO-RTO: 0 /100 WBC (ref 0–0.2)
PLATELET # BLD AUTO: 215 10*3/MM3 (ref 140–450)
PMV BLD AUTO: 10.2 FL (ref 6–12)
POTASSIUM SERPL-SCNC: 3.6 MMOL/L (ref 3.5–5.2)
PROT SERPL-MCNC: 6.8 G/DL (ref 6–8.5)
RBC # BLD AUTO: 5.06 10*6/MM3 (ref 4.14–5.8)
SODIUM SERPL-SCNC: 139 MMOL/L (ref 136–145)
WBC NRBC COR # BLD: 14.66 10*3/MM3 (ref 3.4–10.8)

## 2023-08-29 PROCEDURE — 80053 COMPREHEN METABOLIC PANEL: CPT | Performed by: NURSE PRACTITIONER

## 2023-08-29 PROCEDURE — 86038 ANTINUCLEAR ANTIBODIES: CPT | Performed by: NURSE PRACTITIONER

## 2023-08-29 PROCEDURE — 86431 RHEUMATOID FACTOR QUANT: CPT | Performed by: NURSE PRACTITIONER

## 2023-08-29 PROCEDURE — 80074 ACUTE HEPATITIS PANEL: CPT | Performed by: NURSE PRACTITIONER

## 2023-08-29 PROCEDURE — 85025 COMPLETE CBC W/AUTO DIFF WBC: CPT | Performed by: NURSE PRACTITIONER

## 2023-08-29 PROCEDURE — G0432 EIA HIV-1/HIV-2 SCREEN: HCPCS | Performed by: NURSE PRACTITIONER

## 2023-08-29 PROCEDURE — 85652 RBC SED RATE AUTOMATED: CPT | Performed by: NURSE PRACTITIONER

## 2023-08-29 PROCEDURE — 86140 C-REACTIVE PROTEIN: CPT | Performed by: NURSE PRACTITIONER

## 2023-08-29 NOTE — PROGRESS NOTES
"DATE OF CONSULTATION:  8/29/2023    REASON FOR REFERRAL: Leukocytosis    REFERRING PHYSICIAN:  LORENZO Moses    CHIEF COMPLAINT: Dizziness          HISTORY OF PRESENT ILLNESS:   Edward Rodas is a very pleasant 83 y.o. male who is being seen today at the request of LORENZO Moses for evaluation and treatment of leukocytosis. Mr. Rodas reports following with his PCP every 3 months with lab testing. He reports that he has been aware of this issue for many years. Previous available CBCs were reviewed and patient has had chronic elevation in WBC since at least July 2019 with fluctuation in counts ranging 11.36-30,000 with normal H/H and platelets. Of note, patient reports that his WBC count of 30,000 was during the time he was hospitalized with COVID pneumonia. He reports that he feels like a \"83 year old male\". He reports that he tries to remain as active as possible and helps take care of his wife on a daily basis. He denies any worsening fatigue. He reports a good appetite, stable weight. Denies any fever/chills or drenching night sweats. No tender/enlarged lymph nodes. No recurrent or difficult to treat infections. No recent infections. His main complaint is dizziness. He is following with eye doctor and neurology for this. He denies any other specific complaints today.     PAST MEDICAL HISTORY:  Past Medical History:   Diagnosis Date    Allergic rhinitis     Asthma     Cancer     basal cell skin cancer    Chronic diastolic CHF (congestive heart failure)     Disease of thyroid gland     Dysrhythmia     GERD (gastroesophageal reflux disease)     Heart murmur     as infant    History of ventricular tachycardia     Hyperlipidemia     Hypertension     Near syncope        PAST SURGICAL HISTORY:  Past Surgical History:   Procedure Laterality Date    TONSILLECTOMY         FAMILY HISTORY:  Family History   Problem Relation Age of Onset    Heart disease Brother        SOCIAL HISTORY:  Social History "     Socioeconomic History    Marital status:    Tobacco Use    Smoking status: Former     Packs/day: 1.00     Types: Cigarettes     Quit date:      Years since quittin.7    Smokeless tobacco: Never   Vaping Use    Vaping Use: Never used   Substance and Sexual Activity    Alcohol use: No    Drug use: No    Sexual activity: Defer            MEDICATIONS:  The current medication list was reviewed in the EMR    Current Outpatient Medications:     acetaminophen (TYLENOL) 500 MG tablet, Take 1 tablet by mouth Every 6 (Six) Hours As Needed for Mild Pain or Fever., Disp: , Rfl:     albuterol sulfate  (90 Base) MCG/ACT inhaler, Inhale 2 puffs Every 4 (Four) Hours As Needed for Wheezing., Disp: 8.5 g, Rfl: 0    amLODIPine (NORVASC) 10 MG tablet, Take 0.5 tablets by mouth Every Night., Disp: , Rfl:     budesonide-formoterol (SYMBICORT) 160-4.5 MCG/ACT inhaler, Inhale 2 puffs 2 (Two) Times a Day., Disp: , Rfl:     cholecalciferol (VITAMIN D3) 25 MCG (1000 UT) tablet, Take 1 tablet by mouth Daily., Disp: , Rfl:     clonazePAM (KlonoPIN) 0.5 MG tablet, Take 1 tablet by mouth 2 (Two) Times a Day As Needed., Disp: , Rfl:     levothyroxine (SYNTHROID, LEVOTHROID) 112 MCG tablet, Take 1 tablet by mouth Daily., Disp: , Rfl:     metoprolol succinate XL (TOPROL-XL) 25 MG 24 hr tablet, Take 1 tablet by mouth Daily. Take with metoprolol succinate 50 mg for a total of 75 mg daily., Disp: 30 tablet, Rfl: 5    metoprolol succinate XL (TOPROL-XL) 50 MG 24 hr tablet, Take 1 tablet by mouth Daily., Disp: 90 tablet, Rfl: 2    valsartan-hydrochlorothiazide (DIOVAN-HCT) 160-12.5 MG per tablet, Take 1 tablet by mouth Daily., Disp: , Rfl:     ALLERGIES:    Allergies   Allergen Reactions    Sulfa Antibiotics Myalgia     States lowers blood sugar    Diprivan [Propofol] Angioedema    Statins Mental Status Change and Myalgia    Shellfish-Derived Products Hives and Itching         REVIEW OF SYSTEMS:    A comprehensive 14 point  "review of systems was performed.  Significant findings as mentioned above.  All other systems reviewed and are negative.        Physical Exam   Vital Signs: /79   Pulse 66   Temp 97.6 øF (36.4 øC)   Resp 18   Ht 175.3 cm (69\")   Wt 108 kg (239 lb)   SpO2 97%   BMI 35.29 kg/mý      General: Well developed, well nourished, alert and oriented x 3, in no acute distress.   Head: ATNC   Eyes: PERRL, No evidence of conjunctivitis.   Nose: No nasal discharge.   Mouth: Oral mucosal membranes moist. No oral ulceration or hemorrhages.   Neck: Neck supple. No thyromegaly. No JVD.   Lungs: Clear in all fields to A&P without rales, rhonchi or wheezing.   Heart: S1, S2. Regular rate and rhythm. No murmurs, rubs, or gallops.   Abdomen: Soft. Bowel sounds are normoactive. Nontender with palpation. No Hepatosplenomegaly can be appreciated.   Extremities: No cyanosis or edema. Peripheral pulses palpable and equal bilaterally.   Integumentary: No rash, sores, erythema or nodules. No blistering, bruising, or dry skin.   Hem/Lymph Nodes: No palpable cervical, submandibular, supraclavicular, axillary  lymphadenopathy noted. No petechiae, purpura or ecchymosis noted.   Neurologic: Grossly non-focal exam    Pain Score:  Pain Score    08/29/23 1111   PainSc: 0-No pain       PHQ-Score Total:  PHQ-9 Total Score: 0       PATHOLOGY:        ENDOSCOPY:        IMAGING:        RECENT LABS:  Lab Results   Component Value Date    WBC 13.70 (H) 07/24/2023    HGB 16.0 07/24/2023    HCT 47.3 07/24/2023    MCV 91.0 07/24/2023    RDW 13.5 07/24/2023     07/24/2023    NEUTRORELPCT 67.0 07/05/2023    LYMPHORELPCT 25.3 07/05/2023    MONORELPCT 5.7 07/05/2023    EOSRELPCT 1.3 07/05/2023    BASORELPCT 0.2 07/05/2023    NEUTROABS 9.10 (H) 07/05/2023    LYMPHSABS 3.44 (H) 07/05/2023       Lab Results   Component Value Date     07/05/2023    K 3.9 07/05/2023    CO2 24.0 07/05/2023     07/05/2023    BUN 19 07/05/2023    CREATININE " 1.14 07/05/2023    EGFRIFNONA 73 09/20/2021    GLUCOSE 139 (H) 07/05/2023    CALCIUM 9.0 07/05/2023    ALKPHOS 85 07/05/2023    AST 18 07/05/2023    ALT 26 07/05/2023    BILITOT 0.2 07/05/2023    ALBUMIN 3.8 07/05/2023    PROTEINTOT 6.3 07/05/2023    MG 2.5 (H) 07/05/2023       Lab Results   Component Value Date/Time     (H) 09/03/2021 01:09 AM         ASSESSMENT & PLAN:  Edward Rodas is a very pleasant 83 y.o. male with    1. Leukocytosis  - Previous available CBCs were reviewed and patient has had chronic elevation in WBC since at least July 2019 with fluctuation in counts ranging 11.36-30,000 with normal H/H and platelets. Of note, patient reports that his WBC count of 30,000 was during the time he was hospitalized with COVID pneumonia.  - Patient does not smoke. Clinically, he is doing well and denies any significant B-symptoms.  - Obtained repeat CBC today which showed ongoing leukocytosis with WBC (14.66), primarily neutrophils. H/H and platelets are normal.   - Discussed with patient that leukocytosis is likely related to chronic underlying inflammation.  - Sent additional labs today to further evaluate including PBS, ESR, CRP, Acute Hepatitis panel, HIV panel, JOE and RF. Also sent Flow Cytometry and BCR/ABL to evaluate for possible underlying myeloproliferative disorder.  - Will follow up in 6 weeks with repeat CBC. Will also discuss abdominal ultrasound at that time.       ACO / ANGÉLICA/Other  Quality measures  -  Edward Rodas received 2022 flu vaccine.  -  Edward Rodas reports a pain score of 0.    -  Current outpatient and discharge medications have been reconciled for the patient.  Reviewed by: LORENZO Karimi              The patient was in agreement with the plan and all questions were answered to his satisfaction.     Thank you so much for allowing us to participate in the care of Edward Rodas . Please do not hesitate to contact us with any questions or concerns.     A total of 45  minutes were spent coordinating this patient's care in clinic today; more than 50% of this time was face-to-face with the patient, reviewing his interim medical history and counseling on the current treatment and followup plan. All questions were answered to his satisfaction.      Electronically Signed by: LORENZO Spivey , August 29, 2023 11:13 EDT           Electronically Signed by: LORENZO Spivey , August 29, 2023 11:13 EDT       CC:   LORENZO Moses Shannon D., APRN

## 2023-08-30 PROBLEM — N40.0 BENIGN PROSTATIC HYPERPLASIA WITHOUT LOWER URINARY TRACT SYMPTOMS: Status: ACTIVE | Noted: 2023-08-30

## 2023-08-30 LAB
ANA SER QL: NEGATIVE
REF LAB TEST METHOD: NORMAL

## 2023-08-31 ENCOUNTER — OFFICE VISIT (OUTPATIENT)
Dept: CARDIOLOGY | Facility: CLINIC | Age: 83
End: 2023-08-31
Payer: MEDICARE

## 2023-08-31 VITALS
HEIGHT: 69 IN | SYSTOLIC BLOOD PRESSURE: 153 MMHG | RESPIRATION RATE: 16 BRPM | HEART RATE: 71 BPM | BODY MASS INDEX: 35.49 KG/M2 | DIASTOLIC BLOOD PRESSURE: 82 MMHG | OXYGEN SATURATION: 96 % | WEIGHT: 239.6 LBS

## 2023-08-31 DIAGNOSIS — I10 ESSENTIAL HYPERTENSION: ICD-10-CM

## 2023-08-31 DIAGNOSIS — I47.1 NONSUSTAINED SUPRAVENTRICULAR TACHYCARDIA: ICD-10-CM

## 2023-08-31 DIAGNOSIS — I47.29 NONSUSTAINED VENTRICULAR TACHYCARDIA: Primary | ICD-10-CM

## 2023-08-31 DIAGNOSIS — R60.0 BILATERAL LEG EDEMA: ICD-10-CM

## 2023-08-31 LAB — REF LAB TEST METHOD: NORMAL

## 2023-08-31 PROCEDURE — 3079F DIAST BP 80-89 MM HG: CPT | Performed by: INTERNAL MEDICINE

## 2023-08-31 PROCEDURE — 99214 OFFICE O/P EST MOD 30 MIN: CPT | Performed by: INTERNAL MEDICINE

## 2023-08-31 PROCEDURE — 3077F SYST BP >= 140 MM HG: CPT | Performed by: INTERNAL MEDICINE

## 2023-08-31 NOTE — LETTER
August 31, 2023       No Recipients    Patient: Edward Rodas   YOB: 1940   Date of Visit: 8/31/2023     Dear LOREZNO Wade:       Thank you for referring Edward Rodas to me for evaluation. Below are the relevant portions of my assessment and plan of care.    If you have questions, please do not hesitate to call me. I look forward to following Edward along with you.         Sincerely,        Jae Payan MD        CC:   No Recipients    Jae Payan MD  08/31/23 1853  Sign when Signing Visit  Ángela Bonilla, APRN  Edward Rodas  1940 08/31/2023    Patient Active Problem List   Diagnosis    Essential hypertension    Palpitations    Near syncope    Nonsustained supraventricular tachycardia    Nonsustained ventricular tachycardia    Abnormal nuclear stress test    Pneumonia due to COVID-19 virus    Acute hypoxemic respiratory failure due to COVID-19    Cytokine release syndrome, grade 2    Chronic heart failure with preserved ejection fraction    Pneumonia    Low testosterone    Disorder of prostate    Benign prostatic hyperplasia without lower urinary tract symptoms       Dear Ángela Bonilla, APRN:    Subjective     Edward Rodas is a 83 y.o. male with the problems as listed above, presents    Chief complaint: Follow-up of history of nonsustained supraventricular tachycardia and ventricular tachycardia.    History of Present Illness: Mr. Rodas is a pleasant 83-year-old gentleman with history of nonsustained supraventricular tachycardia and ventricular tachycardia in the past, is being maintained on metoprolol, is here for regular cardiology follow-up.  On today's visit he denies any complaints of palpitations or syncope.  He has some intermittent dizziness.  He also complains of chronic bilateral leg edema.  He denies any significant dyspnea, PND or orthopnea.  States he is under increased stress due to his wife's illness.  His recent evaluation for heart failure was  negative including a proBNP and chest x-ray.  His echo Doppler study revealed normal LV wall motion and systolic function.    Edward Rodas  Limited Transthoracic Echocardiogram  Order# [id number]  Reading physician: Jae Payan MD Ordering physician: Louann Pardo APRN Study date: 23     Patient Information    Patient Name  Edward Rodas MRN  [id number] Legal Sex  Male  (Age)  1940 (83 y.o.)   Clinical Indication  Dyspnea   Dx: Bilateral leg edema [R60.0 (ICD-10-CM)]; Dyspnea on exertion [R06.09 (ICD-10-CM)]     Interpretation Summary     This is a limited study mainly to assess the LV wall motion and systolic function.    Normal left ventricular cavity size and wall thickness noted. All left ventricular wall segments contract normally.    Left ventricular ejection fraction appears to be 61 - 65%.    There is no evidence of pericardial effusion.     Study Result    Narrative & Impression   EXAM:    XR Chest, 2 Views     EXAM DATE:    2023 2:14 PM     CLINICAL HISTORY:    shortness of breath; R06.09-Other forms of dyspnea     TECHNIQUE:    Frontal and lateral views of the chest.     COMPARISON:    2023     FINDINGS:    Lungs and pleural spaces:  Unremarkable.  No consolidation.  No  pneumothorax.    Heart:  Unremarkable.  No cardiomegaly.    Mediastinum:  Unremarkable.    Bones/joints:  Degenerative changes of thoracic spine are stable from  previous.     IMPRESSION:    No acute findings in the chest.     This report was finalized on 2023 2:38 PM by Dr. Hermann Cooper MD.        Allergies   Allergen Reactions    Sulfa Antibiotics Myalgia     States lowers blood sugar    Diprivan [Propofol] Angioedema    Statins Mental Status Change and Myalgia    Shellfish-Derived Products Hives and Itching   :    Current Outpatient Medications:     acetaminophen (TYLENOL) 500 MG tablet, Take 1 tablet by mouth Every 6 (Six) Hours As Needed for Mild Pain or Fever., Disp: , Rfl:      albuterol sulfate  (90 Base) MCG/ACT inhaler, Inhale 2 puffs Every 4 (Four) Hours As Needed for Wheezing., Disp: 8.5 g, Rfl: 0    amLODIPine (NORVASC) 10 MG tablet, Take 0.5 tablets by mouth Every Night., Disp: , Rfl:     budesonide-formoterol (SYMBICORT) 160-4.5 MCG/ACT inhaler, Inhale 2 puffs 2 (Two) Times a Day., Disp: , Rfl:     cholecalciferol (VITAMIN D3) 25 MCG (1000 UT) tablet, Take 1 tablet by mouth Daily., Disp: , Rfl:     clonazePAM (KlonoPIN) 0.5 MG tablet, Take 1 tablet by mouth 2 (Two) Times a Day As Needed., Disp: , Rfl:     levothyroxine (SYNTHROID, LEVOTHROID) 112 MCG tablet, Take 1 tablet by mouth Daily., Disp: , Rfl:     metoprolol succinate XL (TOPROL-XL) 25 MG 24 hr tablet, Take 1 tablet by mouth Daily. Take with metoprolol succinate 50 mg for a total of 75 mg daily., Disp: 30 tablet, Rfl: 5    metoprolol succinate XL (TOPROL-XL) 50 MG 24 hr tablet, Take 1 tablet by mouth Daily., Disp: 90 tablet, Rfl: 2    valsartan-hydrochlorothiazide (DIOVAN-HCT) 160-12.5 MG per tablet, Take 1 tablet by mouth Daily., Disp: , Rfl:     The following portions of the patient's history were reviewed and updated as appropriate: allergies, current medications, past family history, past medical history, past social history, past surgical history and problem list.    Social History     Tobacco Use    Smoking status: Former     Packs/day: 1.00     Types: Cigarettes     Quit date:      Years since quittin.7    Smokeless tobacco: Never   Vaping Use    Vaping Use: Never used   Substance Use Topics    Alcohol use: No    Drug use: No     Review of Systems   Constitutional: Negative for chills and fever.   HENT:  Negative for nosebleeds and sore throat.    Cardiovascular:  Positive for leg swelling.   Respiratory:  Negative for cough, hemoptysis and wheezing.    Gastrointestinal:  Negative for abdominal pain, hematemesis, hematochezia, melena, nausea and vomiting.   Genitourinary:  Negative for  "dysuria and hematuria.   Neurological:  Negative for headaches.   Objective   Vitals:    08/31/23 0949   BP: 153/82   Pulse: 71   Resp: 16   SpO2: 96%   Weight: 109 kg (239 lb 9.6 oz)   Height: 175.3 cm (69\")     Body mass index is 35.38 kg/mý.    Constitutional:       Appearance: Well-developed.   Eyes:      Conjunctiva/sclera: Conjunctivae normal.   HENT:      Head: Normocephalic.   Neck:      Thyroid: No thyromegaly.      Vascular: No JVD.      Trachea: No tracheal deviation.   Pulmonary:      Effort: No respiratory distress.      Breath sounds: Normal breath sounds. No wheezing. No rales.   Cardiovascular:      PMI at left midclavicular line. Normal rate. Regular rhythm. Normal S1. Normal S2.       Murmurs: There is no murmur.      No gallop.  No click. No rub.   Pulses:     Intact distal pulses.   Edema:     Pretibial: bilateral 1+ edema of the pretibial area.     Ankle: bilateral 1+ edema of the ankle.     Feet: bilateral 1+ edema of the feet.  Abdominal:      General: Bowel sounds are normal.      Palpations: Abdomen is soft. There is no abdominal mass.      Tenderness: There is no abdominal tenderness.   Musculoskeletal:      Cervical back: Normal range of motion and neck supple. Skin:     General: Skin is warm and dry.   Neurological:      Mental Status: Alert and oriented to person, place, and time.      Cranial Nerves: No cranial nerve deficit.       BNP  Order: [id number]  Status: Final result       Visible [facility] patient: No [facility]       Next appt: 10/10/2023 at 12:30 PM in Oncology ([facility])       Dx: Essential hypertension; Dyspnea on ex...    Specimen Information: Blood   2 Result Notes         Component  Ref Range & Units 1 mo ago 4 mo ago 8 mo ago 9 mo ago   proBNP  0.0 - 1,800.0 pg/mL 77.3 119.4 113.6 147.9   Resulting Agency  [facility]  [facility]          Among patients with dyspnea, NT-proBNP is highly sensitive for the detection of acute congestive " heart failure. In addition NT-proBNP of <300 pg/ml effectively rules out acute congestive heart failure with 99% negative predictive value.             Lab Results   Component Value Date     08/29/2023    K 3.6 08/29/2023     08/29/2023    CO2 24.5 08/29/2023    BUN 17 08/29/2023    CREATININE 1.09 08/29/2023    GLUCOSE 142 (H) 08/29/2023    CALCIUM 8.8 08/29/2023    AST 21 08/29/2023    ALT 26 08/29/2023    ALKPHOS 86 08/29/2023     No results found for: CKTOTAL  Lab Results   Component Value Date    WBC 14.66 (H) 08/29/2023    HGB 15.4 08/29/2023    HCT 47.1 08/29/2023     08/29/2023     Lab Results   Component Value Date    INR 1.03 08/03/2020    INR 0.90 02/23/2020    INR 0.94 07/20/2019     Lab Results   Component Value Date    MG 2.5 (H) 07/05/2023     Lab Results   Component Value Date    TSH 4.200 07/05/2023    PSA 6.470 (H) 07/24/2023    TRIG 112 08/08/2018    HDL 34 (L) 08/08/2018     (H) 08/08/2018      No results found for: BNP  Procedures      Assessment & Plan    Diagnosis Plan   1. Nonsustained ventricular tachycardia, clinically asymptomatic and stable.        2. Nonsustained supraventricular tachycardia, clinically asymptomatic and stable.        3. Bilateral leg edema  US Venous Doppler Lower Extremity Bilateral (duplex)      4. Essential hypertension          Recommendations  I have reviewed the results of the labs and chest x-ray and echo Doppler study with the patient.  Obtain venous Doppler of the lower extremities to rule out any remote possibility of DVT.  I told him to keep his legs propped up as much as possible to help with leg edema.      Return in about 2 months (around 10/31/2023).    As always, Ángela Bonilla, APRN  I appreciate very much the opportunity to participate in the cardiovascular care of your patients. Please do not hesitate to call me with any questions with regards to Edward Rodas's evaluation and management.       With Best  Regards,        Jae Payan MD, FACC    Please note that portions of this note were completed with a voice recognition program.

## 2023-08-31 NOTE — PROGRESS NOTES
Ángela Bonilla, APRN  Edward Rodas  2023    Patient Active Problem List   Diagnosis    Essential hypertension    Palpitations    Near syncope    Nonsustained supraventricular tachycardia    Nonsustained ventricular tachycardia    Abnormal nuclear stress test    Pneumonia due to COVID-19 virus    Acute hypoxemic respiratory failure due to COVID-19    Cytokine release syndrome, grade 2    Chronic heart failure with preserved ejection fraction    Pneumonia    Low testosterone    Disorder of prostate    Benign prostatic hyperplasia without lower urinary tract symptoms       Dear Ángela Bonilla, APRN:    Subjective     Edward Rodas is a 83 y.o. male with the problems as listed above, presents    Chief complaint: Follow-up of history of nonsustained supraventricular tachycardia and ventricular tachycardia.    History of Present Illness: Mr. Rodas is a pleasant 83-year-old gentleman with history of nonsustained supraventricular tachycardia and ventricular tachycardia in the past, is being maintained on metoprolol, is here for regular cardiology follow-up.  On today's visit he denies any complaints of palpitations or syncope.  He has some intermittent dizziness.  He also complains of chronic bilateral leg edema.  He denies any significant dyspnea, PND or orthopnea.  States he is under increased stress due to his wife's illness.  His recent evaluation for heart failure was negative including a proBNP and chest x-ray.  His echo Doppler study revealed normal LV wall motion and systolic function.    Edward Rodas  Limited Transthoracic Echocardiogram  Order# [id number]  Reading physician: Jae Payan MD Ordering physician: Louann Pardo APRN Study date: 23     Patient Information    Patient Name  Edward Rodas MRN  [id number] Legal Sex  Male  (Age)  1940 (83 y.o.)   Clinical Indication  Dyspnea   Dx: Bilateral leg edema [R60.0 (ICD-10-CM)]; Dyspnea on exertion [R06.09 (ICD-10-CM)]      Interpretation Summary     This is a limited study mainly to assess the LV wall motion and systolic function.    Normal left ventricular cavity size and wall thickness noted. All left ventricular wall segments contract normally.    Left ventricular ejection fraction appears to be 61 - 65%.    There is no evidence of pericardial effusion.     Study Result    Narrative & Impression   EXAM:    XR Chest, 2 Views     EXAM DATE:    7/5/2023 2:14 PM     CLINICAL HISTORY:    shortness of breath; R06.09-Other forms of dyspnea     TECHNIQUE:    Frontal and lateral views of the chest.     COMPARISON:    04/30/2023     FINDINGS:    Lungs and pleural spaces:  Unremarkable.  No consolidation.  No  pneumothorax.    Heart:  Unremarkable.  No cardiomegaly.    Mediastinum:  Unremarkable.    Bones/joints:  Degenerative changes of thoracic spine are stable from  previous.     IMPRESSION:    No acute findings in the chest.     This report was finalized on 7/5/2023 2:38 PM by Dr. Hermann Cooper MD.        Allergies   Allergen Reactions    Sulfa Antibiotics Myalgia     States lowers blood sugar    Diprivan [Propofol] Angioedema    Statins Mental Status Change and Myalgia    Shellfish-Derived Products Hives and Itching   :    Current Outpatient Medications:     acetaminophen (TYLENOL) 500 MG tablet, Take 1 tablet by mouth Every 6 (Six) Hours As Needed for Mild Pain or Fever., Disp: , Rfl:     albuterol sulfate  (90 Base) MCG/ACT inhaler, Inhale 2 puffs Every 4 (Four) Hours As Needed for Wheezing., Disp: 8.5 g, Rfl: 0    amLODIPine (NORVASC) 10 MG tablet, Take 0.5 tablets by mouth Every Night., Disp: , Rfl:     budesonide-formoterol (SYMBICORT) 160-4.5 MCG/ACT inhaler, Inhale 2 puffs 2 (Two) Times a Day., Disp: , Rfl:     cholecalciferol (VITAMIN D3) 25 MCG (1000 UT) tablet, Take 1 tablet by mouth Daily., Disp: , Rfl:     clonazePAM (KlonoPIN) 0.5 MG tablet, Take 1 tablet by mouth 2 (Two) Times a Day As Needed., Disp: , Rfl:      "levothyroxine (SYNTHROID, LEVOTHROID) 112 MCG tablet, Take 1 tablet by mouth Daily., Disp: , Rfl:     metoprolol succinate XL (TOPROL-XL) 25 MG 24 hr tablet, Take 1 tablet by mouth Daily. Take with metoprolol succinate 50 mg for a total of 75 mg daily., Disp: 30 tablet, Rfl: 5    metoprolol succinate XL (TOPROL-XL) 50 MG 24 hr tablet, Take 1 tablet by mouth Daily., Disp: 90 tablet, Rfl: 2    valsartan-hydrochlorothiazide (DIOVAN-HCT) 160-12.5 MG per tablet, Take 1 tablet by mouth Daily., Disp: , Rfl:     The following portions of the patient's history were reviewed and updated as appropriate: allergies, current medications, past family history, past medical history, past social history, past surgical history and problem list.    Social History     Tobacco Use    Smoking status: Former     Packs/day: 1.00     Types: Cigarettes     Quit date:      Years since quittin.7    Smokeless tobacco: Never   Vaping Use    Vaping Use: Never used   Substance Use Topics    Alcohol use: No    Drug use: No     Review of Systems   Constitutional: Negative for chills and fever.   HENT:  Negative for nosebleeds and sore throat.    Cardiovascular:  Positive for leg swelling.   Respiratory:  Negative for cough, hemoptysis and wheezing.    Gastrointestinal:  Negative for abdominal pain, hematemesis, hematochezia, melena, nausea and vomiting.   Genitourinary:  Negative for dysuria and hematuria.   Neurological:  Negative for headaches.   Objective   Vitals:    23 0949   BP: 153/82   Pulse: 71   Resp: 16   SpO2: 96%   Weight: 109 kg (239 lb 9.6 oz)   Height: 175.3 cm (69\")     Body mass index is 35.38 kg/mý.    Constitutional:       Appearance: Well-developed.   Eyes:      Conjunctiva/sclera: Conjunctivae normal.   HENT:      Head: Normocephalic.   Neck:      Thyroid: No thyromegaly.      Vascular: No JVD.      Trachea: No tracheal deviation.   Pulmonary:      Effort: No respiratory distress.      Breath sounds: Normal breath " sounds. No wheezing. No rales.   Cardiovascular:      PMI at left midclavicular line. Normal rate. Regular rhythm. Normal S1. Normal S2.       Murmurs: There is no murmur.      No gallop.  No click. No rub.   Pulses:     Intact distal pulses.   Edema:     Pretibial: bilateral 1+ edema of the pretibial area.     Ankle: bilateral 1+ edema of the ankle.     Feet: bilateral 1+ edema of the feet.  Abdominal:      General: Bowel sounds are normal.      Palpations: Abdomen is soft. There is no abdominal mass.      Tenderness: There is no abdominal tenderness.   Musculoskeletal:      Cervical back: Normal range of motion and neck supple. Skin:     General: Skin is warm and dry.   Neurological:      Mental Status: Alert and oriented to person, place, and time.      Cranial Nerves: No cranial nerve deficit.       BNP  Order: [id number]  Status: Final result       Visible to patient: No (inaccessible in MyChart)       Next appt: 10/10/2023 at 12:30 PM in Oncology ([facility])       Dx: Essential hypertension; Dyspnea on ex...    Specimen Information: Blood   2 Result Notes         Component  Ref Range & Units 1 mo ago 4 mo ago 8 mo ago 9 mo ago   proBNP  0.0 - 1,800.0 pg/mL 77.3 119.4 113.6 147.9   Resulting Agency  [facility]  [facility]          Among patients with dyspnea, NT-proBNP is highly sensitive for the detection of acute congestive heart failure. In addition NT-proBNP of <300 pg/ml effectively rules out acute congestive heart failure with 99% negative predictive value.             Lab Results   Component Value Date     08/29/2023    K 3.6 08/29/2023     08/29/2023    CO2 24.5 08/29/2023    BUN 17 08/29/2023    CREATININE 1.09 08/29/2023    GLUCOSE 142 (H) 08/29/2023    CALCIUM 8.8 08/29/2023    AST 21 08/29/2023    ALT 26 08/29/2023    ALKPHOS 86 08/29/2023     No results found for: CKTOTAL  Lab Results   Component Value Date    WBC 14.66 (H) 08/29/2023    HGB 15.4 08/29/2023     HCT 47.1 08/29/2023     08/29/2023     Lab Results   Component Value Date    INR 1.03 08/03/2020    INR 0.90 02/23/2020    INR 0.94 07/20/2019     Lab Results   Component Value Date    MG 2.5 (H) 07/05/2023     Lab Results   Component Value Date    TSH 4.200 07/05/2023    PSA 6.470 (H) 07/24/2023    TRIG 112 08/08/2018    HDL 34 (L) 08/08/2018     (H) 08/08/2018      No results found for: BNP  Procedures      Assessment & Plan    Diagnosis Plan   1. Nonsustained ventricular tachycardia, clinically asymptomatic and stable.        2. Nonsustained supraventricular tachycardia, clinically asymptomatic and stable.        3. Bilateral leg edema  US Venous Doppler Lower Extremity Bilateral (duplex)      4. Essential hypertension          Recommendations  I have reviewed the results of the labs and chest x-ray and echo Doppler study with the patient.  Obtain venous Doppler of the lower extremities to rule out any remote possibility of DVT.  I told him to keep his legs propped up as much as possible to help with leg edema.      Return in about 2 months (around 10/31/2023).    As always, Ángela Bonilla, LORENZO  I appreciate very much the opportunity to participate in the cardiovascular care of your patients. Please do not hesitate to call me with any questions with regards to Edward Rodas's evaluation and management.       With Best Regards,        Jae Payan MD, [facility]    Please note that portions of this note were completed with a voice recognition program.

## 2023-09-05 NOTE — PROGRESS NOTES
Notified patient and wife of Flow Cytometry results. Recommended CT imaging to evaluate for lymphadenopathy but patient would like to think about this and let me know.     Dianne Slade, LORENZO   09/05/2023  12:22 pm

## 2023-09-07 LAB
INTERPRETATION: NEGATIVE
LAB DIRECTOR NAME PROVIDER: NORMAL
LABORATORY COMMENT REPORT: NORMAL
REF LAB TEST METHOD: NORMAL
T(ABL1,BCR)B2A2/CONTROL BLD/T: NORMAL %
T(ABL1,BCR)B3A2/CONTROL BLD/T: NORMAL %
T(ABL1,BCR)E1A2/CONTROL BLD/T: NORMAL %

## 2023-09-21 ENCOUNTER — HOSPITAL ENCOUNTER (OUTPATIENT)
Dept: CARDIOLOGY | Facility: HOSPITAL | Age: 83
Discharge: HOME | End: 2023-09-21
Payer: MEDICARE

## 2023-09-21 DIAGNOSIS — R60.0 BILATERAL LEG EDEMA: ICD-10-CM

## 2023-09-21 PROCEDURE — 93970 EXTREMITY STUDY: CPT

## 2023-10-10 ENCOUNTER — OFFICE VISIT (OUTPATIENT)
Dept: ONCOLOGY | Facility: CLINIC | Age: 83
End: 2023-10-10
Payer: MEDICARE

## 2023-10-10 ENCOUNTER — LAB (OUTPATIENT)
Dept: ONCOLOGY | Facility: CLINIC | Age: 83
End: 2023-10-10
Payer: MEDICARE

## 2023-10-10 VITALS
TEMPERATURE: 98.2 F | BODY MASS INDEX: 35.73 KG/M2 | WEIGHT: 241.2 LBS | HEART RATE: 86 BPM | RESPIRATION RATE: 18 BRPM | SYSTOLIC BLOOD PRESSURE: 163 MMHG | OXYGEN SATURATION: 96 % | DIASTOLIC BLOOD PRESSURE: 86 MMHG | HEIGHT: 69 IN

## 2023-10-10 DIAGNOSIS — D72.829 LEUKOCYTOSIS, UNSPECIFIED TYPE: Primary | ICD-10-CM

## 2023-10-10 DIAGNOSIS — R42 DIZZINESS: ICD-10-CM

## 2023-10-10 DIAGNOSIS — C91.10 CHRONIC LYMPHOCYTIC LEUKEMIA: ICD-10-CM

## 2023-10-10 DIAGNOSIS — D72.829 LEUKOCYTOSIS, UNSPECIFIED TYPE: ICD-10-CM

## 2023-10-10 LAB
BASOPHILS # BLD AUTO: 0.04 10*3/MM3 (ref 0–0.2)
BASOPHILS NFR BLD AUTO: 0.3 % (ref 0–1.5)
DEPRECATED RDW RBC AUTO: 48.1 FL (ref 37–54)
EOSINOPHIL # BLD AUTO: 0.22 10*3/MM3 (ref 0–0.4)
EOSINOPHIL NFR BLD AUTO: 1.5 % (ref 0.3–6.2)
ERYTHROCYTE [DISTWIDTH] IN BLOOD BY AUTOMATED COUNT: 14.2 % (ref 12.3–15.4)
HCT VFR BLD AUTO: 46.2 % (ref 37.5–51)
HGB BLD-MCNC: 15.4 G/DL (ref 13–17.7)
IMM GRANULOCYTES # BLD AUTO: 0.08 10*3/MM3 (ref 0–0.05)
IMM GRANULOCYTES NFR BLD AUTO: 0.6 % (ref 0–0.5)
LYMPHOCYTES # BLD AUTO: 3.64 10*3/MM3 (ref 0.7–3.1)
LYMPHOCYTES NFR BLD AUTO: 25.2 % (ref 19.6–45.3)
MCH RBC QN AUTO: 30.7 PG (ref 26.6–33)
MCHC RBC AUTO-ENTMCNC: 33.3 G/DL (ref 31.5–35.7)
MCV RBC AUTO: 92.2 FL (ref 79–97)
MONOCYTES # BLD AUTO: 0.68 10*3/MM3 (ref 0.1–0.9)
MONOCYTES NFR BLD AUTO: 4.7 % (ref 5–12)
NEUTROPHILS NFR BLD AUTO: 67.7 % (ref 42.7–76)
NEUTROPHILS NFR BLD AUTO: 9.76 10*3/MM3 (ref 1.7–7)
NRBC BLD AUTO-RTO: 0 /100 WBC (ref 0–0.2)
PLATELET # BLD AUTO: 209 10*3/MM3 (ref 140–450)
PMV BLD AUTO: 10.3 FL (ref 6–12)
RBC # BLD AUTO: 5.01 10*6/MM3 (ref 4.14–5.8)
WBC NRBC COR # BLD: 14.42 10*3/MM3 (ref 3.4–10.8)

## 2023-10-10 PROCEDURE — 85025 COMPLETE CBC W/AUTO DIFF WBC: CPT | Performed by: NURSE PRACTITIONER

## 2023-10-10 NOTE — PROGRESS NOTES
Venipuncture Blood Specimen Collection  Venipuncture performed in left arm by Rain Waters MA with good hemostasis. Patient tolerated the procedure well without complications.   10/10/23   Rain Waters MA

## 2023-10-10 NOTE — PROGRESS NOTES
"DATE:  10/10/2023    DIAGNOSIS: CLL/SLL    CHIEF COMPLAINT:  Dizziness, Chronic Weakness/Fatigue        HISTORY OF PRESENT ILLNESS:   Edward Rodas is a very pleasant 83 y.o. male who is being seen today at the request of LORENZO Moses for evaluation and treatment of leukocytosis. Mr. Rodas reports following with his PCP every 3 months with lab testing. He reports that he has been aware of this issue for many years. Previous available CBCs were reviewed and patient has had chronic elevation in WBC since at least July 2019 with fluctuation in counts ranging 11.36-30,000 with normal H/H and platelets. Of note, patient reports that his WBC count of 30,000 was during the time he was hospitalized with COVID pneumonia. He reports that he feels like a \"83 year old male\". He reports that he tries to remain as active as possible and helps take care of his wife on a daily basis. He denies any worsening fatigue. He reports a good appetite, stable weight. Denies any fever/chills or drenching night sweats. No tender/enlarged lymph nodes. No recurrent or difficult to treat infections. No recent infections. His main complaint is dizziness. He is following with eye doctor and neurology for this. He denies any other specific complaints today.      Interval History:  Mr. Rodas presents today for follow up of leukocytosis accompanied by his daughter. Overall, he reports that he is doing well. He continues to struggle with dizziness and chronic weakness/fatigue but denies any worsening. He reports a good appetite, stable weight. Denies fever/chills or drenching night sweats. No tender/enlarged lymph nodes. He reports that he is dealing with a lot of stress at home with wife having stage 4 liver cancer and his son recently underwent kidney/liver transplant. He denies any other specific complaints today.     The following portions of the patient's history were reviewed and updated as appropriate: allergies, current medications, " past family history, past medical history, past social history, past surgical history and problem list.    PAST MEDICAL HISTORY:  Past Medical History:   Diagnosis Date    Allergic rhinitis     Asthma     Cancer     basal cell skin cancer    Chronic diastolic CHF (congestive heart failure)     Disease of thyroid gland     Dysrhythmia     GERD (gastroesophageal reflux disease)     Heart murmur     as infant    History of ventricular tachycardia     Hyperlipidemia     Hypertension     Near syncope        PAST SURGICAL HISTORY:  Past Surgical History:   Procedure Laterality Date    TONSILLECTOMY         SOCIAL HISTORY:  Social History     Socioeconomic History    Marital status:    Tobacco Use    Smoking status: Former     Packs/day: 1     Types: Cigarettes     Quit date:      Years since quittin.8    Smokeless tobacco: Never   Vaping Use    Vaping Use: Never used   Substance and Sexual Activity    Alcohol use: No    Drug use: No    Sexual activity: Defer                FAMILY HISTORY:  Family History   Problem Relation Age of Onset    Heart disease Brother          MEDICATIONS:  The current medication list was reviewed in the EMR    Current Outpatient Medications:     acetaminophen (TYLENOL) 500 MG tablet, Take 1 tablet by mouth Every 6 (Six) Hours As Needed for Mild Pain or Fever., Disp: , Rfl:     albuterol sulfate  (90 Base) MCG/ACT inhaler, Inhale 2 puffs Every 4 (Four) Hours As Needed for Wheezing., Disp: 8.5 g, Rfl: 0    amLODIPine (NORVASC) 10 MG tablet, Take 0.5 tablets by mouth Every Night., Disp: , Rfl:     budesonide-formoterol (SYMBICORT) 160-4.5 MCG/ACT inhaler, Inhale 2 puffs 2 (Two) Times a Day., Disp: , Rfl:     cholecalciferol (VITAMIN D3) 25 MCG (1000 UT) tablet, Take 1 tablet by mouth Daily., Disp: , Rfl:     clonazePAM (KlonoPIN) 0.5 MG tablet, Take 1 tablet by mouth 2 (Two) Times a Day As Needed., Disp: , Rfl:     levothyroxine (SYNTHROID, LEVOTHROID) 112 MCG tablet, Take 1  tablet by mouth Daily., Disp: , Rfl:     metoprolol succinate XL (TOPROL-XL) 25 MG 24 hr tablet, Take 1 tablet by mouth Daily. Take with metoprolol succinate 50 mg for a total of 75 mg daily., Disp: 30 tablet, Rfl: 5    metoprolol succinate XL (TOPROL-XL) 50 MG 24 hr tablet, Take 1 tablet by mouth Daily., Disp: 90 tablet, Rfl: 2    valsartan-hydrochlorothiazide (DIOVAN-HCT) 160-12.5 MG per tablet, Take 1 tablet by mouth Daily., Disp: , Rfl:     ALLERGIES:    Allergies   Allergen Reactions    Sulfa Antibiotics Myalgia     States lowers blood sugar    Diprivan [Propofol] Angioedema    Statins Mental Status Change and Myalgia    Shellfish-Derived Products Hives and Itching         REVIEW OF SYSTEMS:    A comprehensive 14 point review of systems was performed.  Significant findings as mentioned above.  All other systems reviewed and are negative.        Physical Exam   Vital Signs:   Vitals:    10/10/23 1252   BP: 163/86   Pulse: 86   Resp: 18   Temp: 98.2 øF (36.8 øC)   SpO2: 96%        ECOG score: 0   General: Well developed, well nourished, alert and oriented x 3, in no acute distress.   Head: ATNC   Eyes: PERRL, No evidence of conjunctivitis.   Nose: No nasal discharge.   Mouth: Oral mucosal membranes moist. No oral ulceration or hemorrhages.   Neck: Neck supple. No thyromegaly. No JVD.   Lungs: Clear in all fields to A&P without rales, rhonchi or wheezing.   Heart: S1, S2. Regular rate and rhythm. No murmurs, rubs, or gallops.   Abdomen: Soft. Bowel sounds are normoactive. Nontender with palpation. No Hepatosplenomegaly can be appreciated.   Extremities: No clubbing, cyanosis or edema bilaterally.   Integumentary: No rash, sores, erythema or nodules. No blistering, bruising, or dry skin.   Lymph Nodes: No palpable cervical, submandibular, supraclavicular, axillary or inguinal lymphadenopathy noted. No petechiae, purpura or ecchymosis noted.   Neurologic: Grossly non-focal exam.    Pain Score:  Pain Score     10/10/23 1252   PainSc: 0-No pain               IMAGING:        RECENT LABS:  Lab Results   Component Value Date    WBC 14.66 (H) 08/29/2023    HGB 15.4 08/29/2023    HCT 47.1 08/29/2023    MCV 93.1 08/29/2023    RDW 14.1 08/29/2023     08/29/2023    NEUTRORELPCT 65.9 08/29/2023    LYMPHORELPCT 26.5 08/29/2023    MONORELPCT 5.2 08/29/2023    EOSRELPCT 1.6 08/29/2023    BASORELPCT 0.3 08/29/2023    NEUTROABS 9.66 (H) 08/29/2023    LYMPHSABS 3.88 (H) 08/29/2023       Lab Results   Component Value Date     08/29/2023    K 3.6 08/29/2023    CO2 24.5 08/29/2023     08/29/2023    BUN 17 08/29/2023    CREATININE 1.09 08/29/2023    EGFRIFNONA 73 09/20/2021    GLUCOSE 142 (H) 08/29/2023    CALCIUM 8.8 08/29/2023    ALKPHOS 86 08/29/2023    AST 21 08/29/2023    ALT 26 08/29/2023    BILITOT 0.4 08/29/2023    ALBUMIN 3.9 08/29/2023    PROTEINTOT 6.8 08/29/2023    MG 2.5 (H) 07/05/2023       Lab Results   Component Value Date     (H) 09/03/2021       Lab Results   Component Value Date    FERRITIN 830.00 (H) 08/30/2021      Work Up 08/29/2023      Lab Results   Component Value Date    SEDRATE 17 08/29/2023    SEDRATE 16 11/24/2022     Lab Results   Component Value Date    CRP 0.55 (H) 08/29/2023    CRP 4.77 (H) 11/26/2022    CRP 4.35 (H) 11/25/2022    CRP <0.30 11/24/2022    CRP 0.32 09/02/2021    CRP 0.47 09/01/2021    CRP 0.65 (H) 08/31/2021    CRP 0.74 (H) 08/30/2021    CRP 1.02 (H) 08/29/2021    CRP 1.67 (H) 08/28/2021    CRP 3.09 (H) 08/27/2021    CRP 4.97 (H) 08/26/2021    CRP 8.91 (H) 08/25/2021    CRP 9.82 (H) 08/24/2021     Hepatitis B Surface Ag  Non-Reactive Non-Reactive   Hep A IgM  Non-Reactive Non-Reactive   Hep B C IgM  Non-Reactive Non-Reactive   Hepatitis C Ab  Non-Reactive Non-Reactive     HIV-1/ HIV-2  Non-Reactive Non-Reactive     JOE Direct  Negative Negative     Rheumatoid Factor Quantitative  0.0 - 14.0 IU/mL <10.0         BCR/ABL: Negative        ASSESSMENT & PLAN:  Edward Rodas  is a very pleasant 83 y.o. male with    1.  CLL/SLL  - Previous available CBCs were reviewed and patient has had chronic elevation in WBC since at least July 2019 with fluctuation in counts ranging 11.36-30,000 with normal H/H and platelets. Of note, patient reports that his WBC count of 30,000 was during the time he was hospitalized with COVID pneumonia.  - Patient does not smoke. Clinically, he is doing well. Other than chronic fatigue, he denies any significant B-symptoms.  - CBC from initial consultation showed ongoing leukocytosis with WBC (14.66), primarily neutrophils. H/H and platelets are normal. PBS was obtained and showed neutrophilic leukocytosis without significant left shift, mild absolute lymphocytosis, no abnormal/immature white blood cells, normochromic, normocytic red blood cells without anemia, platelets present in adequate numbers with normal appearance. ESR was normal. CRP was mildly elevated and suggestive of chronic underlying inflammation. Acute Hepatitis panel and HIV panel non-reactive. JOE and RF and was normal. Also sent BCR/ABL which was negative. However, Flow Cytometry showed Clonal B-cell population with CLL/SLL like phenotype accounting for approximately 23% of events. BCR/ABL was negative.   - Discussed with patient and daughter in clinic regarding this diagnosis and its prognosis. He is aware that this disease is the most common form of leukemia in his age range (60-81 yo) and that, while it is uncurable, it is also often a very indolent process. In the absence of associated cytopenias , B-symptoms (otherwise unexplained fevers, chills, night sweats and or weight loss) or other symptoms that can be directly attributed to CLL, bulky adenopathy and/or rapid doubling of the white count, treatment is not indicated (and may continue to not be indicated for quite some time, possibly for the rest of his life).   - Repeat CBC from today showing ongoing leukocytosis (14.42) with normal  Hg/Hct and platelets. Other than fatigue, clinically he is doing well without any significant B-symptoms. Recommended baseline imaging including CT neck, chest, abdomen/pelvis with contrast and patient declines at this time. Therefore, will continue conservative follow up in 6 months with repeat labs including CBC, CMP, LDH and Uric Acid.    2. Dizziness  - He has been following with eye doctor and neurology. There has been no reason for dizziness determined by either group. He is requesting referral to ENT for further evaluation. Will place referral today.   - Also advised to monitor and log blood pressure sitting and standing and to follow up with PCP.       ACO / ANGÉLICA/Other  Quality measures  -  Edward Rodas did not receive 2023 flu vaccine.  This was recommended but declined.  -  Edward Rodas reports a pain score of 0.    -  Current outpatient and discharge medications have been reconciled for the patient.  Reviewed by: LORENZO Karimi                A total of 45 minutes were spent coordinating this patient's care in clinic today; more than 50% of this time was face-to-face with the patient, reviewing his interim medical history and counseling on the current treatment and followup plan. All questions were answered to his satisfaction.          Electronically Signed by: LORENZO Spivey APRN October 10, 2023 12:53 EDT       CC:   No ref. provider found  Ángela Bonilla APRN

## 2023-12-14 DIAGNOSIS — I47.29 NONSUSTAINED VENTRICULAR TACHYCARDIA: ICD-10-CM

## 2023-12-14 DIAGNOSIS — I47.10 NONSUSTAINED SUPRAVENTRICULAR TACHYCARDIA: ICD-10-CM

## 2023-12-14 DIAGNOSIS — I10 ESSENTIAL HYPERTENSION: ICD-10-CM

## 2023-12-14 RX ORDER — METOPROLOL SUCCINATE 25 MG/1
TABLET, EXTENDED RELEASE ORAL
Qty: 30 TABLET | Refills: 5 | Status: SHIPPED | OUTPATIENT
Start: 2023-12-14

## 2024-02-15 ENCOUNTER — OFFICE VISIT (OUTPATIENT)
Dept: CARDIOLOGY | Facility: CLINIC | Age: 84
End: 2024-02-15
Payer: MEDICARE

## 2024-02-15 VITALS
WEIGHT: 236.4 LBS | HEIGHT: 69 IN | OXYGEN SATURATION: 94 % | DIASTOLIC BLOOD PRESSURE: 82 MMHG | HEART RATE: 69 BPM | RESPIRATION RATE: 16 BRPM | BODY MASS INDEX: 35.01 KG/M2 | SYSTOLIC BLOOD PRESSURE: 143 MMHG

## 2024-02-15 DIAGNOSIS — I10 ESSENTIAL HYPERTENSION: ICD-10-CM

## 2024-02-15 DIAGNOSIS — I47.29 NONSUSTAINED VENTRICULAR TACHYCARDIA: Primary | ICD-10-CM

## 2024-02-15 DIAGNOSIS — I47.10 NONSUSTAINED SUPRAVENTRICULAR TACHYCARDIA: ICD-10-CM

## 2024-02-15 PROCEDURE — 93000 ELECTROCARDIOGRAM COMPLETE: CPT | Performed by: INTERNAL MEDICINE

## 2024-02-15 PROCEDURE — 99213 OFFICE O/P EST LOW 20 MIN: CPT | Performed by: INTERNAL MEDICINE

## 2024-02-15 RX ORDER — METOPROLOL SUCCINATE 50 MG/1
50 TABLET, EXTENDED RELEASE ORAL DAILY
Qty: 90 TABLET | Refills: 2 | Status: SHIPPED | OUTPATIENT
Start: 2024-02-15

## 2024-02-15 RX ORDER — METOPROLOL SUCCINATE 25 MG/1
25 TABLET, EXTENDED RELEASE ORAL DAILY
Qty: 90 TABLET | Refills: 2 | Status: SHIPPED | OUTPATIENT
Start: 2024-02-15

## 2024-04-15 ENCOUNTER — TELEPHONE (OUTPATIENT)
Dept: ONCOLOGY | Facility: CLINIC | Age: 84
End: 2024-04-15
Payer: MEDICARE

## 2024-04-15 NOTE — TELEPHONE ENCOUNTER
Front office was contacted by the hub reporting a patient had called to reschedule his appointment and had stated he was having a difficult time finding his words. When the hub was trying to transfer call to RN, phone call was disconnected.     RN, then, called the patient to obtain more information. He reported feeling very stressed and has gone through a lot since the passing of his wife back in December. He said while he was on the phone with his son earlier, it was very hard to him to find his words but has since improved. He reported this has happened before and he went to ED and was told that it was likely due to stress. Patient reports feeling anxious currently and that he was going to take his medication and try to rest for a while. RN voiced understanding and informed him of the signs and symptoms of stroke that would warrant a visit to the emergency room. RN informed patient that stress can cause the body various side effects, but it always better to get checked out just in case. He verbalized understanding and stated that if it were to happen again or he feels worse, he would go to ED.     RN stated I will inform front office staff to reschedule his appointment for today and they will call when that has been done. He voiced understanding and appreciation for the call.     Provider, LORENZO Dean made aware.

## 2024-04-29 ENCOUNTER — LAB (OUTPATIENT)
Dept: ONCOLOGY | Facility: CLINIC | Age: 84
End: 2024-04-29
Payer: MEDICARE

## 2024-04-29 ENCOUNTER — OFFICE VISIT (OUTPATIENT)
Dept: ONCOLOGY | Facility: CLINIC | Age: 84
End: 2024-04-29
Payer: MEDICARE

## 2024-04-29 VITALS
OXYGEN SATURATION: 95 % | HEART RATE: 68 BPM | RESPIRATION RATE: 18 BRPM | TEMPERATURE: 98 F | BODY MASS INDEX: 34.05 KG/M2 | WEIGHT: 230.6 LBS | DIASTOLIC BLOOD PRESSURE: 78 MMHG | SYSTOLIC BLOOD PRESSURE: 157 MMHG

## 2024-04-29 DIAGNOSIS — C91.10 CHRONIC LYMPHOCYTIC LEUKEMIA: ICD-10-CM

## 2024-04-29 DIAGNOSIS — C91.10 CLL (CHRONIC LYMPHOCYTIC LEUKEMIA): Primary | ICD-10-CM

## 2024-04-29 DIAGNOSIS — R42 DIZZINESS: ICD-10-CM

## 2024-04-29 DIAGNOSIS — D72.829 LEUKOCYTOSIS, UNSPECIFIED TYPE: ICD-10-CM

## 2024-04-29 LAB
ALBUMIN SERPL-MCNC: 3.9 G/DL (ref 3.5–5.2)
ALBUMIN/GLOB SERPL: 1.2 G/DL
ALP SERPL-CCNC: 92 U/L (ref 39–117)
ALT SERPL W P-5'-P-CCNC: 20 U/L (ref 1–41)
ANION GAP SERPL CALCULATED.3IONS-SCNC: 10.3 MMOL/L (ref 5–15)
AST SERPL-CCNC: 18 U/L (ref 1–40)
BASOPHILS # BLD AUTO: 0.03 10*3/MM3 (ref 0–0.2)
BASOPHILS NFR BLD AUTO: 0.2 % (ref 0–1.5)
BILIRUB SERPL-MCNC: 0.3 MG/DL (ref 0–1.2)
BUN SERPL-MCNC: 15 MG/DL (ref 8–23)
BUN/CREAT SERPL: 12.1 (ref 7–25)
CALCIUM SPEC-SCNC: 9.1 MG/DL (ref 8.6–10.5)
CHLORIDE SERPL-SCNC: 104 MMOL/L (ref 98–107)
CO2 SERPL-SCNC: 26.7 MMOL/L (ref 22–29)
CREAT SERPL-MCNC: 1.24 MG/DL (ref 0.76–1.27)
DEPRECATED RDW RBC AUTO: 49 FL (ref 37–54)
EGFRCR SERPLBLD CKD-EPI 2021: 56.6 ML/MIN/1.73
EOSINOPHIL # BLD AUTO: 0.22 10*3/MM3 (ref 0–0.4)
EOSINOPHIL NFR BLD AUTO: 1.3 % (ref 0.3–6.2)
ERYTHROCYTE [DISTWIDTH] IN BLOOD BY AUTOMATED COUNT: 14.6 % (ref 12.3–15.4)
GLOBULIN UR ELPH-MCNC: 3.2 GM/DL
GLUCOSE SERPL-MCNC: 138 MG/DL (ref 65–99)
HCT VFR BLD AUTO: 45.9 % (ref 37.5–51)
HGB BLD-MCNC: 15.3 G/DL (ref 13–17.7)
IMM GRANULOCYTES # BLD AUTO: 0.06 10*3/MM3 (ref 0–0.05)
IMM GRANULOCYTES NFR BLD AUTO: 0.4 % (ref 0–0.5)
LDH SERPL-CCNC: 258 U/L (ref 135–225)
LYMPHOCYTES # BLD AUTO: 5.23 10*3/MM3 (ref 0.7–3.1)
LYMPHOCYTES NFR BLD AUTO: 32.1 % (ref 19.6–45.3)
MCH RBC QN AUTO: 30.7 PG (ref 26.6–33)
MCHC RBC AUTO-ENTMCNC: 33.3 G/DL (ref 31.5–35.7)
MCV RBC AUTO: 92 FL (ref 79–97)
MONOCYTES # BLD AUTO: 0.75 10*3/MM3 (ref 0.1–0.9)
MONOCYTES NFR BLD AUTO: 4.6 % (ref 5–12)
NEUTROPHILS NFR BLD AUTO: 10.02 10*3/MM3 (ref 1.7–7)
NEUTROPHILS NFR BLD AUTO: 61.4 % (ref 42.7–76)
NRBC BLD AUTO-RTO: 0 /100 WBC (ref 0–0.2)
PLATELET # BLD AUTO: 220 10*3/MM3 (ref 140–450)
PMV BLD AUTO: 10.2 FL (ref 6–12)
POTASSIUM SERPL-SCNC: 3.7 MMOL/L (ref 3.5–5.2)
PROT SERPL-MCNC: 7.1 G/DL (ref 6–8.5)
RBC # BLD AUTO: 4.99 10*6/MM3 (ref 4.14–5.8)
SODIUM SERPL-SCNC: 141 MMOL/L (ref 136–145)
URATE SERPL-MCNC: 6.4 MG/DL (ref 3.4–7)
WBC NRBC COR # BLD AUTO: 16.31 10*3/MM3 (ref 3.4–10.8)

## 2024-04-29 PROCEDURE — 1159F MED LIST DOCD IN RCRD: CPT | Performed by: NURSE PRACTITIONER

## 2024-04-29 PROCEDURE — 80053 COMPREHEN METABOLIC PANEL: CPT | Performed by: NURSE PRACTITIONER

## 2024-04-29 PROCEDURE — 1160F RVW MEDS BY RX/DR IN RCRD: CPT | Performed by: NURSE PRACTITIONER

## 2024-04-29 PROCEDURE — 85025 COMPLETE CBC W/AUTO DIFF WBC: CPT | Performed by: NURSE PRACTITIONER

## 2024-04-29 PROCEDURE — 99214 OFFICE O/P EST MOD 30 MIN: CPT | Performed by: NURSE PRACTITIONER

## 2024-04-29 PROCEDURE — 1126F AMNT PAIN NOTED NONE PRSNT: CPT | Performed by: NURSE PRACTITIONER

## 2024-04-29 PROCEDURE — 83615 LACTATE (LD) (LDH) ENZYME: CPT | Performed by: NURSE PRACTITIONER

## 2024-04-29 PROCEDURE — 84550 ASSAY OF BLOOD/URIC ACID: CPT | Performed by: NURSE PRACTITIONER

## 2024-04-29 NOTE — PROGRESS NOTES
Venipuncture Blood Specimen Collection  Venipuncture performed in right arm by Rain Waters MA with good hemostasis. Patient tolerated the procedure well without complications.   04/29/24   Rain Waters MA

## 2024-04-29 NOTE — PROGRESS NOTES
"DATE:  4/29/2024    DIAGNOSIS: CLL/SLL    CHIEF COMPLAINT:  Dizziness        HISTORY OF PRESENT ILLNESS:   Edward Rodas is a very pleasant 84 y.o. male who is being seen today at the request of LORENZO Moses for evaluation and treatment of leukocytosis. Mr. Rodas reports following with his PCP every 3 months with lab testing. He reports that he has been aware of this issue for many years. Previous available CBCs were reviewed and patient has had chronic elevation in WBC since at least July 2019 with fluctuation in counts ranging 11.36-30,000 with normal H/H and platelets. Of note, patient reports that his WBC count of 30,000 was during the time he was hospitalized with COVID pneumonia. He reports that he feels like a \"83 year old male\". He reports that he tries to remain as active as possible and helps take care of his wife on a daily basis. He denies any worsening fatigue. He reports a good appetite, stable weight. Denies any fever/chills or drenching night sweats. No tender/enlarged lymph nodes. No recurrent or difficult to treat infections. No recent infections. His main complaint is dizziness. He is following with eye doctor and neurology for this. He denies any other specific complaints today.      Interval History:  Mr. Rodas presents today for follow up of leukocytosis. He reports that since his last visit he lost his wife due to liver cancer. He states that he continues to struggle with that grief daily. He also reports ongoing dizziness and has started following with a chiropractor. He reports a good appetite, stable weight. He is trying to cut back due to more elevated HgB A1C. Denies fever/chills or drenching night sweats. No tender/enlarged lymph nodes. He denies any other specific complaints today.     The following portions of the patient's history were reviewed and updated as appropriate: allergies, current medications, past family history, past medical history, past social history, past " surgical history and problem list.    PAST MEDICAL HISTORY:  Past Medical History:   Diagnosis Date    Allergic rhinitis     Asthma     Cancer     basal cell skin cancer    Chronic diastolic CHF (congestive heart failure)     Disease of thyroid gland     Dysrhythmia     GERD (gastroesophageal reflux disease)     Heart murmur     as infant    History of ventricular tachycardia     Hyperlipidemia     Hypertension     Near syncope        PAST SURGICAL HISTORY:  Past Surgical History:   Procedure Laterality Date    TONSILLECTOMY         SOCIAL HISTORY:  Social History     Socioeconomic History    Marital status:    Tobacco Use    Smoking status: Former     Current packs/day: 0.00     Types: Cigarettes     Quit date:      Years since quittin.3    Smokeless tobacco: Never   Vaping Use    Vaping status: Never Used   Substance and Sexual Activity    Alcohol use: No    Drug use: No    Sexual activity: Defer                FAMILY HISTORY:  Family History   Problem Relation Age of Onset    Heart disease Brother          MEDICATIONS:  The current medication list was reviewed in the EMR    Current Outpatient Medications:     acetaminophen (TYLENOL) 500 MG tablet, Take 1 tablet by mouth Every 6 (Six) Hours As Needed for Mild Pain or Fever., Disp: , Rfl:     albuterol sulfate  (90 Base) MCG/ACT inhaler, Inhale 2 puffs Every 4 (Four) Hours As Needed for Wheezing., Disp: 8.5 g, Rfl: 0    amLODIPine (NORVASC) 10 MG tablet, Take 0.5 tablets by mouth Every Night., Disp: , Rfl:     budesonide-formoterol (SYMBICORT) 160-4.5 MCG/ACT inhaler, Inhale 2 puffs 2 (Two) Times a Day., Disp: , Rfl:     cholecalciferol (VITAMIN D3) 25 MCG (1000 UT) tablet, Take 1 tablet by mouth Daily., Disp: , Rfl:     clonazePAM (KlonoPIN) 0.5 MG tablet, Take 0.5 tablets by mouth 2 (Two) Times a Day As Needed., Disp: , Rfl:     levothyroxine (SYNTHROID, LEVOTHROID) 112 MCG tablet, Take 1 tablet by mouth Daily., Disp: , Rfl:     metoprolol  succinate XL (TOPROL-XL) 25 MG 24 hr tablet, Take 1 tablet by mouth Daily. Take a 50 mg tablet and 25 mg tablet to make it 75 mg daily., Disp: 90 tablet, Rfl: 2    metoprolol succinate XL (TOPROL-XL) 50 MG 24 hr tablet, Take 1 tablet by mouth Daily., Disp: 90 tablet, Rfl: 2    valsartan-hydrochlorothiazide (DIOVAN-HCT) 160-12.5 MG per tablet, Take 1 tablet by mouth Daily., Disp: , Rfl:     ALLERGIES:    Allergies   Allergen Reactions    Sulfa Antibiotics Myalgia     States lowers blood sugar    Diprivan [Propofol] Angioedema    Statins Mental Status Change and Myalgia    Shellfish-Derived Products Hives and Itching         REVIEW OF SYSTEMS:    A comprehensive 14 point review of systems was performed.  Significant findings as mentioned above.  All other systems reviewed and are negative.        Physical Exam   Vital Signs:   Vitals:    04/29/24 1410   BP: 157/78   Pulse: 68   Resp: 18   Temp: 98 °F (36.7 °C)   SpO2: 95%        ECOG score: 0   General: Well developed, well nourished, alert and oriented x 3, in no acute distress.   Head: ATNC   Eyes: PERRL, No evidence of conjunctivitis.   Nose: No nasal discharge.   Mouth: Oral mucosal membranes moist. No oral ulceration or hemorrhages.   Neck: Neck supple. No thyromegaly. No JVD.   Lungs: Clear in all fields to A&P without rales, rhonchi or wheezing.   Heart: S1, S2. Regular rate and rhythm. No murmurs, rubs, or gallops.   Abdomen: Soft. Bowel sounds are normoactive. Nontender with palpation. No Hepatosplenomegaly can be appreciated.   Extremities: No clubbing, cyanosis or edema bilaterally.   Integumentary: No rash, sores, erythema or nodules. No blistering, bruising, or dry skin.   Lymph Nodes: No palpable cervical, submandibular, supraclavicular, axillary lymphadenopathy noted. No petechiae, purpura or ecchymosis noted.   Neurologic: Grossly non-focal exam.    Pain Score:  Pain Score    04/29/24 1410   PainSc: 0-No pain               IMAGING:        RECENT  LABS:  Lab Results   Component Value Date    WBC 14.42 (H) 10/10/2023    HGB 15.4 10/10/2023    HCT 46.2 10/10/2023    MCV 92.2 10/10/2023    RDW 14.2 10/10/2023     10/10/2023    NEUTRORELPCT 67.7 10/10/2023    LYMPHORELPCT 25.2 10/10/2023    MONORELPCT 4.7 (L) 10/10/2023    EOSRELPCT 1.5 10/10/2023    BASORELPCT 0.3 10/10/2023    NEUTROABS 9.76 (H) 10/10/2023    LYMPHSABS 3.64 (H) 10/10/2023       Lab Results   Component Value Date     08/29/2023    K 3.6 08/29/2023    CO2 24.5 08/29/2023     08/29/2023    BUN 17 08/29/2023    CREATININE 1.09 08/29/2023    EGFRIFNONA 73 09/20/2021    GLUCOSE 142 (H) 08/29/2023    CALCIUM 8.8 08/29/2023    ALKPHOS 86 08/29/2023    AST 21 08/29/2023    ALT 26 08/29/2023    BILITOT 0.4 08/29/2023    ALBUMIN 3.9 08/29/2023    PROTEINTOT 6.8 08/29/2023    MG 2.5 (H) 07/05/2023       Lab Results   Component Value Date     (H) 09/03/2021       Lab Results   Component Value Date    FERRITIN 830.00 (H) 08/30/2021      Work Up 08/29/2023      Lab Results   Component Value Date    SEDRATE 17 08/29/2023    SEDRATE 16 11/24/2022     Lab Results   Component Value Date    CRP 0.55 (H) 08/29/2023    CRP 4.77 (H) 11/26/2022    CRP 4.35 (H) 11/25/2022    CRP <0.30 11/24/2022    CRP 0.32 09/02/2021    CRP 0.47 09/01/2021    CRP 0.65 (H) 08/31/2021    CRP 0.74 (H) 08/30/2021    CRP 1.02 (H) 08/29/2021    CRP 1.67 (H) 08/28/2021    CRP 3.09 (H) 08/27/2021    CRP 4.97 (H) 08/26/2021    CRP 8.91 (H) 08/25/2021    CRP 9.82 (H) 08/24/2021     Hepatitis B Surface Ag  Non-Reactive Non-Reactive   Hep A IgM  Non-Reactive Non-Reactive   Hep B C IgM  Non-Reactive Non-Reactive   Hepatitis C Ab  Non-Reactive Non-Reactive     HIV-1/ HIV-2  Non-Reactive Non-Reactive     JOE Direct  Negative Negative     Rheumatoid Factor Quantitative  0.0 - 14.0 IU/mL <10.0         BCR/ABL: Negative        ASSESSMENT & PLAN:  Edward Rodas is a very pleasant 84 y.o. male with    1.  CLL/SLL  - Previous  available CBCs were reviewed and patient has had chronic elevation in WBC since at least July 2019 with fluctuation in counts ranging 11.36-30,000 with normal H/H and platelets. Of note, patient reports that his WBC count of 30,000 was during the time he was hospitalized with COVID pneumonia.  - Patient does not smoke. Clinically, he is doing well. Other than chronic fatigue, he denies any significant B-symptoms.  - CBC from initial consultation showed ongoing leukocytosis with WBC (14.66), primarily neutrophils. H/H and platelets are normal. PBS was obtained and showed neutrophilic leukocytosis without significant left shift, mild absolute lymphocytosis, no abnormal/immature white blood cells, normochromic, normocytic red blood cells without anemia, platelets present in adequate numbers with normal appearance. ESR was normal. CRP was mildly elevated and suggestive of chronic underlying inflammation. Acute Hepatitis panel and HIV panel non-reactive. JOE and RF and was normal. Also sent BCR/ABL which was negative. However, Flow Cytometry showed Clonal B-cell population with CLL/SLL like phenotype accounting for approximately 23% of events. BCR/ABL was negative.   - Repeat CBC from today showing ongoing leukocytosis (16.31) with normal Hg/Hct and platelets. CMP shows mildly elevated BG, of note he is not fasting. LDH is slightly elevated with normal Uric Acid.  - Clinically, he is doing well and is without any significant B-symptoms. Recommended baseline imaging including CT neck, chest, abdomen/pelvis with contrast and patient declines at this time. Therefore, will continue conservative follow up in 6 months with repeat labs including CBC, CMP, LDH and Uric Acid.    2. Dizziness  - He has been following with eye doctor and neurology. There has been no reason for dizziness determined by either group. He is requesting referral to ENT for further evaluation. He did not keep this appointment due to death of his wife. He  does not want to reschedule at this time   - He is now following with chiropractor for ? Vertigo. He believes the adjustment he is receiving has been helpful.       ACO / ANGÉLICA/Other  Quality measures  -  Edward Rodas did not receive 2023 flu vaccine.  This was recommended.  -  Edward Rodas reports a pain score of 0.   -  Current outpatient and discharge medications have been reconciled for the patient.  Reviewed by: LORENZO Karimi      I spent 30 minutes caring for Edward on this date of service. This time includes time spent by me in the following activities: preparing for the visit, reviewing tests, obtaining and/or reviewing a separately obtained history, performing a medically appropriate examination and/or evaluation, counseling and educating the patient/family/caregiver, ordering medications, tests, or procedures, documenting information in the medical record, independently interpreting results and communicating that information with the patient/family/caregiver, and care coordination.                                             Electronically Signed by: LORENZO Spivey APRN April 29, 2024 14:12 EDT       CC:   No ref. provider found  Ángela Bonilla APRN

## 2024-07-16 ENCOUNTER — OFFICE VISIT (OUTPATIENT)
Dept: CARDIOLOGY | Facility: CLINIC | Age: 84
End: 2024-07-16
Payer: MEDICARE

## 2024-07-16 VITALS
WEIGHT: 230 LBS | DIASTOLIC BLOOD PRESSURE: 75 MMHG | HEIGHT: 69 IN | BODY MASS INDEX: 34.07 KG/M2 | OXYGEN SATURATION: 94 % | SYSTOLIC BLOOD PRESSURE: 144 MMHG | RESPIRATION RATE: 18 BRPM | HEART RATE: 64 BPM

## 2024-07-16 DIAGNOSIS — I47.10 NONSUSTAINED SUPRAVENTRICULAR TACHYCARDIA: ICD-10-CM

## 2024-07-16 DIAGNOSIS — I47.29 NONSUSTAINED VENTRICULAR TACHYCARDIA: Primary | ICD-10-CM

## 2024-07-16 PROCEDURE — 1159F MED LIST DOCD IN RCRD: CPT | Performed by: NURSE PRACTITIONER

## 2024-07-16 PROCEDURE — 99213 OFFICE O/P EST LOW 20 MIN: CPT | Performed by: NURSE PRACTITIONER

## 2024-07-16 PROCEDURE — 1160F RVW MEDS BY RX/DR IN RCRD: CPT | Performed by: NURSE PRACTITIONER

## 2024-07-16 RX ORDER — AMLODIPINE BESYLATE 5 MG/1
1 TABLET ORAL DAILY
COMMUNITY
Start: 2024-06-17

## 2024-07-16 NOTE — PROGRESS NOTES
Ángela Bonilla, APRN  Edward Rodas  1940 07/16/2024    Patient Active Problem List   Diagnosis    Essential hypertension    Palpitations    Near syncope    Nonsustained supraventricular tachycardia    Nonsustained ventricular tachycardia    Abnormal nuclear stress test    Pneumonia due to COVID-19 virus    Acute hypoxemic respiratory failure due to COVID-19    Cytokine release syndrome, grade 2    Chronic heart failure with preserved ejection fraction    Pneumonia    Low testosterone    Disorder of prostate    Benign prostatic hyperplasia without lower urinary tract symptoms       Dear Ángela Bonilla, APRN:    Subjective     Chief Complaint   Patient presents with    Med Management     List             History of Present Illness:    Edward Rodas is a 84 y.o. male with a past medical history of nonsustained ventricular tachycardia and SVT in the past.  He presents today for cardiology follow-up.  He denies any chest pains, shortness of breath, or palpitations.  Reports PCP has obtained routine labs recently.  These are not available for my review.          Allergies   Allergen Reactions    Sulfa Antibiotics Myalgia     States lowers blood sugar    Diprivan [Propofol] Angioedema    Statins Mental Status Change and Myalgia    Shellfish-Derived Products Hives and Itching   :      Current Outpatient Medications:     acetaminophen (TYLENOL) 500 MG tablet, Take 1 tablet by mouth Every 6 (Six) Hours As Needed for Mild Pain or Fever., Disp: , Rfl:     albuterol sulfate  (90 Base) MCG/ACT inhaler, Inhale 2 puffs Every 4 (Four) Hours As Needed for Wheezing., Disp: 8.5 g, Rfl: 0    amLODIPine (NORVASC) 5 MG tablet, Take 1 tablet by mouth Daily., Disp: , Rfl:     budesonide-formoterol (SYMBICORT) 160-4.5 MCG/ACT inhaler, Inhale 2 puffs 2 (Two) Times a Day., Disp: , Rfl:     cholecalciferol (VITAMIN D3) 25 MCG (1000 UT) tablet, Take 1 tablet by mouth Daily., Disp: , Rfl:     clonazePAM (KlonoPIN) 0.5 MG tablet,  "Take 0.5 tablets by mouth 2 (Two) Times a Day As Needed., Disp: , Rfl:     levothyroxine (SYNTHROID, LEVOTHROID) 112 MCG tablet, Take 1 tablet by mouth Daily., Disp: , Rfl:     metoprolol succinate XL (TOPROL-XL) 25 MG 24 hr tablet, Take 1 tablet by mouth Daily. Take a 50 mg tablet and 25 mg tablet to make it 75 mg daily., Disp: 90 tablet, Rfl: 2    metoprolol succinate XL (TOPROL-XL) 50 MG 24 hr tablet, Take 1 tablet by mouth Daily., Disp: 90 tablet, Rfl: 2    valsartan-hydrochlorothiazide (DIOVAN-HCT) 160-12.5 MG per tablet, Take 1 tablet by mouth Daily., Disp: , Rfl:       The following portions of the patient's history were reviewed and updated as appropriate: allergies, current medications, past family history, past medical history, past social history, past surgical history and problem list.    Social History     Tobacco Use    Smoking status: Former     Current packs/day: 0.00     Types: Cigarettes     Quit date:      Years since quittin.5    Smokeless tobacco: Never   Vaping Use    Vaping status: Never Used   Substance Use Topics    Alcohol use: No    Drug use: No       Review of Systems   Constitutional: Negative for decreased appetite and malaise/fatigue.   Cardiovascular:  Negative for chest pain, dyspnea on exertion and palpitations.   Respiratory:  Negative for cough and shortness of breath.        Objective   Vitals:    24 1329   BP: 144/75   BP Location: Left arm   Patient Position: Sitting   Cuff Size: Large Adult   Pulse: 64   Resp: 18   SpO2: 94%   Weight: 104 kg (230 lb)   Height: 175.3 cm (69.02\")     Body mass index is 33.95 kg/m².        Vitals reviewed.   Constitutional:       Appearance: Healthy appearance. Well-developed and not in distress.   HENT:      Head: Normocephalic and atraumatic.   Pulmonary:      Effort: Pulmonary effort is normal.      Breath sounds: Normal breath sounds. No wheezing. No rales.   Cardiovascular:      Normal rate. Regular rhythm.      Murmurs: There " is no murmur.      . No S3 and S4 gallop.   Edema:     Peripheral edema absent.   Abdominal:      General: Bowel sounds are normal.      Palpations: Abdomen is soft.   Skin:     General: Skin is warm and dry.   Neurological:      Mental Status: Alert, oriented to person, place, and time and oriented to person, place and time.   Psychiatric:         Mood and Affect: Mood normal.         Behavior: Behavior normal.         Lab Results   Component Value Date     04/29/2024    K 3.7 04/29/2024     04/29/2024    CO2 26.7 04/29/2024    BUN 15 04/29/2024    CREATININE 1.24 04/29/2024    GLUCOSE 138 (H) 04/29/2024    CALCIUM 9.1 04/29/2024    AST 18 04/29/2024    ALT 20 04/29/2024    ALKPHOS 92 04/29/2024     Lab Results   Component Value Date    WBC 16.31 (H) 04/29/2024    HGB 15.3 04/29/2024    HCT 45.9 04/29/2024     04/29/2024     Lab Results   Component Value Date    TSH 4.200 07/05/2023    PSA 6.470 (H) 07/24/2023    TRIG 112 08/08/2018    HDL 34 (L) 08/08/2018     (H) 08/08/2018          Results for orders placed during the hospital encounter of 07/26/23    Adult Transthoracic Echo Limited W/ Cont if Necessary Per Protocol    Interpretation Summary    This is a limited study mainly to assess the LV wall motion and systolic function.    Normal left ventricular cavity size and wall thickness noted. All left ventricular wall segments contract normally.    Left ventricular ejection fraction appears to be 61 - 65%.    There is no evidence of pericardial effusion.     Results for orders placed during the hospital encounter of 01/03/23    Stress test with myocardial perfusion one day    Interpretation Summary    A pharmacological stress test was performed using regadenoson without low-level exercise.    Findings consistent with a normal ECG stress test.    Myocardial perfusion imaging indicates a normal myocardial perfusion study with no evidence of ischemia.    Normal LV cavity size. Normal LV wall  motion noted    Left ventricular ejection fraction is normal (Calculated EF = 61%).    Impressions are consistent with a low risk study.           Procedures      Assessment & Plan       Diagnosis Plan   1. Nonsustained ventricular tachycardia, clinically asymptomatic and stable.  Asymptomatic.  Continue metoprolol      2. Nonsustained supraventricular tachycardia, clinically asymptomatic and stable.  Asymptomatic.  Continue metoprolol        Body mass index is 33.95 kg/m².    Return in about 5 months (around 12/16/2024) for Recheck.

## 2024-10-22 ENCOUNTER — LAB (OUTPATIENT)
Dept: ONCOLOGY | Facility: CLINIC | Age: 84
End: 2024-10-22
Payer: MEDICARE

## 2024-10-22 ENCOUNTER — OFFICE VISIT (OUTPATIENT)
Dept: ONCOLOGY | Facility: CLINIC | Age: 84
End: 2024-10-22
Payer: MEDICARE

## 2024-10-22 VITALS
RESPIRATION RATE: 20 BRPM | TEMPERATURE: 98 F | HEIGHT: 69 IN | BODY MASS INDEX: 33.24 KG/M2 | OXYGEN SATURATION: 96 % | WEIGHT: 224.4 LBS | DIASTOLIC BLOOD PRESSURE: 82 MMHG | HEART RATE: 74 BPM | SYSTOLIC BLOOD PRESSURE: 164 MMHG

## 2024-10-22 DIAGNOSIS — C91.10 CLL (CHRONIC LYMPHOCYTIC LEUKEMIA): ICD-10-CM

## 2024-10-22 DIAGNOSIS — R42 DIZZINESS: ICD-10-CM

## 2024-10-22 DIAGNOSIS — C91.10 CLL (CHRONIC LYMPHOCYTIC LEUKEMIA): Primary | ICD-10-CM

## 2024-10-22 LAB
ALBUMIN SERPL-MCNC: 4.1 G/DL (ref 3.5–5.2)
ALBUMIN/GLOB SERPL: 1.4 G/DL
ALP SERPL-CCNC: 88 U/L (ref 39–117)
ALT SERPL W P-5'-P-CCNC: 17 U/L (ref 1–41)
ANION GAP SERPL CALCULATED.3IONS-SCNC: 8.5 MMOL/L (ref 5–15)
AST SERPL-CCNC: 17 U/L (ref 1–40)
BILIRUB SERPL-MCNC: 0.4 MG/DL (ref 0–1.2)
BUN SERPL-MCNC: 16 MG/DL (ref 8–23)
BUN/CREAT SERPL: 12.9 (ref 7–25)
CALCIUM SPEC-SCNC: 9.2 MG/DL (ref 8.6–10.5)
CHLORIDE SERPL-SCNC: 102 MMOL/L (ref 98–107)
CO2 SERPL-SCNC: 28.5 MMOL/L (ref 22–29)
CREAT SERPL-MCNC: 1.24 MG/DL (ref 0.76–1.27)
DEPRECATED RDW RBC AUTO: 48.5 FL (ref 37–54)
EGFRCR SERPLBLD CKD-EPI 2021: 56.6 ML/MIN/1.73
EOSINOPHIL # BLD MANUAL: 0.5 10*3/MM3 (ref 0–0.4)
EOSINOPHIL NFR BLD MANUAL: 3 % (ref 0.3–6.2)
ERYTHROCYTE [DISTWIDTH] IN BLOOD BY AUTOMATED COUNT: 14.4 % (ref 12.3–15.4)
GLOBULIN UR ELPH-MCNC: 3 GM/DL
GLUCOSE SERPL-MCNC: 117 MG/DL (ref 65–99)
HCT VFR BLD AUTO: 46.9 % (ref 37.5–51)
HGB BLD-MCNC: 15.3 G/DL (ref 13–17.7)
LDH SERPL-CCNC: 178 U/L (ref 135–225)
LYMPHOCYTES # BLD MANUAL: 8.41 10*3/MM3 (ref 0.7–3.1)
LYMPHOCYTES NFR BLD MANUAL: 4 % (ref 5–12)
MCH RBC QN AUTO: 30.4 PG (ref 26.6–33)
MCHC RBC AUTO-ENTMCNC: 32.6 G/DL (ref 31.5–35.7)
MCV RBC AUTO: 93.2 FL (ref 79–97)
MONOCYTES # BLD: 0.67 10*3/MM3 (ref 0.1–0.9)
NEUTROPHILS # BLD AUTO: 7.23 10*3/MM3 (ref 1.7–7)
NEUTROPHILS NFR BLD MANUAL: 43 % (ref 42.7–76)
PLATELET # BLD AUTO: 218 10*3/MM3 (ref 140–450)
PMV BLD AUTO: 10.2 FL (ref 6–12)
POTASSIUM SERPL-SCNC: 3.8 MMOL/L (ref 3.5–5.2)
PROT SERPL-MCNC: 7.1 G/DL (ref 6–8.5)
RBC # BLD AUTO: 5.03 10*6/MM3 (ref 4.14–5.8)
RBC MORPH BLD: NORMAL
SMALL PLATELETS BLD QL SMEAR: ADEQUATE
SODIUM SERPL-SCNC: 139 MMOL/L (ref 136–145)
URATE SERPL-MCNC: 6.6 MG/DL (ref 3.4–7)
VARIANT LYMPHS NFR BLD MANUAL: 50 % (ref 19.6–45.3)
WBC NRBC COR # BLD AUTO: 16.81 10*3/MM3 (ref 3.4–10.8)

## 2024-10-22 PROCEDURE — 83615 LACTATE (LD) (LDH) ENZYME: CPT | Performed by: NURSE PRACTITIONER

## 2024-10-22 PROCEDURE — 99214 OFFICE O/P EST MOD 30 MIN: CPT | Performed by: NURSE PRACTITIONER

## 2024-10-22 PROCEDURE — 1126F AMNT PAIN NOTED NONE PRSNT: CPT | Performed by: NURSE PRACTITIONER

## 2024-10-22 PROCEDURE — 85007 BL SMEAR W/DIFF WBC COUNT: CPT | Performed by: NURSE PRACTITIONER

## 2024-10-22 PROCEDURE — 1160F RVW MEDS BY RX/DR IN RCRD: CPT | Performed by: NURSE PRACTITIONER

## 2024-10-22 PROCEDURE — 1159F MED LIST DOCD IN RCRD: CPT | Performed by: NURSE PRACTITIONER

## 2024-10-22 PROCEDURE — 80053 COMPREHEN METABOLIC PANEL: CPT | Performed by: NURSE PRACTITIONER

## 2024-10-22 PROCEDURE — 84550 ASSAY OF BLOOD/URIC ACID: CPT | Performed by: NURSE PRACTITIONER

## 2024-10-22 PROCEDURE — 85025 COMPLETE CBC W/AUTO DIFF WBC: CPT | Performed by: NURSE PRACTITIONER

## 2024-10-22 NOTE — PROGRESS NOTES
Venipuncture Blood Specimen Collection  Venipuncture performed in left arm by Anabelle Bowman MA with good hemostasis. Patient tolerated the procedure well without complications.   10/22/24   Anabelle Bowman MA

## 2024-10-22 NOTE — PROGRESS NOTES
"DATE:  10/22/2024    DIAGNOSIS: CLL/SLL    CHIEF COMPLAINT:  Dizziness        HISTORY OF PRESENT ILLNESS:   Edward Rodas is a very pleasant 84 y.o. male who is being seen today at the request of LORENZO Moses for evaluation and treatment of leukocytosis. Mr. Rodas reports following with his PCP every 3 months with lab testing. He reports that he has been aware of this issue for many years. Previous available CBCs were reviewed and patient has had chronic elevation in WBC since at least July 2019 with fluctuation in counts ranging 11.36-30,000 with normal H/H and platelets. Of note, patient reports that his WBC count of 30,000 was during the time he was hospitalized with COVID pneumonia. He reports that he feels like a \"83 year old male\". He reports that he tries to remain as active as possible and helps take care of his wife on a daily basis. He denies any worsening fatigue. He reports a good appetite, stable weight. Denies any fever/chills or drenching night sweats. No tender/enlarged lymph nodes. No recurrent or difficult to treat infections. No recent infections. His main complaint is dizziness. He is following with eye doctor and neurology for this. He denies any other specific complaints today.      Interval History:  Mr. Rodas presents today for follow up of leukocytosis. Overall, he reports that he has been doing well since his last visit. He states that he is dealing with the loss of his wife much better at this time. He reports a good appetite, stable weight. Denies fever/chills or drenching night sweats. No tender/enlarged lymph nodes He continues to report ongoing dizziness and is no longer following with chiropractor. He is following with dermatology and is planning to have some places removed that are concerning for skin cancer. He denies any other specific complaints today.     The following portions of the patient's history were reviewed and updated as appropriate: allergies, current " medications, past family history, past medical history, past social history, past surgical history and problem list.    PAST MEDICAL HISTORY:  Past Medical History:   Diagnosis Date    Allergic rhinitis     Asthma     Cancer     basal cell skin cancer    Chronic diastolic CHF (congestive heart failure)     Disease of thyroid gland     Dysrhythmia     GERD (gastroesophageal reflux disease)     Heart murmur     as infant    History of ventricular tachycardia     Hyperlipidemia     Hypertension     Near syncope        PAST SURGICAL HISTORY:  Past Surgical History:   Procedure Laterality Date    TONSILLECTOMY         SOCIAL HISTORY:  Social History     Socioeconomic History    Marital status:    Tobacco Use    Smoking status: Former     Current packs/day: 0.00     Types: Cigarettes     Quit date:      Years since quittin.8    Smokeless tobacco: Never   Vaping Use    Vaping status: Never Used   Substance and Sexual Activity    Alcohol use: No    Drug use: No    Sexual activity: Defer                FAMILY HISTORY:  Family History   Problem Relation Age of Onset    Heart disease Brother          MEDICATIONS:  The current medication list was reviewed in the EMR    Current Outpatient Medications:     acetaminophen (TYLENOL) 500 MG tablet, Take 1 tablet by mouth Every 6 (Six) Hours As Needed for Mild Pain or Fever., Disp: , Rfl:     albuterol sulfate  (90 Base) MCG/ACT inhaler, Inhale 2 puffs Every 4 (Four) Hours As Needed for Wheezing., Disp: 8.5 g, Rfl: 0    amLODIPine (NORVASC) 5 MG tablet, Take 1 tablet by mouth Daily., Disp: , Rfl:     budesonide-formoterol (SYMBICORT) 160-4.5 MCG/ACT inhaler, Inhale 2 puffs 2 (Two) Times a Day., Disp: , Rfl:     levothyroxine (SYNTHROID, LEVOTHROID) 112 MCG tablet, Take 1 tablet by mouth Daily., Disp: , Rfl:     metoprolol succinate XL (TOPROL-XL) 25 MG 24 hr tablet, Take 1 tablet by mouth Daily. Take a 50 mg tablet and 25 mg tablet to make it 75 mg daily., Disp:  90 tablet, Rfl: 2    metoprolol succinate XL (TOPROL-XL) 50 MG 24 hr tablet, Take 1 tablet by mouth Daily., Disp: 90 tablet, Rfl: 2    valsartan-hydrochlorothiazide (DIOVAN-HCT) 160-12.5 MG per tablet, Take 1 tablet by mouth Daily., Disp: , Rfl:     cholecalciferol (VITAMIN D3) 25 MCG (1000 UT) tablet, Take 1 tablet by mouth Daily. (Patient not taking: Reported on 10/22/2024), Disp: , Rfl:     clonazePAM (KlonoPIN) 0.5 MG tablet, Take 0.5 tablets by mouth 2 (Two) Times a Day As Needed. (Patient not taking: Reported on 10/22/2024), Disp: , Rfl:     ALLERGIES:    Allergies   Allergen Reactions    Sulfa Antibiotics Myalgia     States lowers blood sugar    Diprivan [Propofol] Angioedema    Statins Mental Status Change and Myalgia    Shellfish-Derived Products Hives and Itching         REVIEW OF SYSTEMS:    A comprehensive 14 point review of systems was performed.  Significant findings as mentioned above.  All other systems reviewed and are negative.        Physical Exam   Vital Signs:   Vitals:    10/22/24 1328   BP: 164/82   Pulse: 74   Resp: 20   Temp: 98 °F (36.7 °C)   SpO2: 96%          ECOG score: 0   General: Well developed, well nourished, alert and oriented x 3, in no acute distress.   Head: ATNC   Eyes: PERRL, No evidence of conjunctivitis.   Nose: No nasal discharge.   Mouth: Oral mucosal membranes moist. No oral ulceration or hemorrhages.   Neck: Neck supple. No thyromegaly. No JVD.   Lungs: Clear in all fields to A&P without rales, rhonchi or wheezing.   Heart: S1, S2. Regular rate and rhythm. No murmurs, rubs, or gallops.   Abdomen: Soft. Bowel sounds are normoactive. Nontender with palpation. No Hepatosplenomegaly can be appreciated.   Extremities: No clubbing, cyanosis or edema bilaterally.   Integumentary: No rash, sores, erythema or nodules. No blistering, bruising, or dry skin.   Lymph Nodes: No palpable cervical, submandibular, supraclavicular, axillary lymphadenopathy noted. No petechiae, purpura or  ecchymosis noted.   Neurologic: Grossly non-focal exam.    Pain Score:  Pain Score    10/22/24 1328   PainSc: 0-No pain                 IMAGING:        RECENT LABS:  Lab Results   Component Value Date    WBC 16.31 (H) 04/29/2024    HGB 15.3 04/29/2024    HCT 45.9 04/29/2024    MCV 92.0 04/29/2024    RDW 14.6 04/29/2024     04/29/2024    NEUTRORELPCT 61.4 04/29/2024    LYMPHORELPCT 32.1 04/29/2024    MONORELPCT 4.6 (L) 04/29/2024    EOSRELPCT 1.3 04/29/2024    BASORELPCT 0.2 04/29/2024    NEUTROABS 10.02 (H) 04/29/2024    LYMPHSABS 5.23 (H) 04/29/2024       Lab Results   Component Value Date     10/22/2024    K 3.8 10/22/2024    CO2 28.5 10/22/2024     10/22/2024    BUN 16 10/22/2024    CREATININE 1.24 10/22/2024    EGFRIFNONA 73 09/20/2021    GLUCOSE 117 (H) 10/22/2024    CALCIUM 9.2 10/22/2024    ALKPHOS 88 10/22/2024    AST 17 10/22/2024    ALT 17 10/22/2024    BILITOT 0.4 10/22/2024    ALBUMIN 4.1 10/22/2024    PROTEINTOT 7.1 10/22/2024    MG 2.5 (H) 07/05/2023       Lab Results   Component Value Date     10/22/2024    URICACID 6.6 10/22/2024       Lab Results   Component Value Date    FERRITIN 830.00 (H) 08/30/2021      Work Up 08/29/2023      Lab Results   Component Value Date    SEDRATE 17 08/29/2023    SEDRATE 16 11/24/2022     Lab Results   Component Value Date    CRP 0.55 (H) 08/29/2023    CRP 4.77 (H) 11/26/2022    CRP 4.35 (H) 11/25/2022    CRP <0.30 11/24/2022    CRP 0.32 09/02/2021    CRP 0.47 09/01/2021    CRP 0.65 (H) 08/31/2021    CRP 0.74 (H) 08/30/2021    CRP 1.02 (H) 08/29/2021    CRP 1.67 (H) 08/28/2021    CRP 3.09 (H) 08/27/2021    CRP 4.97 (H) 08/26/2021    CRP 8.91 (H) 08/25/2021    CRP 9.82 (H) 08/24/2021     Hepatitis B Surface Ag  Non-Reactive Non-Reactive   Hep A IgM  Non-Reactive Non-Reactive   Hep B C IgM  Non-Reactive Non-Reactive   Hepatitis C Ab  Non-Reactive Non-Reactive     HIV-1/ HIV-2  Non-Reactive Non-Reactive     JOE Direct  Negative Negative      Rheumatoid Factor Quantitative  0.0 - 14.0 IU/mL <10.0         BCR/ABL: Negative        ASSESSMENT & PLAN:  Edward Rodas is a very pleasant 84 y.o. male with    1.  CLL/SLL  - Previous available CBCs were reviewed and patient has had chronic elevation in WBC since at least July 2019 with fluctuation in counts ranging 11.36-30,000 with normal H/H and platelets. Of note, patient reports that his WBC count of 30,000 was during the time he was hospitalized with COVID pneumonia.  - Patient does not smoke. Clinically, he is doing well. Other than chronic fatigue, he denies any significant B-symptoms.  - CBC from initial consultation showed ongoing leukocytosis with WBC (14.66), primarily neutrophils. H/H and platelets are normal. PBS was obtained and showed neutrophilic leukocytosis without significant left shift, mild absolute lymphocytosis, no abnormal/immature white blood cells, normochromic, normocytic red blood cells without anemia, platelets present in adequate numbers with normal appearance. ESR was normal. CRP was mildly elevated and suggestive of chronic underlying inflammation. Acute Hepatitis panel and HIV panel non-reactive. JOE and RF and was normal. Also sent BCR/ABL which was negative. However, Flow Cytometry showed Clonal B-cell population with CLL/SLL like phenotype accounting for approximately 23% of events. BCR/ABL was negative.   - Repeat CBC from today showing ongoing leukocytosis (16.81) with normal Hg/Hct and platelets. CMP shows mildly elevated BG, of note he is not fasting. LDH and Uric Acid are normal.  - Clinically, he is doing well and is without any significant B-symptoms. Recommended baseline imaging including CT neck, chest, abdomen/pelvis with contrast and patient declines at this time. Therefore, will continue conservative follow up in 6 months with repeat labs including CBC, CMP, LDH and Uric Acid.    2. Dizziness  - He has been following with eye doctor and neurology. There has been  no reason for dizziness determined by either group. He previously requested referral to ENT for further evaluation. He did not keep this appointment due to death of his wife. He does not want to reschedule at this time   - He is now following with chiropractor for ? Vertigo. He believes the adjustment he is receiving has been helpful. No longer following with chiropractor. Recommended to continue to follow with PCP.      ACO / ANGÉLICA/Other  Quality measures  -  Edward Rodas did not receive 2024 flu vaccine.  This was recommended.  -  Edward Rodas reports a pain score of 0.    -  Current outpatient and discharge medications have been reconciled for the patient.  Reviewed by: LORENZO Karimi              I spent 30 minutes caring for Edward on this date of service. This time includes time spent by me in the following activities: preparing for the visit, reviewing tests, obtaining and/or reviewing a separately obtained history, performing a medically appropriate examination and/or evaluation, counseling and educating the patient/family/caregiver, ordering medications, tests, or procedures, documenting information in the medical record, independently interpreting results and communicating that information with the patient/family/caregiver, and care coordination.                                             Electronically Signed by: LORENZO Spivey APRN October 22, 2024 14:34 EDT       CC:   No ref. provider found  Ángela Bonilla APRN

## 2024-12-12 ENCOUNTER — TELEPHONE (OUTPATIENT)
Dept: CARDIOLOGY | Facility: CLINIC | Age: 84
End: 2024-12-12

## 2024-12-16 DIAGNOSIS — I47.29 NONSUSTAINED VENTRICULAR TACHYCARDIA: ICD-10-CM

## 2024-12-16 DIAGNOSIS — I10 ESSENTIAL HYPERTENSION: ICD-10-CM

## 2024-12-17 RX ORDER — METOPROLOL SUCCINATE 50 MG/1
50 TABLET, EXTENDED RELEASE ORAL DAILY
Qty: 90 TABLET | Refills: 2 | Status: SHIPPED | OUTPATIENT
Start: 2024-12-17

## 2025-01-21 DIAGNOSIS — I47.10 NONSUSTAINED SUPRAVENTRICULAR TACHYCARDIA: ICD-10-CM

## 2025-01-21 DIAGNOSIS — I47.29 NONSUSTAINED VENTRICULAR TACHYCARDIA: ICD-10-CM

## 2025-01-21 DIAGNOSIS — I10 ESSENTIAL HYPERTENSION: ICD-10-CM

## 2025-01-21 RX ORDER — METOPROLOL SUCCINATE 25 MG/1
25 TABLET, EXTENDED RELEASE ORAL DAILY
Qty: 90 TABLET | Refills: 2 | Status: SHIPPED | OUTPATIENT
Start: 2025-01-21 | End: 2025-01-27 | Stop reason: DRUGHIGH

## 2025-01-21 NOTE — TELEPHONE ENCOUNTER
Caller: Edward Rodas    Relationship: Self    Best call back number: [phone]    Requested Prescriptions:   Requested Prescriptions     Pending Prescriptions Disp Refills    metoprolol succinate XL (TOPROL-XL) 25 MG 24 hr tablet 90 tablet 2     Sig: Take 1 tablet by mouth Daily. Take a 50 mg tablet and 25 mg tablet to make it 75 mg daily.        Pharmacy where request should be sent: [facility] [id number] [address] AT [address] [place] [address] [phone] [facility] [phone] FX     Last office visit with prescribing clinician: 2/15/2024   Last telemedicine visit with prescribing clinician: Visit date not found   Next office visit with prescribing clinician: Visit date not found       Does the patient have less than a 3 day supply:  [x] Yes  [] No    Would you like a call back once the refill request has been completed: [x] Yes [] No    If the office needs to give you a call back, can they leave a voicemail: [] Yes [x] No    Amber Betancourt Rep   01/21/25 10:44 EST

## 2025-01-27 ENCOUNTER — OFFICE VISIT (OUTPATIENT)
Dept: CARDIOLOGY | Facility: CLINIC | Age: 85
End: 2025-01-27
Payer: MEDICARE

## 2025-01-27 VITALS
HEART RATE: 69 BPM | HEIGHT: 69 IN | WEIGHT: 228.2 LBS | SYSTOLIC BLOOD PRESSURE: 142 MMHG | BODY MASS INDEX: 33.8 KG/M2 | RESPIRATION RATE: 18 BRPM | DIASTOLIC BLOOD PRESSURE: 78 MMHG | OXYGEN SATURATION: 95 %

## 2025-01-27 DIAGNOSIS — I47.10 NONSUSTAINED SUPRAVENTRICULAR TACHYCARDIA: ICD-10-CM

## 2025-01-27 DIAGNOSIS — I47.29 NONSUSTAINED VENTRICULAR TACHYCARDIA: Primary | ICD-10-CM

## 2025-01-27 DIAGNOSIS — I10 ESSENTIAL HYPERTENSION: ICD-10-CM

## 2025-01-27 PROCEDURE — 99214 OFFICE O/P EST MOD 30 MIN: CPT | Performed by: INTERNAL MEDICINE

## 2025-01-27 PROCEDURE — 93000 ELECTROCARDIOGRAM COMPLETE: CPT | Performed by: INTERNAL MEDICINE

## 2025-01-27 RX ORDER — METOPROLOL SUCCINATE 50 MG/1
100 TABLET, EXTENDED RELEASE ORAL DAILY
Qty: 180 TABLET | Refills: 2 | Status: SHIPPED | OUTPATIENT
Start: 2025-01-27

## 2025-01-27 RX ORDER — METOPROLOL SUCCINATE 50 MG/1
100 TABLET, EXTENDED RELEASE ORAL DAILY
Qty: 180 TABLET | Refills: 2 | Status: SHIPPED | OUTPATIENT
Start: 2025-01-27 | End: 2025-01-27 | Stop reason: SDUPTHER

## 2025-01-27 NOTE — PROGRESS NOTES
Ángela Bonilla, APRN  Edward Rodas  1940 01/27/2025    Patient Active Problem List   Diagnosis    Essential hypertension    Palpitations    Near syncope    Nonsustained supraventricular tachycardia    Nonsustained ventricular tachycardia    Abnormal nuclear stress test    Pneumonia due to COVID-19 virus    Acute hypoxemic respiratory failure due to COVID-19    Cytokine release syndrome, grade 2    Chronic heart failure with preserved ejection fraction    Pneumonia    Low testosterone    Disorder of prostate    Benign prostatic hyperplasia without lower urinary tract symptoms       Dear Ángela Bonilla, LORENZO:    Subjective     Edward Rodas is a 84 y.o. male with the problems as listed above, presents    Chief Complaint   Patient presents with    Nonsustained ventricular tachycardia       History of Present Illness: Mr. Edward Rodas is a pleasant 84-year-old gentleman with history of nonsustained ventricular tachycardia and supravalve tachycardia noted on the Holter monitor in the past.  He has been on metoprolol succinate 75 mg daily and seems to be doing well.  On today's visit he denies any complaints of palpitations, dizziness or syncope.  He denies any chest pains or shortness of breath and overall feels pretty good.    Limited Transthoracic Echocardiogram    Accession Number: [id number]   Date of Study: 7/26/23   Ordering Provider: Louann Pardo APRN   Clinical Indications: Dyspnea        [facility]  Performing Staff   Cardiology: Jae Payan MD    Tech: Kirsty Olson          Show images for Adult Transthoracic Echo Limited W/ Cont if Necessary Per Protocol   Clinical Indication    Dyspnea   Dx: Bilateral leg edema [R60.0 (ICD-10-CM)]; Dyspnea on exertion [R06.09 (ICD-10-CM)]     Interpretation Summary     This is a limited study mainly to assess the LV wall motion and systolic function.    Normal left ventricular cavity size and wall thickness noted. All left ventricular wall  segments contract normally.    Left ventricular ejection fraction appears to be 61 - 65%.    There is no evidence of pericardial effusion.       Allergies   Allergen Reactions    Sulfa Antibiotics Myalgia     States lowers blood sugar    Diprivan [Propofol] Angioedema    Statins Mental Status Change and Myalgia    Shellfish-Derived Products Hives and Itching   :    Current Outpatient Medications:     acetaminophen (TYLENOL) 500 MG tablet, Take 1 tablet by mouth Every 6 (Six) Hours As Needed for Mild Pain or Fever., Disp: , Rfl:     albuterol sulfate  (90 Base) MCG/ACT inhaler, Inhale 2 puffs Every 4 (Four) Hours As Needed for Wheezing., Disp: 8.5 g, Rfl: 0    amLODIPine (NORVASC) 5 MG tablet, Take 1 tablet by mouth Daily., Disp: , Rfl:     budesonide-formoterol (SYMBICORT) 160-4.5 MCG/ACT inhaler, Inhale 2 puffs 2 (Two) Times a Day., Disp: , Rfl:     levothyroxine (SYNTHROID, LEVOTHROID) 112 MCG tablet, Take 1 tablet by mouth Daily., Disp: , Rfl:     metoprolol succinate XL (TOPROL-XL) 50 MG 24 hr tablet, Take 2 tablets by mouth Daily., Disp: 180 tablet, Rfl: 2    valsartan-hydrochlorothiazide (DIOVAN-HCT) 160-12.5 MG per tablet, Take 1 tablet by mouth Daily., Disp: , Rfl:     The following portions of the patient's history were reviewed and updated as appropriate: allergies, current medications, past family history, past medical history, past social history, past surgical history and problem list.    Social History     Tobacco Use    Smoking status: Former     Current packs/day: 0.00     Types: Cigarettes     Quit date:      Years since quittin.1    Smokeless tobacco: Never   Vaping Use    Vaping status: Never Used   Substance Use Topics    Alcohol use: No    Drug use: No     Review of Systems   Constitutional: Negative for chills and fever.   HENT:  Negative for nosebleeds and sore throat.    Respiratory:  Negative for cough, hemoptysis and wheezing.    Gastrointestinal:  Negative for abdominal  "pain, hematemesis, hematochezia, melena, nausea and vomiting.   Genitourinary:  Negative for dysuria and hematuria.   Neurological:  Negative for headaches.   All other systems reviewed and are negative.    Objective   Vitals:    01/27/25 1307   BP: 142/78   BP Location: Left arm   Patient Position: Sitting   Cuff Size: Adult   Pulse: 69   Resp: 18   SpO2: 95%   Weight: 104 kg (228 lb 3.2 oz)   Height: 175.3 cm (69\")     Body mass index is 33.7 kg/m².    Vitals reviewed.   Constitutional:       Appearance: Well-developed.   Eyes:      Conjunctiva/sclera: Conjunctivae normal.   HENT:      Head: Normocephalic.   Neck:      Thyroid: No thyromegaly.      Vascular: No JVD.      Trachea: No tracheal deviation.   Pulmonary:      Effort: No respiratory distress.      Breath sounds: Normal breath sounds. No wheezing. No rales.   Cardiovascular:      PMI at left midclavicular line. Normal rate. Regular rhythm. Normal S1. Normal S2.       Murmurs: There is no murmur.      No gallop.  No click. No rub.   Pulses:     Intact distal pulses.   Edema:     Peripheral edema absent.   Abdominal:      General: Bowel sounds are normal.      Palpations: Abdomen is soft. There is no abdominal mass.      Tenderness: There is no abdominal tenderness.   Musculoskeletal:      Cervical back: Normal range of motion and neck supple. Skin:     General: Skin is warm and dry.   Neurological:      Mental Status: Alert and oriented to person, place, and time.      Cranial Nerves: No cranial nerve deficit.       Lab Results   Component Value Date     10/22/2024    K 3.8 10/22/2024     10/22/2024    CO2 28.5 10/22/2024    BUN 16 10/22/2024    CREATININE 1.24 10/22/2024    GLUCOSE 117 (H) 10/22/2024    CALCIUM 9.2 10/22/2024    AST 17 10/22/2024    ALT 17 10/22/2024    ALKPHOS 88 10/22/2024     Lab Results   Component Value Date    WBC 16.81 (H) 10/22/2024    HGB 15.3 10/22/2024    HCT 46.9 10/22/2024     10/22/2024     Lab Results "   Component Value Date    INR 1.03 08/03/2020    INR 0.90 02/23/2020    INR 0.94 07/20/2019     Lab Results   Component Value Date    MG 2.5 (H) 07/05/2023     Lab Results   Component Value Date    TSH 4.200 07/05/2023    PSA 6.470 (H) 07/24/2023    TRIG 112 08/08/2018    HDL 34 (L) 08/08/2018     (H) 08/08/2018          ECG 12 Lead    Date/Time: 1/27/2025 1:15 PM  Performed by: Jae Payan MD    Authorized by: Jae Payan MD  Comparison: compared with previous ECG from 2/18/2024  Rhythm: sinus rhythm  Conduction: conduction normal  ST Segments: ST segments normal  T Waves: T waves normal    Clinical impression: normal ECG            Assessment & Plan    Diagnosis Plan   1. Nonsustained ventricular tachycardia, clinically asymptomatic and stable.        2. Nonsustained supraventricular tachycardia, clinically asymptomatic and stable.        3. Essential hypertension with elevated systolic BPs.         Recommendations  Will increase the metoprolol succinate dose to 100 mg daily as he says his blood pressure at home tends to run higher than 140s on the top frequently.  I have asked her to keep a check on her blood pressure at home twice a day and bring it with next visit    Return in about 3 months (around 4/27/2025).    As always,  Ángela  I appreciate very much the opportunity to participate in the cardiovascular care of your patients. Please do not hesitate to call me with any questions with regards to Edward Rodas's evaluation and management.       With Best Regards,        Jae Payan MD, [facility]    Please note that portions of this note were completed with a voice recognition program.

## 2025-01-27 NOTE — LETTER
January 27, 2025     LORENZO Wade  [address]  [place] [address]    Patient: Edward Rodas   YOB: 1940   Date of Visit: 1/27/2025     Dear LORENZO Wade:       Thank you for referring Edward Rodas to me for evaluation. Below are the relevant portions of my assessment and plan of care.    If you have questions, please do not hesitate to call me. I look forward to following Edward along with you.         Sincerely,        Jae Payan MD        CC: No Recipients    Jae Payan MD  01/27/25 1913  Sign when Signing Visit  Ángela Bonilla APRN  Edward Rodas  1940 01/27/2025    Patient Active Problem List   Diagnosis   • Essential hypertension   • Palpitations   • Near syncope   • Nonsustained supraventricular tachycardia   • Nonsustained ventricular tachycardia   • Abnormal nuclear stress test   • Pneumonia due to COVID-19 virus   • Acute hypoxemic respiratory failure due to COVID-19   • Cytokine release syndrome, grade 2   • Chronic heart failure with preserved ejection fraction   • Pneumonia   • Low testosterone   • Disorder of prostate   • Benign prostatic hyperplasia without lower urinary tract symptoms       Dear LORENZO Tinsley:    Subjective    Edward Rodas is a 84 y.o. male with the problems as listed above, presents    Chief Complaint   Patient presents with   • Nonsustained ventricular tachycardia       History of Present Illness: Mr. Edward Rodas is a pleasant 84-year-old gentleman with history of nonsustained ventricular tachycardia and supravalve tachycardia noted on the Holter monitor in the past.  He has been on metoprolol succinate 75 mg daily and seems to be doing well.  On today's visit he denies any complaints of palpitations, dizziness or syncope.  He denies any chest pains or shortness of breath and overall feels pretty good.    Limited Transthoracic Echocardiogram    Accession Number: [id number]   Date of Study: 7/26/23   Ordering Provider:  Louann Pardo APRN   Clinical Indications: Dyspnea        [facility]  Performing Staff   Cardiology: Jae Payan MD    Tech: MelaniKirsty ashton          Show images for Adult Transthoracic Echo Limited W/ Cont if Necessary Per Protocol   Clinical Indication    Dyspnea   Dx: Bilateral leg edema [R60.0 (ICD-10-CM)]; Dyspnea on exertion [R06.09 (ICD-10-CM)]     Interpretation Summary   •  This is a limited study mainly to assess the LV wall motion and systolic function.  •  Normal left ventricular cavity size and wall thickness noted. All left ventricular wall segments contract normally.  •  Left ventricular ejection fraction appears to be 61 - 65%.  •  There is no evidence of pericardial effusion.       Allergies   Allergen Reactions   • Sulfa Antibiotics Myalgia     States lowers blood sugar   • Diprivan [Propofol] Angioedema   • Statins Mental Status Change and Myalgia   • Shellfish-Derived Products Hives and Itching   :    Current Outpatient Medications:   •  acetaminophen (TYLENOL) 500 MG tablet, Take 1 tablet by mouth Every 6 (Six) Hours As Needed for Mild Pain or Fever., Disp: , Rfl:   •  albuterol sulfate  (90 Base) MCG/ACT inhaler, Inhale 2 puffs Every 4 (Four) Hours As Needed for Wheezing., Disp: 8.5 g, Rfl: 0  •  amLODIPine (NORVASC) 5 MG tablet, Take 1 tablet by mouth Daily., Disp: , Rfl:   •  budesonide-formoterol (SYMBICORT) 160-4.5 MCG/ACT inhaler, Inhale 2 puffs 2 (Two) Times a Day., Disp: , Rfl:   •  levothyroxine (SYNTHROID, LEVOTHROID) 112 MCG tablet, Take 1 tablet by mouth Daily., Disp: , Rfl:   •  metoprolol succinate XL (TOPROL-XL) 50 MG 24 hr tablet, Take 2 tablets by mouth Daily., Disp: 180 tablet, Rfl: 2  •  valsartan-hydrochlorothiazide (DIOVAN-HCT) 160-12.5 MG per tablet, Take 1 tablet by mouth Daily., Disp: , Rfl:     The following portions of the patient's history were reviewed and updated as appropriate: allergies, current medications, past family history, past  "medical history, past social history, past surgical history and problem list.    Social History     Tobacco Use   • Smoking status: Former     Current packs/day: 0.00     Types: Cigarettes     Quit date:      Years since quittin.1   • Smokeless tobacco: Never   Vaping Use   • Vaping status: Never Used   Substance Use Topics   • Alcohol use: No   • Drug use: No     Review of Systems   Constitutional: Negative for chills and fever.   HENT:  Negative for nosebleeds and sore throat.    Respiratory:  Negative for cough, hemoptysis and wheezing.    Gastrointestinal:  Negative for abdominal pain, hematemesis, hematochezia, melena, nausea and vomiting.   Genitourinary:  Negative for dysuria and hematuria.   Neurological:  Negative for headaches.   All other systems reviewed and are negative.    Objective  Vitals:    25 1307   BP: 142/78   BP Location: Left arm   Patient Position: Sitting   Cuff Size: Adult   Pulse: 69   Resp: 18   SpO2: 95%   Weight: 104 kg (228 lb 3.2 oz)   Height: 175.3 cm (69\")     Body mass index is 33.7 kg/m².    Vitals reviewed.   Constitutional:       Appearance: Well-developed.   Eyes:      Conjunctiva/sclera: Conjunctivae normal.   HENT:      Head: Normocephalic.   Neck:      Thyroid: No thyromegaly.      Vascular: No JVD.      Trachea: No tracheal deviation.   Pulmonary:      Effort: No respiratory distress.      Breath sounds: Normal breath sounds. No wheezing. No rales.   Cardiovascular:      PMI at left midclavicular line. Normal rate. Regular rhythm. Normal S1. Normal S2.       Murmurs: There is no murmur.      No gallop.  No click. No rub.   Pulses:     Intact distal pulses.   Edema:     Peripheral edema absent.   Abdominal:      General: Bowel sounds are normal.      Palpations: Abdomen is soft. There is no abdominal mass.      Tenderness: There is no abdominal tenderness.   Musculoskeletal:      Cervical back: Normal range of motion and neck supple. Skin:     General: Skin is " warm and dry.   Neurological:      Mental Status: Alert and oriented to person, place, and time.      Cranial Nerves: No cranial nerve deficit.       Lab Results   Component Value Date     10/22/2024    K 3.8 10/22/2024     10/22/2024    CO2 28.5 10/22/2024    BUN 16 10/22/2024    CREATININE 1.24 10/22/2024    GLUCOSE 117 (H) 10/22/2024    CALCIUM 9.2 10/22/2024    AST 17 10/22/2024    ALT 17 10/22/2024    ALKPHOS 88 10/22/2024     Lab Results   Component Value Date    WBC 16.81 (H) 10/22/2024    HGB 15.3 10/22/2024    HCT 46.9 10/22/2024     10/22/2024     Lab Results   Component Value Date    INR 1.03 08/03/2020    INR 0.90 02/23/2020    INR 0.94 07/20/2019     Lab Results   Component Value Date    MG 2.5 (H) 07/05/2023     Lab Results   Component Value Date    TSH 4.200 07/05/2023    PSA 6.470 (H) 07/24/2023    TRIG 112 08/08/2018    HDL 34 (L) 08/08/2018     (H) 08/08/2018          ECG 12 Lead    Date/Time: 1/27/2025 1:15 PM  Performed by: Jae Payan MD    Authorized by: Jae Payan MD  Comparison: compared with previous ECG from 2/18/2024  Rhythm: sinus rhythm  Conduction: conduction normal  ST Segments: ST segments normal  T Waves: T waves normal    Clinical impression: normal ECG            Assessment & Plan   Diagnosis Plan   1. Nonsustained ventricular tachycardia, clinically asymptomatic and stable.        2. Nonsustained supraventricular tachycardia, clinically asymptomatic and stable.        3. Essential hypertension with elevated systolic BPs.         Recommendations  Will increase the metoprolol succinate dose to 100 mg daily as he says his blood pressure at home tends to run higher than 140s on the top frequently.  I have asked her to keep a check on her blood pressure at home twice a day and bring it with next visit    Return in about 3 months (around 4/27/2025).    As always,  Ángela  I appreciate very much the opportunity to participate in the cardiovascular  care of your patients. Please do not hesitate to call me with any questions with regards to Edward Rodas's evaluation and management.       With Best Regards,        Jae Payan MD, FACC    Please note that portions of this note were completed with a voice recognition program.

## 2025-05-19 ENCOUNTER — OFFICE VISIT (OUTPATIENT)
Dept: CARDIOLOGY | Facility: CLINIC | Age: 85
End: 2025-05-19
Payer: MEDICARE

## 2025-05-19 VITALS
RESPIRATION RATE: 18 BRPM | SYSTOLIC BLOOD PRESSURE: 139 MMHG | HEART RATE: 61 BPM | HEIGHT: 69 IN | WEIGHT: 226.8 LBS | BODY MASS INDEX: 33.59 KG/M2 | DIASTOLIC BLOOD PRESSURE: 70 MMHG | OXYGEN SATURATION: 97 %

## 2025-05-19 DIAGNOSIS — I47.10 NONSUSTAINED SUPRAVENTRICULAR TACHYCARDIA: ICD-10-CM

## 2025-05-19 DIAGNOSIS — I10 ESSENTIAL HYPERTENSION: ICD-10-CM

## 2025-05-19 DIAGNOSIS — I47.29 NONSUSTAINED VENTRICULAR TACHYCARDIA: Primary | ICD-10-CM

## 2025-05-19 PROCEDURE — 99213 OFFICE O/P EST LOW 20 MIN: CPT | Performed by: INTERNAL MEDICINE

## 2025-05-19 PROCEDURE — 93000 ELECTROCARDIOGRAM COMPLETE: CPT | Performed by: INTERNAL MEDICINE

## 2025-05-19 RX ORDER — METOPROLOL SUCCINATE 50 MG/1
100 TABLET, EXTENDED RELEASE ORAL DAILY
Qty: 180 TABLET | Refills: 2 | Status: SHIPPED | OUTPATIENT
Start: 2025-05-19

## 2025-05-19 NOTE — PROGRESS NOTES
Ángela Bonilla, APRN  Edward Rodas  1940 05/19/2025    Patient Active Problem List   Diagnosis    Essential hypertension    Palpitations    Near syncope    Nonsustained supraventricular tachycardia    Nonsustained ventricular tachycardia    Abnormal nuclear stress test    Pneumonia due to COVID-19 virus    Acute hypoxemic respiratory failure due to COVID-19    Cytokine release syndrome, grade 2    Chronic heart failure with preserved ejection fraction    Pneumonia    Low testosterone    Disorder of prostate    Benign prostatic hyperplasia without lower urinary tract symptoms       Dear Ángela Bonilla, APRN:    Subjective     Edward Rodas is a 85 y.o. male with the problems as listed above, presents    Chief Complaint   Patient presents with    Follow-up     Nonsustained ventricular tachycardia - Patient states he has been doing well. Patient does report he has some swelling in his legs but no more than usual. Patient denies any chest pains, palpitation, or shortness of breath.        History of Present Illness: Mr. Edward Rodas is a pleasant 85-year-old  male with history of nonsustained ventricular tachycardia noted on previous Holter monitor.  He has been on metoprolol succinate.  He is here today as regular follow-up.  On today's visit he denies any significant palpitations, dizziness or syncope.  Overall he feels all right.    Allergies   Allergen Reactions    Sulfa Antibiotics Myalgia     States lowers blood sugar    Diprivan [Propofol] Angioedema    Statins Mental Status Change and Myalgia    Shellfish-Derived Products Hives and Itching   :    Current Outpatient Medications:     acetaminophen (TYLENOL) 500 MG tablet, Take 1 tablet by mouth Every 6 (Six) Hours As Needed for Mild Pain or Fever., Disp: , Rfl:     albuterol sulfate  (90 Base) MCG/ACT inhaler, Inhale 2 puffs Every 4 (Four) Hours As Needed for Wheezing., Disp: 8.5 g, Rfl: 0    amLODIPine (NORVASC) 5 MG tablet, Take 1 tablet  "by mouth Daily., Disp: , Rfl:     budesonide-formoterol (SYMBICORT) 160-4.5 MCG/ACT inhaler, Inhale 2 puffs 2 (Two) Times a Day., Disp: , Rfl:     levothyroxine (SYNTHROID, LEVOTHROID) 112 MCG tablet, Take 1 tablet by mouth Daily., Disp: , Rfl:     metoprolol succinate XL (TOPROL-XL) 50 MG 24 hr tablet, Take 2 tablets by mouth Daily., Disp: 180 tablet, Rfl: 2    valsartan-hydrochlorothiazide (DIOVAN-HCT) 160-12.5 MG per tablet, Take 1 tablet by mouth Daily., Disp: , Rfl:     The following portions of the patient's history were reviewed and updated as appropriate: allergies, current medications, past family history, past medical history, past social history, past surgical history and problem list.    Social History     Tobacco Use    Smoking status: Former     Current packs/day: 0.00     Types: Cigarettes     Quit date:      Years since quittin.4    Smokeless tobacco: Never   Vaping Use    Vaping status: Never Used   Substance Use Topics    Alcohol use: No    Drug use: No     Review of Systems   Constitutional: Negative.   HENT: Negative.     Eyes: Negative.    Cardiovascular:  Positive for leg swelling. Negative for chest pain and palpitations.   Respiratory: Negative.     Endocrine: Negative.    Hematologic/Lymphatic: Negative.    Skin: Negative.    Musculoskeletal: Negative.    Gastrointestinal: Negative.    Genitourinary: Negative.    Neurological:  Positive for dizziness, loss of balance and vertigo. Negative for headaches, light-headedness, numbness and tremors.   Psychiatric/Behavioral: Negative.     Allergic/Immunologic: Negative.      Objective   Vitals:    25 1258   BP: 139/70   BP Location: Left arm   Patient Position: Sitting   Cuff Size: Adult   Pulse: 61   Resp: 18   SpO2: 97%   Weight: 103 kg (226 lb 12.8 oz)   Height: 175.3 cm (69\")     Body mass index is 33.49 kg/m².    Vitals reviewed.   Constitutional:       Appearance: Well-developed.   Eyes:      Conjunctiva/sclera: Conjunctivae " "normal.   HENT:      Head: Normocephalic.   Neck:      Thyroid: No thyromegaly.      Vascular: No JVD.      Trachea: No tracheal deviation.   Pulmonary:      Effort: No respiratory distress.      Breath sounds: Normal breath sounds. No wheezing. No rales.   Cardiovascular:      PMI at left midclavicular line. Normal rate. Regular rhythm. Normal S1. Normal S2.       Murmurs: There is no murmur.      No gallop.  No click. No rub.   Pulses:     Intact distal pulses.   Edema:     Peripheral edema absent.   Abdominal:      General: Bowel sounds are normal.      Palpations: Abdomen is soft. There is no abdominal mass.      Tenderness: There is no abdominal tenderness.   Musculoskeletal:      Cervical back: Normal range of motion and neck supple. Skin:     General: Skin is warm and dry.   Neurological:      Mental Status: Alert and oriented to person, place, and time.      Cranial Nerves: No cranial nerve deficit.     Lab Results   Component Value Date     10/22/2024    K 3.8 10/22/2024     10/22/2024    CO2 28.5 10/22/2024    BUN 16 10/22/2024    CREATININE 1.24 10/22/2024    GLUCOSE 117 (H) 10/22/2024    CALCIUM 9.2 10/22/2024    AST 17 10/22/2024    ALT 17 10/22/2024    ALKPHOS 88 10/22/2024     No results found for: \"CKTOTAL\"  Lab Results   Component Value Date    WBC 16.81 (H) 10/22/2024    HGB 15.3 10/22/2024    HCT 46.9 10/22/2024     10/22/2024     Lab Results   Component Value Date    INR 1.03 08/03/2020    INR 0.90 02/23/2020    INR 0.94 07/20/2019     Lab Results   Component Value Date    MG 2.5 (H) 07/05/2023     Lab Results   Component Value Date    TSH 4.200 07/05/2023    PSA 6.470 (H) 07/24/2023    TRIG 112 08/08/2018    HDL 34 (L) 08/08/2018     (H) 08/08/2018       ECG 12 Lead    Date/Time: 5/19/2025 1:06 PM  Performed by: Jae Payan MD    Authorized by: Jae Payan MD  Comparison: compared with previous ECG from 1/27/2025  Similar to previous ECG  Rhythm: sinus " bradycardia  BPM: 57  ST Segments: ST segments normal  T Waves: T waves normal        Assessment & Plan    Diagnosis Plan   1. Nonsustained ventricular tachycardia, clinically asymptomatic and stable.  metoprolol succinate XL (TOPROL-XL) 50 MG 24 hr tablet      2. Nonsustained supraventricular tachycardia, clinically asymptomatic and stable.        3. Essential hypertension, controlled.  metoprolol succinate XL (TOPROL-XL) 50 MG 24 hr tablet        Recommendations  Continue metoprolol succinate at current dose.    Return in about 5 months (around 10/19/2025) for or sooner if needed.    As always, Ángela Bonilla, APRN  I appreciate very much the opportunity to participate in the cardiovascular care of your patients. Please do not hesitate to call me with any questions with regards to Edward Rodas's evaluation and management.       With Best Regards,        Jae Payan MD, [facility]    Please note that portions of this note were completed with a voice recognition program.

## 2025-05-19 NOTE — LETTER
May 20, 2025     LORENZO Wade  [address] [facility] [place] [address]    Patient: Edward Rodas   YOB: 1940   Date of Visit: 5/19/2025     Dear LORENZO Wade:       Thank you for referring Edward Rodas to me for evaluation. Below are the relevant portions of my assessment and plan of care.    If you have questions, please do not hesitate to call me. I look forward to following Edward along with you.         Sincerely,        Jae Payan MD        CC: No Recipients    Jae Payan MD  05/20/25 0924  Incomplete  Ángela Bonilla, LORENZO  Edward Rodas  1940 05/19/2025    Patient Active Problem List   Diagnosis   • Essential hypertension   • Palpitations   • Near syncope   • Nonsustained supraventricular tachycardia   • Nonsustained ventricular tachycardia   • Abnormal nuclear stress test   • Pneumonia due to COVID-19 virus   • Acute hypoxemic respiratory failure due to COVID-19   • Cytokine release syndrome, grade 2   • Chronic heart failure with preserved ejection fraction   • Pneumonia   • Low testosterone   • Disorder of prostate   • Benign prostatic hyperplasia without lower urinary tract symptoms       Dear Ángela Bonilla, APRN:    Subjective     Edward Rodas is a 85 y.o. male with the problems as listed above, presents    Chief Complaint   Patient presents with   • Follow-up     Nonsustained ventricular tachycardia - Patient states he has been doing well. Patient does report he has some swelling in his legs but no more than usual. Patient denies any chest pains, palpitation, or shortness of breath.        History of Present Illness: Mr. Edward Rodas is a pleasant 85-year-old  male with history of nonsustained ventricular tachycardia noted on previous Holter monitor.  He has been on metoprolol succinate.  He is here today as regular follow-up.  On today's visit he denies any significant palpitations, dizziness or syncope.  Overall he feels all right.    Allergies    Allergen Reactions   • Sulfa Antibiotics Myalgia     States lowers blood sugar   • Diprivan [Propofol] Angioedema   • Statins Mental Status Change and Myalgia   • Shellfish-Derived Products Hives and Itching   :    Current Outpatient Medications:   •  acetaminophen (TYLENOL) 500 MG tablet, Take 1 tablet by mouth Every 6 (Six) Hours As Needed for Mild Pain or Fever., Disp: , Rfl:   •  albuterol sulfate  (90 Base) MCG/ACT inhaler, Inhale 2 puffs Every 4 (Four) Hours As Needed for Wheezing., Disp: 8.5 g, Rfl: 0  •  amLODIPine (NORVASC) 5 MG tablet, Take 1 tablet by mouth Daily., Disp: , Rfl:   •  budesonide-formoterol (SYMBICORT) 160-4.5 MCG/ACT inhaler, Inhale 2 puffs 2 (Two) Times a Day., Disp: , Rfl:   •  levothyroxine (SYNTHROID, LEVOTHROID) 112 MCG tablet, Take 1 tablet by mouth Daily., Disp: , Rfl:   •  metoprolol succinate XL (TOPROL-XL) 50 MG 24 hr tablet, Take 2 tablets by mouth Daily., Disp: 180 tablet, Rfl: 2  •  valsartan-hydrochlorothiazide (DIOVAN-HCT) 160-12.5 MG per tablet, Take 1 tablet by mouth Daily., Disp: , Rfl:     The following portions of the patient's history were reviewed and updated as appropriate: allergies, current medications, past family history, past medical history, past social history, past surgical history and problem list.    Social History     Tobacco Use   • Smoking status: Former     Current packs/day: 0.00     Types: Cigarettes     Quit date:      Years since quittin.4   • Smokeless tobacco: Never   Vaping Use   • Vaping status: Never Used   Substance Use Topics   • Alcohol use: No   • Drug use: No     Review of Systems   Constitutional: Negative.   HENT: Negative.     Eyes: Negative.    Cardiovascular:  Positive for leg swelling. Negative for chest pain and palpitations.   Respiratory: Negative.     Endocrine: Negative.    Hematologic/Lymphatic: Negative.    Skin: Negative.    Musculoskeletal: Negative.    Gastrointestinal: Negative.    Genitourinary: Negative.  "   Neurological:  Positive for dizziness, loss of balance and vertigo. Negative for headaches, light-headedness, numbness and tremors.   Psychiatric/Behavioral: Negative.     Allergic/Immunologic: Negative.      Objective   Vitals:    05/19/25 1258   BP: 139/70   BP Location: Left arm   Patient Position: Sitting   Cuff Size: Adult   Pulse: 61   Resp: 18   SpO2: 97%   Weight: 103 kg (226 lb 12.8 oz)   Height: 175.3 cm (69\")     Body mass index is 33.49 kg/m².    Vitals reviewed.   Constitutional:       Appearance: Well-developed.   Eyes:      Conjunctiva/sclera: Conjunctivae normal.   HENT:      Head: Normocephalic.   Neck:      Thyroid: No thyromegaly.      Vascular: No JVD.      Trachea: No tracheal deviation.   Pulmonary:      Effort: No respiratory distress.      Breath sounds: Normal breath sounds. No wheezing. No rales.   Cardiovascular:      PMI at left midclavicular line. Normal rate. Regular rhythm. Normal S1. Normal S2.       Murmurs: There is no murmur.      No gallop.  No click. No rub.   Pulses:     Intact distal pulses.   Edema:     Peripheral edema absent.   Abdominal:      General: Bowel sounds are normal.      Palpations: Abdomen is soft. There is no abdominal mass.      Tenderness: There is no abdominal tenderness.   Musculoskeletal:      Cervical back: Normal range of motion and neck supple. Skin:     General: Skin is warm and dry.   Neurological:      Mental Status: Alert and oriented to person, place, and time.      Cranial Nerves: No cranial nerve deficit.     Lab Results   Component Value Date     10/22/2024    K 3.8 10/22/2024     10/22/2024    CO2 28.5 10/22/2024    BUN 16 10/22/2024    CREATININE 1.24 10/22/2024    GLUCOSE 117 (H) 10/22/2024    CALCIUM 9.2 10/22/2024    AST 17 10/22/2024    ALT 17 10/22/2024    ALKPHOS 88 10/22/2024     No results found for: \"CKTOTAL\"  Lab Results   Component Value Date    WBC 16.81 (H) 10/22/2024    HGB 15.3 10/22/2024    HCT 46.9 10/22/2024    "  10/22/2024     Lab Results   Component Value Date    INR 1.03 08/03/2020    INR 0.90 02/23/2020    INR 0.94 07/20/2019     Lab Results   Component Value Date    MG 2.5 (H) 07/05/2023     Lab Results   Component Value Date    TSH 4.200 07/05/2023    PSA 6.470 (H) 07/24/2023    TRIG 112 08/08/2018    HDL 34 (L) 08/08/2018     (H) 08/08/2018       ECG 12 Lead    Date/Time: 5/19/2025 1:06 PM  Performed by: Jae Payan MD    Authorized by: Jae Payan MD  Comparison: compared with previous ECG from 1/27/2025  Similar to previous ECG  Rhythm: sinus bradycardia  BPM: 57  ST Segments: ST segments normal  T Waves: T waves normal        Assessment & Plan    Diagnosis Plan   1. Nonsustained ventricular tachycardia, clinically asymptomatic and stable.  metoprolol succinate XL (TOPROL-XL) 50 MG 24 hr tablet      2. Nonsustained supraventricular tachycardia, clinically asymptomatic and stable.        3. Essential hypertension, controlled.  metoprolol succinate XL (TOPROL-XL) 50 MG 24 hr tablet        Recommendations  Continue metoprolol succinate at current dose.    Return in about 5 months (around 10/19/2025) for or sooner if needed.    As always, Ángela Bonilla, APRN  I appreciate very much the opportunity to participate in the cardiovascular care of your patients. Please do not hesitate to call me with any questions with regards to Edward Rodas's evaluation and management.       With Best Regards,        Jae Payan MD, [facility]    Please note that portions of this note were completed with a voice recognition program.            Jae Payan MD  05/19/25 1344  Sign when Signing Visit  Ángela Bonilla, APRN  Edward Rodas  1940 05/19/2025    Patient Active Problem List   Diagnosis   • Essential hypertension   • Palpitations   • Near syncope   • Nonsustained supraventricular tachycardia   • Nonsustained ventricular tachycardia   • Abnormal nuclear stress test   • Pneumonia due to COVID-19  virus   • Acute hypoxemic respiratory failure due to COVID-19   • Cytokine release syndrome, grade 2   • Chronic heart failure with preserved ejection fraction   • Pneumonia   • Low testosterone   • Disorder of prostate   • Benign prostatic hyperplasia without lower urinary tract symptoms       Ángela Santana, APRN:    Eva Rodas is a 85 y.o. male with the problems as listed above, presents    Chief Complaint   Patient presents with   • Follow-up     Nonsustained ventricular tachycardia - Patient states he has been doing well. Patient does report he has some swelling in his legs but no more than usual. Patient denies any chest pains, palpitation, or shortness of breath.        History of Present Illness: Mr. Edward Cantor is a pleasant 85-year-old  male with history of nonsustained ventricular tachycardia noted on previous Holter monitor.  He has been on metoprolol succinate.  He is here today as regular follow-up.  On today's visit he denies any significant palpitations, dizziness or syncope.  Overall he feels all right    Allergies   Allergen Reactions   • Sulfa Antibiotics Myalgia     States lowers blood sugar   • Diprivan [Propofol] Angioedema   • Statins Mental Status Change and Myalgia   • Shellfish-Derived Products Hives and Itching   :    Current Outpatient Medications:   •  acetaminophen (TYLENOL) 500 MG tablet, Take 1 tablet by mouth Every 6 (Six) Hours As Needed for Mild Pain or Fever., Disp: , Rfl:   •  albuterol sulfate  (90 Base) MCG/ACT inhaler, Inhale 2 puffs Every 4 (Four) Hours As Needed for Wheezing., Disp: 8.5 g, Rfl: 0  •  amLODIPine (NORVASC) 5 MG tablet, Take 1 tablet by mouth Daily., Disp: , Rfl:   •  budesonide-formoterol (SYMBICORT) 160-4.5 MCG/ACT inhaler, Inhale 2 puffs 2 (Two) Times a Day., Disp: , Rfl:   •  levothyroxine (SYNTHROID, LEVOTHROID) 112 MCG tablet, Take 1 tablet by mouth Daily., Disp: , Rfl:   •  metoprolol succinate XL (TOPROL-XL) 50 MG  "24 hr tablet, Take 2 tablets by mouth Daily., Disp: 180 tablet, Rfl: 2  •  valsartan-hydrochlorothiazide (DIOVAN-HCT) 160-12.5 MG per tablet, Take 1 tablet by mouth Daily., Disp: , Rfl:     The following portions of the patient's history were reviewed and updated as appropriate: allergies, current medications, past family history, past medical history, past social history, past surgical history and problem list.    Social History     Tobacco Use   • Smoking status: Former     Current packs/day: 0.00     Types: Cigarettes     Quit date:      Years since quittin.4   • Smokeless tobacco: Never   Vaping Use   • Vaping status: Never Used   Substance Use Topics   • Alcohol use: No   • Drug use: No     Review of Systems   Constitutional: Negative.   HENT: Negative.     Eyes: Negative.    Cardiovascular:  Positive for leg swelling. Negative for chest pain and palpitations.   Respiratory: Negative.     Endocrine: Negative.    Hematologic/Lymphatic: Negative.    Skin: Negative.    Musculoskeletal: Negative.    Gastrointestinal: Negative.    Genitourinary: Negative.    Neurological:  Positive for dizziness, loss of balance and vertigo. Negative for headaches, light-headedness, numbness and tremors.   Psychiatric/Behavioral: Negative.     Allergic/Immunologic: Negative.      Objective  Vitals:    25 1258   BP: 139/70   BP Location: Left arm   Patient Position: Sitting   Cuff Size: Adult   Pulse: 61   Resp: 18   SpO2: 97%   Weight: 103 kg (226 lb 12.8 oz)   Height: 175.3 cm (69\")     Body mass index is 33.49 kg/m².    Vitals reviewed.   Constitutional:       Appearance: Well-developed.   Eyes:      Conjunctiva/sclera: Conjunctivae normal.   HENT:      Head: Normocephalic.   Neck:      Thyroid: No thyromegaly.      Vascular: No JVD.      Trachea: No tracheal deviation.   Pulmonary:      Effort: No respiratory distress.      Breath sounds: Normal breath sounds. No wheezing. No rales.   Cardiovascular:      PMI at " "left midclavicular line. Normal rate. Regular rhythm. Normal S1. Normal S2.       Murmurs: There is no murmur.      No gallop.  No click. No rub.   Pulses:     Intact distal pulses.   Edema:     Peripheral edema absent.   Abdominal:      General: Bowel sounds are normal.      Palpations: Abdomen is soft. There is no abdominal mass.      Tenderness: There is no abdominal tenderness.   Musculoskeletal:      Cervical back: Normal range of motion and neck supple. Skin:     General: Skin is warm and dry.   Neurological:      Mental Status: Alert and oriented to person, place, and time.      Cranial Nerves: No cranial nerve deficit.     Lab Results   Component Value Date     10/22/2024    K 3.8 10/22/2024     10/22/2024    CO2 28.5 10/22/2024    BUN 16 10/22/2024    CREATININE 1.24 10/22/2024    GLUCOSE 117 (H) 10/22/2024    CALCIUM 9.2 10/22/2024    AST 17 10/22/2024    ALT 17 10/22/2024    ALKPHOS 88 10/22/2024     No results found for: \"CKTOTAL\"  Lab Results   Component Value Date    WBC 16.81 (H) 10/22/2024    HGB 15.3 10/22/2024    HCT 46.9 10/22/2024     10/22/2024     Lab Results   Component Value Date    INR 1.03 08/03/2020    INR 0.90 02/23/2020    INR 0.94 07/20/2019     Lab Results   Component Value Date    MG 2.5 (H) 07/05/2023     Lab Results   Component Value Date    TSH 4.200 07/05/2023    PSA 6.470 (H) 07/24/2023    TRIG 112 08/08/2018    HDL 34 (L) 08/08/2018     (H) 08/08/2018       ECG 12 Lead    Date/Time: 5/19/2025 1:06 PM  Performed by: Jae Payan MD    Authorized by: Jae Payan MD  Comparison: compared with previous ECG from 1/27/2025  Similar to previous ECG  Rhythm: sinus bradycardia  BPM: 57  ST Segments: ST segments normal  T Waves: T waves normal        Assessment & Plan   Diagnosis Plan   1. Nonsustained ventricular tachycardia, clinically asymptomatic and stable.  metoprolol succinate XL (TOPROL-XL) 50 MG 24 hr tablet      2. Nonsustained supraventricular " tachycardia, clinically asymptomatic and stable.        3. Essential hypertension, controlled.  metoprolol succinate XL (TOPROL-XL) 50 MG 24 hr tablet        Recommendations  Continue metoprolol succinate at current dose  Return in about 5 months (around 10/19/2025) for or sooner if needed.    As always, Ángela Bonilla, LORENZO  I appreciate very much the opportunity to participate in the cardiovascular care of your patients. Please do not hesitate to call me with any questions with regards to Edward Rodas's evaluation and management.       With Best Regards,        Jae Payan MD, [facility]    Please note that portions of this note were completed with a voice recognition program.

## 2025-07-22 ENCOUNTER — LAB (OUTPATIENT)
Dept: ONCOLOGY | Facility: CLINIC | Age: 85
End: 2025-07-22
Payer: MEDICARE

## 2025-07-22 ENCOUNTER — OFFICE VISIT (OUTPATIENT)
Dept: ONCOLOGY | Facility: CLINIC | Age: 85
End: 2025-07-22
Payer: MEDICARE

## 2025-07-22 VITALS
SYSTOLIC BLOOD PRESSURE: 135 MMHG | HEART RATE: 73 BPM | TEMPERATURE: 97.7 F | OXYGEN SATURATION: 95 % | DIASTOLIC BLOOD PRESSURE: 70 MMHG | RESPIRATION RATE: 16 BRPM | WEIGHT: 223.6 LBS | BODY MASS INDEX: 33.12 KG/M2 | HEIGHT: 69 IN

## 2025-07-22 DIAGNOSIS — C91.10 CLL (CHRONIC LYMPHOCYTIC LEUKEMIA): ICD-10-CM

## 2025-07-22 DIAGNOSIS — R42 DIZZINESS: ICD-10-CM

## 2025-07-22 DIAGNOSIS — C91.10 CLL (CHRONIC LYMPHOCYTIC LEUKEMIA): Primary | ICD-10-CM

## 2025-07-22 LAB
ALBUMIN SERPL-MCNC: 3.8 G/DL (ref 3.5–5.2)
ALBUMIN/GLOB SERPL: 1.4 G/DL
ALP SERPL-CCNC: 90 U/L (ref 39–117)
ALT SERPL W P-5'-P-CCNC: 15 U/L (ref 1–41)
ANION GAP SERPL CALCULATED.3IONS-SCNC: 11.3 MMOL/L (ref 5–15)
AST SERPL-CCNC: 17 U/L (ref 1–40)
BILIRUB SERPL-MCNC: 0.4 MG/DL (ref 0–1.2)
BUN SERPL-MCNC: 13.9 MG/DL (ref 8–23)
BUN/CREAT SERPL: 13.2 (ref 7–25)
CALCIUM SPEC-SCNC: 8.4 MG/DL (ref 8.6–10.5)
CHLORIDE SERPL-SCNC: 104 MMOL/L (ref 98–107)
CO2 SERPL-SCNC: 24.7 MMOL/L (ref 22–29)
CREAT SERPL-MCNC: 1.05 MG/DL (ref 0.76–1.27)
DEPRECATED RDW RBC AUTO: 50 FL (ref 37–54)
EGFRCR SERPLBLD CKD-EPI 2021: 68.7 ML/MIN/1.73
EOSINOPHIL # BLD MANUAL: 0.59 10*3/MM3 (ref 0–0.4)
EOSINOPHIL NFR BLD MANUAL: 3 % (ref 0.3–6.2)
ERYTHROCYTE [DISTWIDTH] IN BLOOD BY AUTOMATED COUNT: 14.6 % (ref 12.3–15.4)
GLOBULIN UR ELPH-MCNC: 2.8 GM/DL
GLUCOSE SERPL-MCNC: 144 MG/DL (ref 65–99)
HCT VFR BLD AUTO: 45.3 % (ref 37.5–51)
HGB BLD-MCNC: 15 G/DL (ref 13–17.7)
LDH SERPL-CCNC: 197 U/L (ref 135–225)
LYMPHOCYTES # BLD MANUAL: 9.41 10*3/MM3 (ref 0.7–3.1)
LYMPHOCYTES NFR BLD MANUAL: 11 % (ref 5–12)
MCH RBC QN AUTO: 30.5 PG (ref 26.6–33)
MCHC RBC AUTO-ENTMCNC: 33.1 G/DL (ref 31.5–35.7)
MCV RBC AUTO: 92.3 FL (ref 79–97)
MONOCYTES # BLD: 2.16 10*3/MM3 (ref 0.1–0.9)
NEUTROPHILS # BLD AUTO: 7.45 10*3/MM3 (ref 1.7–7)
NEUTROPHILS NFR BLD MANUAL: 38 % (ref 42.7–76)
PLAT MORPH BLD: NORMAL
PLATELET # BLD AUTO: 192 10*3/MM3 (ref 140–450)
PMV BLD AUTO: 10 FL (ref 6–12)
POTASSIUM SERPL-SCNC: 3.7 MMOL/L (ref 3.5–5.2)
PROT SERPL-MCNC: 6.6 G/DL (ref 6–8.5)
RBC # BLD AUTO: 4.91 10*6/MM3 (ref 4.14–5.8)
RBC MORPH BLD: NORMAL
SCAN SLIDE: NORMAL
SODIUM SERPL-SCNC: 140 MMOL/L (ref 136–145)
URATE SERPL-MCNC: 6.1 MG/DL (ref 3.4–7)
VARIANT LYMPHS NFR BLD MANUAL: 48 % (ref 19.6–45.3)
WBC NRBC COR # BLD AUTO: 19.6 10*3/MM3 (ref 3.4–10.8)

## 2025-07-22 PROCEDURE — 80053 COMPREHEN METABOLIC PANEL: CPT | Performed by: NURSE PRACTITIONER

## 2025-07-22 PROCEDURE — 1160F RVW MEDS BY RX/DR IN RCRD: CPT | Performed by: NURSE PRACTITIONER

## 2025-07-22 PROCEDURE — 84550 ASSAY OF BLOOD/URIC ACID: CPT | Performed by: NURSE PRACTITIONER

## 2025-07-22 PROCEDURE — 83615 LACTATE (LD) (LDH) ENZYME: CPT | Performed by: NURSE PRACTITIONER

## 2025-07-22 PROCEDURE — 85007 BL SMEAR W/DIFF WBC COUNT: CPT | Performed by: NURSE PRACTITIONER

## 2025-07-22 PROCEDURE — 85025 COMPLETE CBC W/AUTO DIFF WBC: CPT | Performed by: NURSE PRACTITIONER

## 2025-07-22 PROCEDURE — 1126F AMNT PAIN NOTED NONE PRSNT: CPT | Performed by: NURSE PRACTITIONER

## 2025-07-22 PROCEDURE — 99214 OFFICE O/P EST MOD 30 MIN: CPT | Performed by: NURSE PRACTITIONER

## 2025-07-22 PROCEDURE — 1159F MED LIST DOCD IN RCRD: CPT | Performed by: NURSE PRACTITIONER

## 2025-07-22 NOTE — PROGRESS NOTES
Venipuncture Blood Specimen Collection  Venipuncture performed in Left Arm by Leonardo Garces MA with good hemostasis. Patient tolerated the procedure well without complications.   07/22/25   Leonardo Garces MA

## 2025-07-22 NOTE — PROGRESS NOTES
"DATE:  7/22/2025    DIAGNOSIS: CLL/SLL    CHIEF COMPLAINT:  None        HISTORY OF PRESENT ILLNESS:   Edward Rodas is a very pleasant 85 y.o. male who is being seen today at the request of LORENZO Moses for evaluation and treatment of leukocytosis. Mr. Rodas reports following with his PCP every 3 months with lab testing. He reports that he has been aware of this issue for many years. Previous available CBCs were reviewed and patient has had chronic elevation in WBC since at least July 2019 with fluctuation in counts ranging 11.36-30,000 with normal H/H and platelets. Of note, patient reports that his WBC count of 30,000 was during the time he was hospitalized with COVID pneumonia. He reports that he feels like a \"83 year old male\". He reports that he tries to remain as active as possible and helps take care of his wife on a daily basis. He denies any worsening fatigue. He reports a good appetite, stable weight. Denies any fever/chills or drenching night sweats. No tender/enlarged lymph nodes. No recurrent or difficult to treat infections. No recent infections. His main complaint is dizziness. He is following with eye doctor and neurology for this. He denies any other specific complaints today.      Interval History:  Mr. Rodas presents today for follow up of leukocytosis. Overall, he reports that he has been doing well since his last visit.  He reports a good appetite, stable weight. Denies fever/chills or drenching night sweats. No tender/enlarged lymph nodes. He denies any specific complaints today.     The following portions of the patient's history were reviewed and updated as appropriate: allergies, current medications, past family history, past medical history, past social history, past surgical history and problem list.    PAST MEDICAL HISTORY:  Past Medical History:   Diagnosis Date    Allergic rhinitis     Asthma     Cancer     basal cell skin cancer    Chronic diastolic CHF (congestive heart " failure)     Disease of thyroid gland     Dysrhythmia     GERD (gastroesophageal reflux disease)     Heart murmur     as infant    History of ventricular tachycardia     Hyperlipidemia     Hypertension     Near syncope        PAST SURGICAL HISTORY:  Past Surgical History:   Procedure Laterality Date    SKIN SURGERY  2025    Skin cancer removeal, L side of face    TONSILLECTOMY         SOCIAL HISTORY:  Social History     Socioeconomic History    Marital status:    Tobacco Use    Smoking status: Former     Current packs/day: 0.00     Types: Cigarettes     Quit date:      Years since quittin.6    Smokeless tobacco: Never   Vaping Use    Vaping status: Never Used   Substance and Sexual Activity    Alcohol use: No    Drug use: No    Sexual activity: Defer                FAMILY HISTORY:  Family History   Problem Relation Age of Onset    Heart disease Brother          MEDICATIONS:  The current medication list was reviewed in the EMR    Current Outpatient Medications:     acetaminophen (TYLENOL) 500 MG tablet, Take 1 tablet by mouth Every 6 (Six) Hours As Needed for Mild Pain or Fever., Disp: , Rfl:     albuterol sulfate  (90 Base) MCG/ACT inhaler, Inhale 2 puffs Every 4 (Four) Hours As Needed for Wheezing., Disp: 8.5 g, Rfl: 0    amLODIPine (NORVASC) 5 MG tablet, Take 1 tablet by mouth Daily., Disp: , Rfl:     budesonide-formoterol (SYMBICORT) 160-4.5 MCG/ACT inhaler, Inhale 2 puffs 2 (Two) Times a Day., Disp: , Rfl:     levothyroxine (SYNTHROID, LEVOTHROID) 112 MCG tablet, Take 1 tablet by mouth Daily., Disp: , Rfl:     metoprolol succinate XL (TOPROL-XL) 50 MG 24 hr tablet, Take 2 tablets by mouth Daily., Disp: 180 tablet, Rfl: 2    valsartan-hydrochlorothiazide (DIOVAN-HCT) 160-12.5 MG per tablet, Take 1 tablet by mouth Daily., Disp: , Rfl:     ALLERGIES:    Allergies   Allergen Reactions    Sulfa Antibiotics Myalgia     States lowers blood sugar    Diprivan [Propofol] Angioedema    Statins  Mental Status Change and Myalgia    Shellfish-Derived Products Hives and Itching         REVIEW OF SYSTEMS:    A comprehensive 14 point review of systems was performed.  Significant findings as mentioned above.  All other systems reviewed and are negative.        Physical Exam   Vital Signs:   Vitals:    07/22/25 0940   BP: 135/70   Pulse: 73   Resp: 16   Temp: 97.7 °F (36.5 °C)   SpO2: 95%          ECOG score: 0   General: Well developed, well nourished, alert and oriented x 3, in no acute distress.   Head: ATNC   Eyes: PERRL, No evidence of conjunctivitis.   Nose: No nasal discharge.   Mouth: Oral mucosal membranes moist. No oral ulceration or hemorrhages.   Neck: Neck supple. No thyromegaly. No JVD.   Lungs: Clear in all fields to A&P without rales, rhonchi or wheezing.   Heart: S1, S2. Regular rate and rhythm. No murmurs, rubs, or gallops.   Abdomen: Soft. Bowel sounds are normoactive. Nontender with palpation. No Hepatosplenomegaly can be appreciated.   Extremities: No clubbing, cyanosis or edema bilaterally.   Integumentary: No rash, sores, erythema or nodules. No blistering, bruising, or dry skin.   Lymph Nodes: No palpable cervical, submandibular, supraclavicular, axillary lymphadenopathy noted. No petechiae, purpura or ecchymosis noted.   Neurologic: Grossly non-focal exam.    Pain Score:  Pain Score    07/22/25 0940   PainSc: 0-No pain                 IMAGING:        RECENT LABS:  Lab Results   Component Value Date    WBC 16.81 (H) 10/22/2024    HGB 15.3 10/22/2024    HCT 46.9 10/22/2024    MCV 93.2 10/22/2024    RDW 14.4 10/22/2024     10/22/2024    NEUTRORELPCT 61.4 04/29/2024    LYMPHORELPCT 32.1 04/29/2024    MONORELPCT 4.6 (L) 04/29/2024    EOSRELPCT 1.3 04/29/2024    BASORELPCT 0.2 04/29/2024    NEUTROABS 7.23 (H) 10/22/2024    LYMPHSABS 5.23 (H) 04/29/2024       Lab Results   Component Value Date     10/22/2024    K 3.8 10/22/2024    CO2 28.5 10/22/2024     10/22/2024    BUN 16  10/22/2024    CREATININE 1.24 10/22/2024    EGFRIFNONA 73 09/20/2021    GLUCOSE 117 (H) 10/22/2024    CALCIUM 9.2 10/22/2024    ALKPHOS 88 10/22/2024    AST 17 10/22/2024    ALT 17 10/22/2024    BILITOT 0.4 10/22/2024    ALBUMIN 4.1 10/22/2024    PROTEINTOT 7.1 10/22/2024    MG 2.5 (H) 07/05/2023       Lab Results   Component Value Date     10/22/2024    URICACID 6.6 10/22/2024       Lab Results   Component Value Date    FERRITIN 830.00 (H) 08/30/2021      Work Up 08/29/2023      Lab Results   Component Value Date    SEDRATE 17 08/29/2023    SEDRATE 16 11/24/2022     Lab Results   Component Value Date    CRP 0.55 (H) 08/29/2023    CRP 4.77 (H) 11/26/2022    CRP 4.35 (H) 11/25/2022    CRP <0.30 11/24/2022    CRP 0.32 09/02/2021    CRP 0.47 09/01/2021    CRP 0.65 (H) 08/31/2021    CRP 0.74 (H) 08/30/2021    CRP 1.02 (H) 08/29/2021    CRP 1.67 (H) 08/28/2021    CRP 3.09 (H) 08/27/2021    CRP 4.97 (H) 08/26/2021    CRP 8.91 (H) 08/25/2021    CRP 9.82 (H) 08/24/2021     Hepatitis B Surface Ag  Non-Reactive Non-Reactive   Hep A IgM  Non-Reactive Non-Reactive   Hep B C IgM  Non-Reactive Non-Reactive   Hepatitis C Ab  Non-Reactive Non-Reactive     HIV-1/ HIV-2  Non-Reactive Non-Reactive     JOE Direct  Negative Negative     Rheumatoid Factor Quantitative  0.0 - 14.0 IU/mL <10.0         BCR/ABL: Negative        ASSESSMENT & PLAN:  Edward Rodas is a very pleasant 85 y.o. male with    1.  CLL/SLL  - Previous available CBCs were reviewed and patient has had chronic elevation in WBC since at least July 2019 with fluctuation in counts ranging 11.36-30,000 with normal H/H and platelets. Of note, patient reports that his WBC count of 30,000 was during the time he was hospitalized with COVID pneumonia.  - Patient does not smoke. Clinically, he is doing well. Other than chronic fatigue, he denies any significant B-symptoms.  - CBC from initial consultation showed ongoing leukocytosis with WBC (14.66), primarily neutrophils.  H/H and platelets are normal. PBS was obtained and showed neutrophilic leukocytosis without significant left shift, mild absolute lymphocytosis, no abnormal/immature white blood cells, normochromic, normocytic red blood cells without anemia, platelets present in adequate numbers with normal appearance. ESR was normal. CRP was mildly elevated and suggestive of chronic underlying inflammation. Acute Hepatitis panel and HIV panel non-reactive. JOE and RF and was normal. Also sent BCR/ABL which was negative. However, Flow Cytometry showed Clonal B-cell population with CLL/SLL like phenotype accounting for approximately 23% of events. BCR/ABL was negative.   - Repeat CBC from today showing ongoing leukocytosis (19.60) with normal Hg/Hct and platelets. CMP shows elevated BG, of note he is not fasting. Calcium is low. LDH and Uric Acid are normal.  - Clinically, he is doing well and is without any significant B-symptoms. Recommended baseline imaging including CT neck, chest, abdomen/pelvis with contrast and patient declines at this time. Therefore, will continue conservative follow up in 6 months with repeat labs including CBC, CMP, LDH and Uric Acid.    2. Dizziness  - He has been following with eye doctor and neurology. There has been no reason for dizziness determined by either group. He previously requested referral to ENT for further evaluation. He did not keep this appointment due to death of his wife. He does not want to reschedule at this time   - He is now following with chiropractor for ? Vertigo. He believes the adjustment he is receiving has been helpful. No longer following with chiropractor. Recommended to continue to follow with PCP.        ACO / ANGÉLICA/Other  Quality measures  -  Edward Rodas did not receive 2024 flu vaccine.  This was recommended.  -  Edward Rodas reports a pain score of 0.    -  Current outpatient and discharge medications have been reconciled for the patient.  Reviewed by: Dianne Song  LORENZO Slade              I spent 30 minutes caring for Edward on this date of service. This time includes time spent by me in the following activities: preparing for the visit, reviewing tests, obtaining and/or reviewing a separately obtained history, performing a medically appropriate examination and/or evaluation, counseling and educating the patient/family/caregiver, ordering medications, tests, or procedures, documenting information in the medical record, independently interpreting results and communicating that information with the patient/family/caregiver, and care coordination.                                             Electronically Signed by: LORENZO Spivey APRN July 22, 2025 09:46 EDT       CC:   No ref. provider found  Ángela Bonilla APRN